# Patient Record
Sex: FEMALE | Race: WHITE | Employment: UNEMPLOYED | URBAN - METROPOLITAN AREA
[De-identification: names, ages, dates, MRNs, and addresses within clinical notes are randomized per-mention and may not be internally consistent; named-entity substitution may affect disease eponyms.]

---

## 2020-08-22 ENCOUNTER — OP HISTORICAL/CONVERTED ENCOUNTER (OUTPATIENT)
Dept: OTHER | Age: 60
End: 2020-08-22

## 2020-11-04 ENCOUNTER — APPOINTMENT (OUTPATIENT)
Dept: GENERAL RADIOLOGY | Age: 60
DRG: 683 | End: 2020-11-04
Attending: INTERNAL MEDICINE
Payer: COMMERCIAL

## 2020-11-04 ENCOUNTER — HOSPITAL ENCOUNTER (INPATIENT)
Age: 60
LOS: 4 days | Discharge: HOME | DRG: 683 | End: 2020-11-08
Attending: EMERGENCY MEDICINE | Admitting: INTERNAL MEDICINE
Payer: COMMERCIAL

## 2020-11-04 DIAGNOSIS — N30.00 ACUTE CYSTITIS WITHOUT HEMATURIA: ICD-10-CM

## 2020-11-04 DIAGNOSIS — E87.20 METABOLIC ACIDOSIS: Primary | ICD-10-CM

## 2020-11-04 DIAGNOSIS — G60.9 IDIOPATHIC PERIPHERAL NEUROPATHY: ICD-10-CM

## 2020-11-04 DIAGNOSIS — E87.6 HYPOKALEMIA: ICD-10-CM

## 2020-11-04 PROBLEM — N39.0 UTI (URINARY TRACT INFECTION): Status: ACTIVE | Noted: 2020-11-04

## 2020-11-04 LAB
ALBUMIN SERPL-MCNC: 3.1 G/DL (ref 3.5–5)
ALBUMIN/GLOB SERPL: 1 {RATIO} (ref 1.1–2.2)
ALP SERPL-CCNC: 75 U/L (ref 45–117)
ALT SERPL-CCNC: 39 U/L (ref 12–78)
ANION GAP SERPL CALC-SCNC: 10 MMOL/L (ref 5–15)
APPEARANCE UR: ABNORMAL
AST SERPL W P-5'-P-CCNC: 65 U/L (ref 15–37)
BACTERIA URNS QL MICRO: ABNORMAL /HPF
BASE DEFICIT BLDV-SCNC: 15.6 MMOL/L (ref 0–2)
BDY SITE: ABNORMAL
BILIRUB DIRECT SERPL-MCNC: <0.1 MG/DL (ref 0–0.2)
BILIRUB SERPL-MCNC: 0.5 MG/DL (ref 0.2–1)
BILIRUB UR QL: 1
BUN SERPL-MCNC: 20 MG/DL (ref 6–20)
BUN/CREAT SERPL: 16 (ref 12–20)
CA-I BLD-MCNC: 10.7 MG/DL (ref 8.5–10.1)
CHLORIDE SERPL-SCNC: 120 MMOL/L (ref 97–108)
CHLORIDE UR-SCNC: 39 MMOL/L
CO2 SERPL-SCNC: 13 MMOL/L (ref 21–32)
COLOR UR: YELLOW
CREAT SERPL-MCNC: 1.27 MG/DL (ref 0.55–1.02)
CREAT UR-MCNC: 213 MG/DL
ERYTHROCYTE [DISTWIDTH] IN BLOOD BY AUTOMATED COUNT: 17.5 % (ref 11.5–14.5)
FIO2 ON VENT: 21 %
GLOBULIN SER CALC-MCNC: 3 G/DL (ref 2–4)
GLUCOSE SERPL-MCNC: 109 MG/DL (ref 65–100)
GLUCOSE UR STRIP.AUTO-MCNC: NEGATIVE MG/DL
HCO3 BLDV-SCNC: 13 MMOL/L (ref 22–26)
HCT VFR BLD AUTO: 44 % (ref 35–47)
HGB BLD-MCNC: 16 G/DL (ref 11.5–16)
HGB UR QL STRIP: 250
HYALINE CASTS URNS QL MICRO: >20 /LPF (ref 0–5)
KETONES UR QL STRIP.AUTO: 15 MG/DL
LACTATE SERPL-SCNC: 1.5 MMOL/L (ref 0.4–2)
LEUKOCYTE ESTERASE UR QL STRIP.AUTO: 500
MAGNESIUM SERPL-MCNC: 3 MG/DL (ref 1.6–2.4)
MCH RBC QN AUTO: 32.6 PG (ref 26–34)
MCHC RBC AUTO-ENTMCNC: 36.4 G/DL (ref 30–36.5)
MCV RBC AUTO: 89.6 FL (ref 80–99)
MUCOUS THREADS URNS QL MICRO: ABNORMAL /LPF
NITRITE UR QL STRIP.AUTO: POSITIVE
NRBC # BLD: 0 K/UL (ref 0–0.01)
NRBC BLD-RTO: 0 PER 100 WBC
PCO2 BLDV: 31.5 MMHG (ref 40–50)
PH BLDV: 7.18 [PH] (ref 7.31–7.41)
PH UR STRIP: 7 [PH] (ref 5–8)
PLATELET # BLD AUTO: 394 K/UL (ref 150–400)
PMV BLD AUTO: 12.6 FL (ref 8.9–12.9)
PO2 BLDV: 59 MMHG (ref 36–42)
POTASSIUM SERPL-SCNC: 1.9 MMOL/L (ref 3.5–5.1)
PROT SERPL-MCNC: 6.1 G/DL (ref 6.4–8.2)
PROT UR STRIP-MCNC: >300 MG/DL
RBC # BLD AUTO: 4.91 M/UL (ref 3.8–5.2)
RBC #/AREA URNS HPF: ABNORMAL /HPF (ref 0–5)
SAO2 % BLDV: 90 % (ref 60–80)
SAO2% DEVICE SAO2% SENSOR NAME: ABNORMAL
SERVICE CMNT-IMP: ABNORMAL
SODIUM SERPL-SCNC: 143 MMOL/L (ref 136–145)
SODIUM UR-SCNC: 18 MMOL/L
SP GR UR REFRACTOMETRY: 1.01 (ref 1–1.03)
TROPONIN I SERPL-MCNC: 0.07 NG/ML
TROPONIN I SERPL-MCNC: 0.07 NG/ML
UA: UC IF INDICATED,UAUC: ABNORMAL
UROBILINOGEN UR QL STRIP.AUTO: 1 EU/DL (ref 0.1–1)
WBC # BLD AUTO: 13.7 K/UL (ref 3.6–11)
WBC URNS QL MICRO: >100 /HPF (ref 0–4)

## 2020-11-04 PROCEDURE — 74011000258 HC RX REV CODE- 258: Performed by: EMERGENCY MEDICINE

## 2020-11-04 PROCEDURE — 96365 THER/PROPH/DIAG IV INF INIT: CPT

## 2020-11-04 PROCEDURE — 81001 URINALYSIS AUTO W/SCOPE: CPT

## 2020-11-04 PROCEDURE — 82803 BLOOD GASES ANY COMBINATION: CPT

## 2020-11-04 PROCEDURE — 82436 ASSAY OF URINE CHLORIDE: CPT

## 2020-11-04 PROCEDURE — 82570 ASSAY OF URINE CREATININE: CPT

## 2020-11-04 PROCEDURE — 83605 ASSAY OF LACTIC ACID: CPT

## 2020-11-04 PROCEDURE — 80048 BASIC METABOLIC PNL TOTAL CA: CPT

## 2020-11-04 PROCEDURE — 87040 BLOOD CULTURE FOR BACTERIA: CPT

## 2020-11-04 PROCEDURE — 36415 COLL VENOUS BLD VENIPUNCTURE: CPT

## 2020-11-04 PROCEDURE — 80076 HEPATIC FUNCTION PANEL: CPT

## 2020-11-04 PROCEDURE — 96375 TX/PRO/DX INJ NEW DRUG ADDON: CPT

## 2020-11-04 PROCEDURE — 71045 X-RAY EXAM CHEST 1 VIEW: CPT

## 2020-11-04 PROCEDURE — 96376 TX/PRO/DX INJ SAME DRUG ADON: CPT

## 2020-11-04 PROCEDURE — 74011250637 HC RX REV CODE- 250/637: Performed by: EMERGENCY MEDICINE

## 2020-11-04 PROCEDURE — 74011250637 HC RX REV CODE- 250/637: Performed by: INTERNAL MEDICINE

## 2020-11-04 PROCEDURE — 84300 ASSAY OF URINE SODIUM: CPT

## 2020-11-04 PROCEDURE — 85027 COMPLETE CBC AUTOMATED: CPT

## 2020-11-04 PROCEDURE — 83735 ASSAY OF MAGNESIUM: CPT

## 2020-11-04 PROCEDURE — 84484 ASSAY OF TROPONIN QUANT: CPT

## 2020-11-04 PROCEDURE — 99285 EMERGENCY DEPT VISIT HI MDM: CPT

## 2020-11-04 PROCEDURE — 74011250636 HC RX REV CODE- 250/636: Performed by: INTERNAL MEDICINE

## 2020-11-04 PROCEDURE — 74011250636 HC RX REV CODE- 250/636: Performed by: EMERGENCY MEDICINE

## 2020-11-04 PROCEDURE — 65270000029 HC RM PRIVATE

## 2020-11-04 PROCEDURE — 96366 THER/PROPH/DIAG IV INF ADDON: CPT

## 2020-11-04 PROCEDURE — 93005 ELECTROCARDIOGRAM TRACING: CPT

## 2020-11-04 PROCEDURE — 74011000250 HC RX REV CODE- 250: Performed by: INTERNAL MEDICINE

## 2020-11-04 RX ORDER — MELOXICAM 15 MG/1
15 TABLET ORAL DAILY
Status: DISCONTINUED | OUTPATIENT
Start: 2020-11-05 | End: 2020-11-08 | Stop reason: HOSPADM

## 2020-11-04 RX ORDER — LANOLIN ALCOHOL/MO/W.PET/CERES
325 CREAM (GRAM) TOPICAL
Status: DISCONTINUED | OUTPATIENT
Start: 2020-11-05 | End: 2020-11-08 | Stop reason: HOSPADM

## 2020-11-04 RX ORDER — POTASSIUM CHLORIDE 7.45 MG/ML
10 INJECTION INTRAVENOUS
Status: DISCONTINUED | OUTPATIENT
Start: 2020-11-04 | End: 2020-11-04

## 2020-11-04 RX ORDER — PAROXETINE HYDROCHLORIDE 20 MG/1
40 TABLET, FILM COATED ORAL DAILY
Status: DISCONTINUED | OUTPATIENT
Start: 2020-11-05 | End: 2020-11-08 | Stop reason: HOSPADM

## 2020-11-04 RX ORDER — POTASSIUM CHLORIDE 20 MEQ/1
40 TABLET, EXTENDED RELEASE ORAL EVERY 4 HOURS
Status: DISPENSED | OUTPATIENT
Start: 2020-11-04 | End: 2020-11-04

## 2020-11-04 RX ORDER — PROMETHAZINE HYDROCHLORIDE 25 MG/1
12.5 TABLET ORAL
Status: DISCONTINUED | OUTPATIENT
Start: 2020-11-04 | End: 2020-11-08 | Stop reason: HOSPADM

## 2020-11-04 RX ORDER — FERROUS SULFATE 324(65)MG
TABLET, DELAYED RELEASE (ENTERIC COATED) ORAL
COMMUNITY
End: 2020-11-08

## 2020-11-04 RX ORDER — PANTOPRAZOLE SODIUM 20 MG/1
20 TABLET, DELAYED RELEASE ORAL DAILY
Status: DISCONTINUED | OUTPATIENT
Start: 2020-11-05 | End: 2020-11-08 | Stop reason: HOSPADM

## 2020-11-04 RX ORDER — ROPINIROLE 0.25 MG/1
TABLET, FILM COATED ORAL 3 TIMES DAILY
COMMUNITY
End: 2020-11-08

## 2020-11-04 RX ORDER — PREGABALIN 150 MG/1
150 CAPSULE ORAL 2 TIMES DAILY
Status: DISCONTINUED | OUTPATIENT
Start: 2020-11-04 | End: 2020-11-08 | Stop reason: HOSPADM

## 2020-11-04 RX ORDER — PAROXETINE HYDROCHLORIDE 40 MG/1
40 TABLET, FILM COATED ORAL DAILY
Status: ON HOLD | COMMUNITY
End: 2020-11-08 | Stop reason: SDUPTHER

## 2020-11-04 RX ORDER — ENOXAPARIN SODIUM 100 MG/ML
40 INJECTION SUBCUTANEOUS DAILY
Status: DISCONTINUED | OUTPATIENT
Start: 2020-11-05 | End: 2020-11-08 | Stop reason: HOSPADM

## 2020-11-04 RX ORDER — POTASSIUM CHLORIDE 1.5 G/1.77G
60 POWDER, FOR SOLUTION ORAL ONCE
Status: COMPLETED | OUTPATIENT
Start: 2020-11-04 | End: 2020-11-04

## 2020-11-04 RX ORDER — SODIUM CHLORIDE 9 MG/ML
100 INJECTION, SOLUTION INTRAVENOUS CONTINUOUS
Status: DISCONTINUED | OUTPATIENT
Start: 2020-11-04 | End: 2020-11-05

## 2020-11-04 RX ORDER — ONDANSETRON 2 MG/ML
4 INJECTION INTRAMUSCULAR; INTRAVENOUS
Status: DISCONTINUED | OUTPATIENT
Start: 2020-11-04 | End: 2020-11-08 | Stop reason: HOSPADM

## 2020-11-04 RX ORDER — SODIUM CHLORIDE 0.9 % (FLUSH) 0.9 %
5-40 SYRINGE (ML) INJECTION AS NEEDED
Status: DISCONTINUED | OUTPATIENT
Start: 2020-11-04 | End: 2020-11-08 | Stop reason: HOSPADM

## 2020-11-04 RX ORDER — SODIUM BICARBONATE 650 MG/1
650 TABLET ORAL
Status: DISCONTINUED | OUTPATIENT
Start: 2020-11-04 | End: 2020-11-04

## 2020-11-04 RX ORDER — PREGABALIN 150 MG/1
CAPSULE ORAL
Status: ON HOLD | COMMUNITY
End: 2020-11-08 | Stop reason: SDUPTHER

## 2020-11-04 RX ORDER — POLYETHYLENE GLYCOL 3350 17 G/17G
17 POWDER, FOR SOLUTION ORAL DAILY PRN
Status: DISCONTINUED | OUTPATIENT
Start: 2020-11-04 | End: 2020-11-08 | Stop reason: HOSPADM

## 2020-11-04 RX ORDER — ROPINIROLE 0.25 MG/1
0.25 TABLET, FILM COATED ORAL 3 TIMES DAILY
Status: DISCONTINUED | OUTPATIENT
Start: 2020-11-04 | End: 2020-11-08 | Stop reason: HOSPADM

## 2020-11-04 RX ORDER — POTASSIUM CHLORIDE 7.45 MG/ML
10 INJECTION INTRAVENOUS
Status: COMPLETED | OUTPATIENT
Start: 2020-11-04 | End: 2020-11-05

## 2020-11-04 RX ORDER — ACETAMINOPHEN 650 MG/1
650 SUPPOSITORY RECTAL
Status: DISCONTINUED | OUTPATIENT
Start: 2020-11-04 | End: 2020-11-08 | Stop reason: HOSPADM

## 2020-11-04 RX ORDER — OMEPRAZOLE 20 MG/1
20 TABLET, DELAYED RELEASE ORAL DAILY
Status: ON HOLD | COMMUNITY
End: 2020-11-08 | Stop reason: SDUPTHER

## 2020-11-04 RX ORDER — SODIUM CHLORIDE 0.9 % (FLUSH) 0.9 %
5-40 SYRINGE (ML) INJECTION EVERY 8 HOURS
Status: DISCONTINUED | OUTPATIENT
Start: 2020-11-04 | End: 2020-11-08 | Stop reason: HOSPADM

## 2020-11-04 RX ORDER — ONDANSETRON 2 MG/ML
4 INJECTION INTRAMUSCULAR; INTRAVENOUS
Status: COMPLETED | OUTPATIENT
Start: 2020-11-04 | End: 2020-11-04

## 2020-11-04 RX ORDER — ACETAMINOPHEN 325 MG/1
650 TABLET ORAL
Status: DISCONTINUED | OUTPATIENT
Start: 2020-11-04 | End: 2020-11-08 | Stop reason: HOSPADM

## 2020-11-04 RX ORDER — SODIUM BICARBONATE 1 MEQ/ML
150 SYRINGE (ML) INTRAVENOUS ONCE
Status: COMPLETED | OUTPATIENT
Start: 2020-11-04 | End: 2020-11-04

## 2020-11-04 RX ORDER — MELOXICAM 15 MG/1
15 TABLET ORAL DAILY
Status: ON HOLD | COMMUNITY
End: 2020-11-08 | Stop reason: SDUPTHER

## 2020-11-04 RX ADMIN — POTASSIUM CHLORIDE 10 MEQ: 7.46 INJECTION, SOLUTION INTRAVENOUS at 18:00

## 2020-11-04 RX ADMIN — Medication 10 ML: at 21:59

## 2020-11-04 RX ADMIN — POTASSIUM CHLORIDE 60 MEQ: 1.5 FOR SOLUTION ORAL at 12:56

## 2020-11-04 RX ADMIN — POTASSIUM CHLORIDE 10 MEQ: 7.46 INJECTION, SOLUTION INTRAVENOUS at 15:49

## 2020-11-04 RX ADMIN — POTASSIUM CHLORIDE 10 MEQ: 7.46 INJECTION, SOLUTION INTRAVENOUS at 23:50

## 2020-11-04 RX ADMIN — SODIUM CHLORIDE 1000 ML: 9 INJECTION, SOLUTION INTRAVENOUS at 15:51

## 2020-11-04 RX ADMIN — POTASSIUM CHLORIDE 10 MEQ: 7.46 INJECTION, SOLUTION INTRAVENOUS at 14:03

## 2020-11-04 RX ADMIN — Medication 10 ML: at 20:18

## 2020-11-04 RX ADMIN — PREGABALIN 150 MG: 150 CAPSULE ORAL at 20:10

## 2020-11-04 RX ADMIN — POTASSIUM CHLORIDE 10 MEQ: 7.46 INJECTION, SOLUTION INTRAVENOUS at 20:09

## 2020-11-04 RX ADMIN — POTASSIUM CHLORIDE 10 MEQ: 7.46 INJECTION, SOLUTION INTRAVENOUS at 21:58

## 2020-11-04 RX ADMIN — POTASSIUM CHLORIDE 10 MEQ: 7.46 INJECTION, SOLUTION INTRAVENOUS at 12:57

## 2020-11-04 RX ADMIN — ONDANSETRON 4 MG: 2 INJECTION INTRAMUSCULAR; INTRAVENOUS at 13:44

## 2020-11-04 RX ADMIN — ACETAMINOPHEN 650 MG: 325 TABLET, FILM COATED ORAL at 18:39

## 2020-11-04 RX ADMIN — SODIUM BICARBONATE 150 MEQ: 84 INJECTION, SOLUTION INTRAVENOUS at 13:44

## 2020-11-04 RX ADMIN — SODIUM CHLORIDE 1000 ML: 9 INJECTION, SOLUTION INTRAVENOUS at 13:03

## 2020-11-04 RX ADMIN — SODIUM CHLORIDE 100 ML/HR: 9 INJECTION, SOLUTION INTRAVENOUS at 17:59

## 2020-11-04 RX ADMIN — POTASSIUM CHLORIDE 40 MEQ: 1500 TABLET, EXTENDED RELEASE ORAL at 20:09

## 2020-11-04 RX ADMIN — CEFTRIAXONE 1 G: 1 INJECTION, POWDER, FOR SOLUTION INTRAMUSCULAR; INTRAVENOUS at 09:55

## 2020-11-04 NOTE — H&P
GENERAL GENERIC H&P/CONSULT  Presenting complaint: Generalized weakness    Subjective: 81-year-old female with past medical history of peripheral neuropathy, gastroesophageal reflux disease, iron deficiency anemia, hypokalemia, chronic pain presents to [facility] with complaints of generalized weakness. Patient states she was in her usual state of health until few days back when she started experiencing gradual onset persistent progressive generalized weakness following which patient presented to the ED. Patient denies any fevers chills vomiting lightheadedness dizziness dyspnea orthopnea paroxysmal nocturnal dyspnea chest pain palpitations headache focal weakness loss of sensation auditory or visual symptoms abdominal stool or urinary complaints or any other associated symptoms. Patient endorses no recent sick contacts or travel activity, of note patient recently moved to the area from [place]. Past Medical History:   Diagnosis Date    Chronic pain     Ectopic pregnancy     Heart murmur     Hypokalemia       Past Surgical History:   Procedure Laterality Date    HX APPENDECTOMY        Prior to Admission medications    Medication Sig Start Date End Date Taking? Authorizing Provider   ferrous sulfate 324 mg (65 mg iron) tablet Take  by mouth Daily (before breakfast). Yes Other, MD Pooja   omeprazole (PriLOSEC OTC) 20 mg tablet Take 20 mg by mouth daily. Yes Bonny, MD Pooja   meloxicam (MOBIC) 15 mg tablet Take 15 mg by mouth daily. Yes Bonny, MD Pooja   PARoxetine (PAXIL) 40 mg tablet Take 40 mg by mouth daily. Yes Bonny, MD Pooja   pregabalin (LYRICA) 150 mg capsule Take  by mouth. Yes Other, MD Pooja   rOPINIRole (REQUIP) 0.25 mg tablet Take  by mouth three (3) times daily.    Yes Bonny, MD Pooja     Allergies no known allergies   Social History     Tobacco Use    Smoking status: Current Every Day Smoker     Packs/day: 0.25    Smokeless tobacco: Never Used   Substance Use Topics    Alcohol use: Never     Frequency: Never      History reviewed. No pertinent family history. Review of Systems   Constitutional: Positive for activity change, appetite change and fatigue. Negative for chills, diaphoresis, fever and unexpected weight change. HENT: Negative for congestion, dental problem, drooling, ear discharge, ear pain, facial swelling, hearing loss, mouth sores, nosebleeds, postnasal drip, rhinorrhea, sinus pressure, sinus pain, sneezing, sore throat, tinnitus, trouble swallowing and voice change. Eyes: Negative for photophobia, pain, discharge, redness, itching and visual disturbance. Respiratory: Negative for apnea, cough, choking, chest tightness, shortness of breath, wheezing and stridor. Cardiovascular: Negative for chest pain, palpitations and leg swelling. Gastrointestinal: Positive for nausea. Negative for abdominal distention, abdominal pain, anal bleeding, blood in stool, constipation, diarrhea, rectal pain and vomiting. Endocrine: Negative for cold intolerance, heat intolerance, polydipsia, polyphagia and polyuria. Genitourinary: Negative for decreased urine volume, difficulty urinating, dyspareunia, dysuria, enuresis, flank pain, frequency, genital sores, hematuria, menstrual problem, pelvic pain, urgency, vaginal bleeding, vaginal discharge and vaginal pain. Musculoskeletal: Negative for arthralgias, back pain, gait problem, joint swelling, myalgias, neck pain and neck stiffness. Skin: Negative for color change, pallor, rash and wound. Allergic/Immunologic: Negative for environmental allergies, food allergies and immunocompromised state. Neurological: Negative for dizziness, tremors, seizures, syncope, facial asymmetry, speech difficulty, weakness, light-headedness, numbness and headaches. Hematological: Negative for adenopathy. Does not bruise/bleed easily.    Psychiatric/Behavioral: Negative for agitation, behavioral problems, confusion, decreased concentration, dysphoric mood, hallucinations, self-injury, sleep disturbance and suicidal ideas. The patient is not nervous/anxious and is not hyperactive. Objective:    No intake/output data recorded. No intake/output data recorded. Patient Vitals for the past 8 hrs:   BP Temp Pulse Resp SpO2 Height Weight   11/04/20 1115 (!) 111/92  77 17 99 %     11/04/20 1007 115/86  83 14 99 %     11/04/20 0839 95/82 97.8 °F (36.6 °C) 90 19 100 % 5' (1.524 m) 72.6 kg (160 lb)     Physical Exam  Constitutional:       General: She is not in acute distress. Appearance: Normal appearance. She is normal weight. She is ill-appearing. She is not toxic-appearing or diaphoretic. HENT:      Head: Normocephalic and atraumatic. Nose: Nose normal. No congestion or rhinorrhea. Mouth/Throat:      Mouth: Mucous membranes are moist.      Pharynx: Oropharynx is clear. Eyes:      General: No scleral icterus. Right eye: No discharge. Left eye: No discharge. Extraocular Movements: Extraocular movements intact. Conjunctiva/sclera: Conjunctivae normal.      Pupils: Pupils are equal, round, and reactive to light. Neck:      Musculoskeletal: Normal range of motion and neck supple. No neck rigidity or muscular tenderness. Vascular: No carotid bruit. Cardiovascular:      Rate and Rhythm: Normal rate and regular rhythm. Pulses: Normal pulses. Heart sounds: Normal heart sounds. No murmur. No friction rub. No gallop. Pulmonary:      Effort: Pulmonary effort is normal. No respiratory distress. Breath sounds: Normal breath sounds. No stridor. No wheezing, rhonchi or rales. Chest:      Chest wall: No tenderness. Abdominal:      General: Abdomen is flat. Bowel sounds are normal. There is no distension. Palpations: Abdomen is soft. There is no mass. Tenderness: There is no abdominal tenderness.  There is no right CVA tenderness, left CVA tenderness, guarding or rebound. Hernia: No hernia is present. Musculoskeletal: Normal range of motion. General: No swelling, tenderness, deformity or signs of injury. Right lower leg: No edema. Left lower leg: No edema. Lymphadenopathy:      Cervical: No cervical adenopathy. Skin:     General: Skin is warm. Capillary Refill: Capillary refill takes less than 2 seconds. Coloration: Skin is not jaundiced or pale. Findings: No bruising, erythema, lesion or rash. Neurological:      General: No focal deficit present. Mental Status: She is alert and oriented to person, place, and time. Mental status is at baseline. Cranial Nerves: No cranial nerve deficit. Sensory: No sensory deficit. Motor: No weakness. Coordination: Coordination normal.      Gait: Gait normal.      Deep Tendon Reflexes: Reflexes normal.   Psychiatric:         Mood and Affect: Mood normal.         Behavior: Behavior normal.         Thought Content:  Thought content normal.         Judgment: Judgment normal.          Labs:    Recent Results (from the past 24 hour(s))   CBC W/O DIFF    Collection Time: 11/04/20  8:54 AM   Result Value Ref Range    WBC 13.7 (H) 3.6 - 11.0 K/uL    RBC 4.91 3.80 - 5.20 M/uL    HGB 16.0 11.5 - 16.0 g/dL    HCT 44.0 35.0 - 47.0 %    MCV 89.6 80.0 - 99.0 FL    MCH 32.6 26.0 - 34.0 PG    MCHC 36.4 30.0 - 36.5 g/dL    RDW 17.5 (H) 11.5 - 14.5 %    PLATELET 188 004 - 040 K/uL    MPV 12.6 8.9 - 12.9 FL    NRBC 0.0 0  WBC    ABSOLUTE NRBC 0.00 0.00 - 3.94 K/uL   METABOLIC PANEL, BASIC    Collection Time: 11/04/20  8:54 AM   Result Value Ref Range    Sodium 143 136 - 145 mmol/L    Potassium 1.9 (LL) 3.5 - 5.1 mmol/L    Chloride 120 (H) 97 - 108 mmol/L    CO2 13 (LL) 21 - 32 mmol/L    Anion gap 10 5 - 15 mmol/L    Glucose 109 (H) 65 - 100 mg/dL    BUN 20 6 - 20 mg/dL    Creatinine 1.27 (H) 0.55 - 1.02 mg/dL    BUN/Creatinine ratio 16 12 - 20      GFR est AA 52 (L) >60 ml/min/1.73m2    GFR est non-AA 43 (L) >60 ml/min/1.73m2    Calcium 10.7 (H) 8.5 - 10.1 mg/dL   MAGNESIUM    Collection Time: 11/04/20  8:54 AM   Result Value Ref Range    Magnesium 3.0 (H) 1.6 - 2.4 mg/dL   TROPONIN I    Collection Time: 11/04/20  8:54 AM   Result Value Ref Range    Troponin-I, Qt. 0.07 (H) <0.05 ng/mL   URINALYSIS W/ REFLEX CULTURE    Collection Time: 11/04/20  9:00 AM    Specimen: Urine   Result Value Ref Range    Color Yellow      Appearance Turbid (A) Clear      Specific gravity 1.015 1.003 - 1.030      pH (UA) 7.0 5.0 - 8.0      Protein >300 (A) Negative mg/dL    Glucose Negative Negative mg/dL    Ketone 15 (A) Negative mg/dL    Bilirubin 1 (A) Negative      Blood 250 (A) Negative      Urobilinogen 1.0 0.1 - 1.0 EU/dL    Nitrites Positive (A) Negative      Leukocyte Esterase 500 (A) Negative      UA:UC IF INDICATED Urine Culture Ordered      WBC >100 (H) 0 - 4 /hpf    RBC 5-10 0 - 5 /hpf    Bacteria 4+ (A) Negative /hpf    Hyaline cast >20 (H) 0 - 5 /lpf    Mucus Trace /lpf   TROPONIN I    Collection Time: 11/04/20 11:15 AM   Result Value Ref Range    Troponin-I, Qt. 0.07 (H) <0.05 ng/mL       ECG: normal EKG, normal sinus rhythm   Chest X-Ray: none    Assessment:  Active Problems:    Hypokalemia (11/4/2020)      UTI (urinary tract infection) (11/4/2020)        Plan:    Urinary tract infectionpatient presents with above-mentioned symptomatology and based on patient's clinical presentation as well as physical examination in conjunction with metabolic work-up patient symptomatology is consistent with urinary tract infection, patient does not meet sepsis criteria at this time  Follow-up blood cultures  Follow-up urine cultures  Maintenance IV fluids  Ceftriaxone 1 g IV every 24  Continue to monitor    Hypokalemiaaggressively replete potassium and recheck potassium    Acute kidney injurypatient was found to have an elevated serum creatinine with unknown prior history of kidney dysfunction  IV fluids as above  Urinary electrolytes to calculate fractional excretion of sodium  Continue to trend serum creatinine    Peripheral neuropathycontinue home medications    Gastroesophageal reflux diseasecontinue home medications    ProphylaxisLovenox  FENcardiac diet, maintenance IV fluids, replete potassium and magnesium  Full code, patient surrogate decision-maker is her , admitted to telemetry for further management      Signed:   Nancy Allen MD 11/4/2020

## 2020-11-04 NOTE — ED TRIAGE NOTES
Pt stated that she just moved here and she ran out of her medications. She has a hx of low potassium. She has been feeling weak for the last 4 days.

## 2020-11-04 NOTE — ED PROVIDER NOTES
HPI   Chief Complaint   Patient presents with    Fatigue   60yoF presents with a week of fatigue. She describes it as \"lying around, not wanting to do anything, don't want to eat. \" pt denies fevers, cough, abdo pain, diarrhea, vomiting, GI bleeding, sick contacts. She is wondering if her potassium is low. Pt says she moved here from [place] recently, ran out of [employer], has no PCP. Per outside pharmacy records, pt is on iron, mobic, prilosec, paroxetine, pregabalin, ropinirole    Past Medical History:   Diagnosis Date    Chronic pain     Ectopic pregnancy     Heart murmur     Hypokalemia        Past Surgical History:   Procedure Laterality Date    HX APPENDECTOMY           History reviewed. No pertinent family history.     Social History     Socioeconomic History    Marital status: Not on file     Spouse name: Not on file    Number of children: Not on file    Years of education: Not on file    Highest education level: Not on file   Occupational History    Not on file   Social Needs    Financial resource strain: Not on file    Food insecurity     Worry: Not on file     Inability: Not on file    Transportation needs     Medical: Not on file     Non-medical: Not on file   Tobacco Use    Smoking status: Current Every Day Smoker     Packs/day: 0.25    Smokeless tobacco: Never Used   Substance and Sexual Activity    Alcohol use: Never     Frequency: Never    Drug use: Never    Sexual activity: Not on file   Lifestyle    Physical activity     Days per week: Not on file     Minutes per session: Not on file    Stress: Not on file   Relationships    Social connections     Talks on phone: Not on file     Gets together: Not on file     Attends [employer] service: Not on file     Active member of club or organization: Not on file     Attends meetings of clubs or organizations: Not on file     Relationship status: Not on file    Intimate partner violence     Fear of current or ex partner: Not on file Emotionally abused: Not on file     Physically abused: Not on file     Forced sexual activity: Not on file   Other Topics Concern    Not on file   Social History Narrative    Not on file         ALLERGIES: Patient has no known allergies. Review of Systems   Constitutional: Positive for appetite change and fatigue. All other systems reviewed and are negative. Vitals:    11/04/20 0839   BP: 95/82   Pulse: 90   Resp: 19   Temp: 97.8 °F (36.6 °C)   SpO2: 100%   Weight: 72.6 kg (160 lb)   Height: 5' (1.524 m)            Physical Exam   Patient Vitals for the past 8 hrs:   Temp Pulse Resp BP SpO2   11/04/20 0839 97.8 °F (36.6 °C) 90 19 95/82 100 %        Nursing note and vitals reviewed. Constitutional: NAD. Head: Normocephalic and atraumatic. Mouth/Throat: Airway patent. Moist mucous membranes. Eyes: EOMI. No scleral icterus. Neck: Neck supple. Cardiovascular: Normal rate, regular rhythm. 2/6 systolic murmur. Good pulses throughout. Pulmonary/Chest: No respiratory distress. Clear to auscultation bilaterally. No wheezes, rales, or rhonchi. Abdominal/GI: BS normal, Soft, non-tender, non-distended. No rebound or guarding. Musculoskeletal: No gross injuries or deformities. Neurological: Alert and oriented to person, place, and time. Cranial Nerves 2-12 intact. Moving all extremities. No gross deficits. Psych: Flat affect. No SI or HI. Skin: Skin is warm and dry. No rash noted. '    MDM   Ddx = anemia, electrolyte derangements, UTI. Lower suspicion ACS. EKG read by me at 8:48 showing NSR rate 91, no STEMI, normal axis and normal intervals. There is ST depression in inferolateral leads. UA suggests urinary tract infection. Given ceftriaxone. The lab had to run her chemistry multiple times due to concerning abnormal findings, the results were very delayed. Work-up revealed severe hypokalemia and severe metabolic acidosis.   This could be from reduced p.o. intake, severe sepsis from urinary tract infection but considering she has no fevers and is appearing no acute distress another consideration is chronic renal tubular acidosis. Giving IV potassium and bicarb repletion's. As well as IV fluid bolus. I consulted the hospitalist Dr. Yancy Kim. Admitting to him.    Labs Reviewed   CBC W/O DIFF - Abnormal; Notable for the following components:       Result Value    WBC 13.7 (*)     RDW 17.5 (*)     All other components within normal limits   METABOLIC PANEL, BASIC - Abnormal; Notable for the following components:    Potassium 1.9 (*)     Chloride 120 (*)     CO2 13 (*)     Glucose 109 (*)     Creatinine 1.27 (*)     GFR est AA 52 (*)     GFR est non-AA 43 (*)     Calcium 10.7 (*)     All other components within normal limits   URINALYSIS W/ REFLEX CULTURE - Abnormal; Notable for the following components:    Appearance Turbid (*)     Protein >300 (*)     Ketone 15 (*)     Bilirubin 1 (*)     Blood 250 (*)     Nitrites Positive (*)     Leukocyte Esterase 500 (*)     WBC >100 (*)     Bacteria 4+ (*)     Hyaline cast >20 (*)     All other components within normal limits   MAGNESIUM - Abnormal; Notable for the following components:    Magnesium 3.0 (*)     All other components within normal limits   TROPONIN I - Abnormal; Notable for the following components:    Troponin-I, Qt. 0.07 (*)     All other components within normal limits   TROPONIN I - Abnormal; Notable for the following components:    Troponin-I, Qt. 0.07 (*)     All other components within normal limits   VENOUS BLOOD GAS - Abnormal; Notable for the following components:    VENOUS PH 7.176 (*)     VENOUS PCO2 31.5 (*)     VENOUS PO2 59 (*)     VENOUS O2 SATURATION 90 (*)     VENOUS BICARBONATE 13 (*)     VENOUS BASE DEFICIT 15.6 (*)     All other components within normal limits   CULTURE, BLOOD, PAIRED   HEPATIC FUNCTION PANEL   CHLORIDE, URINE RANDOM   SODIUM, UR, RANDOM   CREATININE, UR, RANDOM   LACTIC ACID     XR CHEST PORT   Final Result   The lungs are clear. No interstitial or alveolar pulmonary edema. Cardiac  silhouette within normal limits. Small rounded retrocardiac density is a  nonspecific finding, probable small hiatal hernia. No pneumothorax or pleural  effusion. Medications   cefTRIAXone (ROCEPHIN) 1 g in 0.9% sodium chloride (MBP/ADV) 50 mL MBP (0 g IntraVENous Held 11/4/20 1316)   potassium chloride 10 mEq in 100 ml IVPB (10 mEq IntraVENous New Bag 11/4/20 1403)   potassium chloride (K-DUR, KLOR-CON) SR tablet 40 mEq (has no administration in time range)   sodium chloride 0.9 % bolus infusion 1,000 mL (has no administration in time range)   cefTRIAXone (ROCEPHIN) 1 g in 0.9% sodium chloride (MBP/ADV) 50 mL MBP (1 g IntraVENous New Bag 11/4/20 0955)   potassium chloride (KLOR-CON) packet for solution 60 mEq (60 mEq Oral Given 11/4/20 1256)   sodium chloride 0.9 % bolus infusion 1,000 mL (1,000 mL IntraVENous New Bag 11/4/20 1303)   ondansetron (ZOFRAN) injection 4 mg (4 mg IntraVENous Given 11/4/20 1344)   sodium bicarbonate 8.4 % (1 mEq/mL) injection 150 mEq (150 mEq IntraVENous Given 11/4/20 1344)     Deep DELGADO MD, am  the first and primary ED provider for this patient. Critical Care  Performed by: Josee Frias MD  Authorized by:  Josee Frias MD     Critical care provider statement:     Critical care time (minutes):  30    Critical care time was exclusive of:  Separately billable procedures and treating other patients and teaching time    Critical care was necessary to treat or prevent imminent or life-threatening deterioration of the following conditions:  Metabolic crisis (Severe hypokalemia and severe metabolic acidosis)    Critical care was time spent personally by me on the following activities:  Ordering and performing treatments and interventions, ordering and review of laboratory studies, ordering and review of radiographic studies, pulse oximetry, re-evaluation of patient's condition, obtaining history from patient or surrogate, examination of patient, evaluation of patient's response to treatment, discussions with consultants and development of treatment plan with patient or surrogate (Interpreting blood gas)

## 2020-11-04 NOTE — ROUTINE PROCESS
TRANSFER - OUT REPORT: 
 
Verbal report given to Torri Serrano on [address]  being transferred to [facility](unit) for routine progression of care Report consisted of patients Situation, Background, Assessment and  
Recommendations(SBAR). Information from the following report(s) SBAR, ED Summary, [facility] and Recent Results was reviewed with the receiving nurse. Lines:  
Peripheral IV 11/04/20 Left Forearm (Active) Site Assessment Clean, dry, & intact 11/04/20 2155 Phlebitis Assessment 0 11/04/20 0855 Infiltration Assessment 0 11/04/20 0855 Dressing Status Clean, dry, & intact 11/04/20 0855 Dressing Type Tape;Transparent 11/04/20 0855 Hub Color/Line Status Pink;Flushed;Patent 11/04/20 6189 Action Taken Blood drawn 11/04/20 0855 Peripheral IV 11/04/20 Distal;Left;Posterior Forearm (Active) Site Assessment Clean, dry, & intact 11/04/20 1446 Phlebitis Assessment 0 11/04/20 1446 Infiltration Assessment 0 11/04/20 1446 Dressing Status Clean, dry, & intact 11/04/20 1446 Opportunity for questions and clarification was provided. Patient transported with: 
 Quarterly

## 2020-11-05 LAB
ALBUMIN SERPL-MCNC: 2.6 G/DL (ref 3.5–5)
ALBUMIN/GLOB SERPL: 1 {RATIO} (ref 1.1–2.2)
ALP SERPL-CCNC: 64 U/L (ref 45–117)
ALT SERPL-CCNC: 34 U/L (ref 12–78)
ANION GAP SERPL CALC-SCNC: 8 MMOL/L (ref 5–15)
ANION GAP SERPL CALC-SCNC: 9 MMOL/L (ref 5–15)
AST SERPL W P-5'-P-CCNC: 54 U/L (ref 15–37)
ATRIAL RATE: 91 BPM
BASOPHILS # BLD: 0.1 K/UL (ref 0–0.1)
BASOPHILS NFR BLD: 0 % (ref 0–1)
BILIRUB SERPL-MCNC: 0.5 MG/DL (ref 0.2–1)
BUN SERPL-MCNC: 17 MG/DL (ref 6–20)
BUN SERPL-MCNC: 17 MG/DL (ref 6–20)
BUN/CREAT SERPL: 13 (ref 12–20)
BUN/CREAT SERPL: 17 (ref 12–20)
CA-I BLD-MCNC: 7.9 MG/DL (ref 8.5–10.1)
CA-I BLD-MCNC: 8.7 MG/DL (ref 8.5–10.1)
CALCULATED P AXIS, ECG09: 85 DEGREES
CALCULATED R AXIS, ECG10: -4 DEGREES
CALCULATED T AXIS, ECG11: 88 DEGREES
CHLORIDE SERPL-SCNC: 120 MMOL/L (ref 97–108)
CHLORIDE SERPL-SCNC: 125 MMOL/L (ref 97–108)
CO2 SERPL-SCNC: 14 MMOL/L (ref 21–32)
CO2 SERPL-SCNC: 20 MMOL/L (ref 21–32)
CREAT SERPL-MCNC: 1 MG/DL (ref 0.55–1.02)
CREAT SERPL-MCNC: 1.36 MG/DL (ref 0.55–1.02)
DIAGNOSIS, 93000: NORMAL
DIFFERENTIAL METHOD BLD: ABNORMAL
EOSINOPHIL # BLD: 0.2 K/UL (ref 0–0.4)
EOSINOPHIL NFR BLD: 2 % (ref 0–7)
ERYTHROCYTE [DISTWIDTH] IN BLOOD BY AUTOMATED COUNT: 17.6 % (ref 11.5–14.5)
GLOBULIN SER CALC-MCNC: 2.6 G/DL (ref 2–4)
GLUCOSE SERPL-MCNC: 126 MG/DL (ref 65–100)
GLUCOSE SERPL-MCNC: 87 MG/DL (ref 65–100)
HCT VFR BLD AUTO: 30.1 % (ref 35–47)
HGB BLD-MCNC: 10.4 G/DL (ref 11.5–16)
IMM GRANULOCYTES # BLD AUTO: 0 K/UL (ref 0–0.04)
IMM GRANULOCYTES NFR BLD AUTO: 0 % (ref 0–0.5)
LYMPHOCYTES # BLD: 2.5 K/UL (ref 0.8–3.5)
LYMPHOCYTES NFR BLD: 22 % (ref 12–49)
MAGNESIUM SERPL-MCNC: 2.1 MG/DL (ref 1.6–2.4)
MCH RBC QN AUTO: 30.9 PG (ref 26–34)
MCHC RBC AUTO-ENTMCNC: 34.6 G/DL (ref 30–36.5)
MCV RBC AUTO: 89.3 FL (ref 80–99)
MONOCYTES # BLD: 0.8 K/UL (ref 0–1)
MONOCYTES NFR BLD: 7 % (ref 5–13)
NEUTS SEG # BLD: 7.8 K/UL (ref 1.8–8)
NEUTS SEG NFR BLD: 69 % (ref 32–75)
P-R INTERVAL, ECG05: 154 MS
PLATELET # BLD AUTO: 290 K/UL (ref 150–400)
PMV BLD AUTO: 12.3 FL (ref 8.9–12.9)
POTASSIUM SERPL-SCNC: 1.9 MMOL/L (ref 3.5–5.1)
POTASSIUM SERPL-SCNC: 2.4 MMOL/L (ref 3.5–5.1)
PROT SERPL-MCNC: 5.2 G/DL (ref 6.4–8.2)
Q-T INTERVAL, ECG07: 376 MS
QRS DURATION, ECG06: 78 MS
QTC CALCULATION (BEZET), ECG08: 462 MS
RBC # BLD AUTO: 3.37 M/UL (ref 3.8–5.2)
SODIUM SERPL-SCNC: 148 MMOL/L (ref 136–145)
SODIUM SERPL-SCNC: 148 MMOL/L (ref 136–145)
VENTRICULAR RATE, ECG03: 91 BPM
WBC # BLD AUTO: 11.3 K/UL (ref 3.6–11)

## 2020-11-05 PROCEDURE — 74011000258 HC RX REV CODE- 258: Performed by: INTERNAL MEDICINE

## 2020-11-05 PROCEDURE — 74011250637 HC RX REV CODE- 250/637: Performed by: INTERNAL MEDICINE

## 2020-11-05 PROCEDURE — 83735 ASSAY OF MAGNESIUM: CPT

## 2020-11-05 PROCEDURE — 74011250636 HC RX REV CODE- 250/636: Performed by: INTERNAL MEDICINE

## 2020-11-05 PROCEDURE — 74011250636 HC RX REV CODE- 250/636: Performed by: NURSE PRACTITIONER

## 2020-11-05 PROCEDURE — 65270000029 HC RM PRIVATE

## 2020-11-05 PROCEDURE — 85025 COMPLETE CBC W/AUTO DIFF WBC: CPT

## 2020-11-05 PROCEDURE — 80053 COMPREHEN METABOLIC PANEL: CPT

## 2020-11-05 PROCEDURE — 74011250636 HC RX REV CODE- 250/636

## 2020-11-05 PROCEDURE — 36415 COLL VENOUS BLD VENIPUNCTURE: CPT

## 2020-11-05 PROCEDURE — 80048 BASIC METABOLIC PNL TOTAL CA: CPT

## 2020-11-05 PROCEDURE — 74011000250 HC RX REV CODE- 250: Performed by: INTERNAL MEDICINE

## 2020-11-05 RX ORDER — SODIUM BICARBONATE 1 MEQ/ML
150 SYRINGE (ML) INTRAVENOUS ONCE
Status: COMPLETED | OUTPATIENT
Start: 2020-11-05 | End: 2020-11-05

## 2020-11-05 RX ORDER — POTASSIUM CHLORIDE 20 MEQ/1
20 TABLET, EXTENDED RELEASE ORAL EVERY 6 HOURS
Status: DISCONTINUED | OUTPATIENT
Start: 2020-11-05 | End: 2020-11-08 | Stop reason: HOSPADM

## 2020-11-05 RX ORDER — POTASSIUM CHLORIDE 7.45 MG/ML
10 INJECTION INTRAVENOUS
Status: DISCONTINUED | OUTPATIENT
Start: 2020-11-05 | End: 2020-11-05

## 2020-11-05 RX ORDER — POTASSIUM CHLORIDE 7.45 MG/ML
10 INJECTION INTRAVENOUS
Status: COMPLETED | OUTPATIENT
Start: 2020-11-05 | End: 2020-11-05

## 2020-11-05 RX ORDER — POTASSIUM CHLORIDE AND SODIUM CHLORIDE 450; 150 MG/100ML; MG/100ML
INJECTION, SOLUTION INTRAVENOUS CONTINUOUS
Status: DISCONTINUED | OUTPATIENT
Start: 2020-11-06 | End: 2020-11-08 | Stop reason: HOSPADM

## 2020-11-05 RX ORDER — POTASSIUM CHLORIDE 7.45 MG/ML
INJECTION INTRAVENOUS
Status: COMPLETED
Start: 2020-11-05 | End: 2020-11-05

## 2020-11-05 RX ORDER — TRAMADOL HYDROCHLORIDE 50 MG/1
25 TABLET ORAL
Status: DISCONTINUED | OUTPATIENT
Start: 2020-11-05 | End: 2020-11-08 | Stop reason: HOSPADM

## 2020-11-05 RX ORDER — POTASSIUM CHLORIDE 20 MEQ/1
40 TABLET, EXTENDED RELEASE ORAL EVERY 4 HOURS
Status: DISCONTINUED | OUTPATIENT
Start: 2020-11-05 | End: 2020-11-05

## 2020-11-05 RX ADMIN — ROPINIROLE HYDROCHLORIDE 0.25 MG: 0.25 TABLET, FILM COATED ORAL at 21:04

## 2020-11-05 RX ADMIN — SODIUM CHLORIDE 100 ML/HR: 9 INJECTION, SOLUTION INTRAVENOUS at 09:44

## 2020-11-05 RX ADMIN — PANTOPRAZOLE SODIUM 20 MG: 20 TABLET, DELAYED RELEASE ORAL at 09:45

## 2020-11-05 RX ADMIN — Medication 10 ML: at 18:05

## 2020-11-05 RX ADMIN — ENOXAPARIN SODIUM 40 MG: 40 INJECTION SUBCUTANEOUS at 09:45

## 2020-11-05 RX ADMIN — POTASSIUM CHLORIDE 10 MEQ: 7.46 INJECTION, SOLUTION INTRAVENOUS at 09:44

## 2020-11-05 RX ADMIN — POTASSIUM CHLORIDE 10 MEQ: 7.46 INJECTION, SOLUTION INTRAVENOUS at 03:01

## 2020-11-05 RX ADMIN — Medication 10 ML: at 21:05

## 2020-11-05 RX ADMIN — PREGABALIN 150 MG: 150 CAPSULE ORAL at 21:04

## 2020-11-05 RX ADMIN — PREGABALIN 150 MG: 150 CAPSULE ORAL at 09:45

## 2020-11-05 RX ADMIN — SODIUM CHLORIDE 100 ML/HR: 9 INJECTION, SOLUTION INTRAVENOUS at 05:49

## 2020-11-05 RX ADMIN — CEFTRIAXONE 1 G: 1 INJECTION, POWDER, FOR SOLUTION INTRAMUSCULAR; INTRAVENOUS at 18:00

## 2020-11-05 RX ADMIN — POTASSIUM CHLORIDE 40 MEQ: 1500 TABLET, EXTENDED RELEASE ORAL at 09:45

## 2020-11-05 RX ADMIN — SODIUM BICARBONATE 150 MEQ: 84 INJECTION, SOLUTION INTRAVENOUS at 09:43

## 2020-11-05 RX ADMIN — SODIUM CHLORIDE: 9 INJECTION, SOLUTION INTRAVENOUS at 15:21

## 2020-11-05 RX ADMIN — TRAMADOL HYDROCHLORIDE 25 MG: 50 TABLET, FILM COATED ORAL at 15:28

## 2020-11-05 RX ADMIN — PAROXETINE HYDROCHLORIDE 40 MG: 20 TABLET, FILM COATED ORAL at 09:45

## 2020-11-05 RX ADMIN — SODIUM CHLORIDE 100 ML/HR: 9 INJECTION, SOLUTION INTRAVENOUS at 05:50

## 2020-11-05 RX ADMIN — POTASSIUM CHLORIDE 20 MEQ: 1500 TABLET, EXTENDED RELEASE ORAL at 18:00

## 2020-11-05 RX ADMIN — POTASSIUM CHLORIDE 10 MEQ: 7.46 INJECTION, SOLUTION INTRAVENOUS at 05:46

## 2020-11-05 RX ADMIN — POTASSIUM CHLORIDE 10 MEQ: 7.46 INJECTION, SOLUTION INTRAVENOUS at 01:18

## 2020-11-05 RX ADMIN — Medication 10 ML: at 05:48

## 2020-11-05 RX ADMIN — FERROUS SULFATE TAB 325 MG (65 MG ELEMENTAL FE) 325 MG: 325 (65 FE) TAB at 06:30

## 2020-11-05 RX ADMIN — POTASSIUM CHLORIDE 20 MEQ: 1500 TABLET, EXTENDED RELEASE ORAL at 21:04

## 2020-11-05 NOTE — PROGRESS NOTES
Hospitalist Progress Note    NAME: Rosana Bay   :  1960   MRN:  [id number]           Subjective:     Chief Complaint / Reason for Physician Visit  Patient seen and evaluated at bedside, patient still complaining of generalized weakness, no acute events overnight, of note patient was significantly hypokalemic this morning. Discussed with RN events overnight. Review of Systems:  Symptom Y/N Comments  Symptom Y/N Comments   Fever/Chills N   Chest Pain N    Poor Appetite Y   Edema N    Cough N   Abdominal Pain N    Sputum N   Joint Pain N    SOB/MCCANN N   Pruritis/Rash N    Nausea/vomit N   Tolerating PT/OT NA    Diarrhea N   Tolerating Diet Y    Constipation N   Other       Could NOT obtain due to:    Patient denies any fevers chills vomiting lightheadedness dizziness dyspnea orthopnea paroxysmal nocturnal dyspnea chest pain palpitations headache focal weakness loss of sensation auditory or visual symptoms abdominal stool or urinary complaints or any other associated symptoms  Objective:     VITALS:   Last 24hrs VS reviewed since prior progress note. Most recent are:  Patient Vitals for the past 24 hrs:   Temp Pulse Resp BP SpO2   20 1122 98.4 °F (36.9 °C) 79 18 (!) 96/58 99 %   20 0730 98.6 °F (37 °C) 71 18 101/61 97 %   20 0455 98.6 °F (37 °C) 74 16 (!) 97/59 98 %   20 0400  74      20 0000  69      20 2346 98 °F (36.7 °C) 69 16 (!) 90/54 96 %   20 2110 98.8 °F (37.1 °C) 67 16 (!) 92/56 97 %   20 2000  67      20 1634  64 16 120/66    20 1600  67      20 1430  72 16 121/70 98 %   20 1330  77 17 115/81 100 %       Intake/Output Summary (Last 24 hours) at 2020 1140  Last data filed at 2020 1129  Gross per 24 hour   Intake 1140 ml   Output 475 ml   Net 665 ml        PHYSICAL EXAM:  Patient is alert and oriented, appears comfortable does not appear to be in any acute distress, appears weak   EENT:  EOMI. Anicteric sclerae. MMM  Resp:  CTA bilaterally, no wheezing or rales. No accessory muscle use  CV:  Regular  rhythm, S1 plus S2, no murmurs rubs or gallops  No edema  GI:  Soft, Non distended, Non tender. +Bowel sounds  Neurologic:  Alert and oriented X 3, normal speech,   Psych:   Good insight. Not anxious nor agitated  Skin:  No rashes. No jaundice    Reviewed most current lab test results and cultures  YES  Reviewed most current radiology test results   YES  Review and summation of old records today    NO  Reviewed patient's current orders and MAR    YES  PMH/SH reviewed - no change compared to H&P    Procedures: see electronic medical records for all procedures/Xrays and details which were not copied into this note but were reviewed prior to creation of Plan. LABS:  I reviewed today's most current labs and imaging studies. Pertinent labs include:  Recent Labs     11/05/20 0555 11/04/20  0854   WBC 11.3* 13.7*   HGB 10.4* 16.0   HCT 30.1* 44.0    394     Recent Labs     11/05/20  0555 11/04/20  1437 11/04/20  0854   *  --  143   K 1.9*  --  1.9*   *  --  120*   CO2 14*  --  13*   GLU 87  --  109*   BUN 17  --  20   CREA 1.00  --  1.27*   CA 8.7  --  10.7*   MG  --   --  3.0*   ALB 2.6* 3.1*  --    TBILI 0.5 0.5  --    ALT 34 39  --        Signed:  Axel Shipley MD      Assessment / Plan:  Urinary tract infectioncurrently patient does not meet sepsis criteria at this time  Follow-up blood cultures  Follow-up urine cultures  Maintenance IV fluids  Ceftriaxone 1 g IV every 24  Continue to monitor     Hypokalemiaaggressively replete potassium and recheck potassium  Obtain nephrology consult further evaluation    Acute kidney injuryserum creatinine is currently downtrending  Continue to trend serum creatinine  Follow-up nephrology recommendations     Peripheral neuropathycontinue home medications     Gastroesophageal reflux diseasecontinue home medications     ProphylaxisLovenox  FENcardiac diet, maintenance IV fluids, replete potassium and magnesium  Full code, patient surrogate decision-maker is her ,   dispositionHome pending clinical improvement    25.0 - 29.9 Overweight / Body mass index is 31.25 kg/m². Code status: Full  Prophylaxis: Lovenox  Recommended Disposition: Home w/Family       ________________________________________________________________________  Care Plan discussed with:    Comments   Patient X    Family      RN X    Care Manager X    Consultant  X                     X Multidiciplinary team rounds were held today with , nursing, pharmacist and clinical coordinator. Patient's plan of care was discussed; medications were reviewed and discharge planning was addressed.      ________________________________________________________________________  Total NON critical care TIME: 35   Minutes        Comments   >50% of visit spent in counseling and coordination of care X    ________________________________________________________________________  Zoila Quesada MD

## 2020-11-05 NOTE — PROGRESS NOTES
Patient's current DCP is to return home self care, no current CM needs. CM continues to follow and monitor chart for DC needs.

## 2020-11-05 NOTE — CONSULTS
NAME:  Amy Quesada   :   1960   MRN:   [id number]     ATTENDING: Marshall Brooks MD  PCP:  Maddie Santoyo    Date/Time:  2020 4:06 PM      Subjective:   REQUESTING PHYSICIAN: Dr. Jessica Mata:   Hypokalemia. Met acidosis     Ms. Sy Aguilar is a 70-year-old old white female with past medical history of gastroesophageal reflux disease peripheral neuropathy chronic history of kalemia presented to the emergency room with complaints of generalized weakness. Patient has recently moved today from here and not following with any physician or nephrology this area. Initial labs showed a very low potassium 1.9 oncology consultation is requested for further evaluate management of hypokalemia. Patient seen at bedside, she is alert and awake, feeling a little better from yesterday. Repeat labs are not available for morning. Patient has history of hypokalemia she was after taking potassium supplements 3 times a day. She is not sure if she had any specific hypokalemic disorder. She denies any diuretics, no complaints of any nausea or vomiting no complaints of any chronic diarrhea. She was taking meloxicam for management of her chronic neck and joint pains. No history of chronic kidney disease. She also had metabolic acidosis with a CO2 level of 14. No complaints of any cramps. She was hypertensive with systolic blood pressures in 90s.   No complaints of any fever or chills, she remains afebrile    Past Medical History:   Diagnosis Date    Chronic obstructive pulmonary disease (HCC)     Chronic pain     neck, slipped disc    Ectopic pregnancy     GERD (gastroesophageal reflux disease)     Heart murmur     Hypokalemia     Ill-defined condition     murmur    Psychiatric disorder     depression      Past Surgical History:   Procedure Laterality Date    HX APPENDECTOMY      HX OTHER SURGICAL      needle removal left arm     Social History     Tobacco Use    Smoking status: Current Every Day Smoker     Packs/day: 0.25    Smokeless tobacco: Never Used   Substance Use Topics    Alcohol use: Never     Frequency: Never      History reviewed. No pertinent family history. No Known Allergies   Prior to Admission medications    Medication Sig Start Date End Date Taking? Authorizing Provider   ferrous sulfate 324 mg (65 mg iron) tablet Take  by mouth Daily (before breakfast). Yes Bonny, MD Pooja   omeprazole (PriLOSEC OTC) 20 mg tablet Take 20 mg by mouth daily. Yes Bonny, MD Pooja   meloxicam (MOBIC) 15 mg tablet Take 15 mg by mouth daily. Yes Bonny, MD Pooja   PARoxetine (PAXIL) 40 mg tablet Take 40 mg by mouth daily. Yes Bonny, MD Pooja   pregabalin (LYRICA) 150 mg capsule Take  by mouth. Yes Bonny, MD Pooja   rOPINIRole (REQUIP) 0.25 mg tablet Take  by mouth three (3) times daily.    Yes Bonny, MD Pooja     Current Facility-Administered Medications   Medication Dose Route Frequency    potassium chloride (K-DUR, KLOR-CON) SR tablet 20 mEq  20 mEq Oral Q6H    traMADoL (ULTRAM) tablet 25 mg  25 mg Oral Q6H PRN    ferrous sulfate tablet 325 mg  325 mg Oral ACB    meloxicam (MOBIC) tablet 15 mg  15 mg Oral DAILY    pantoprazole (PROTONIX) tablet 20 mg  20 mg Oral DAILY    PARoxetine (PAXIL) tablet 40 mg  40 mg Oral DAILY    pregabalin (LYRICA) capsule 150 mg  150 mg Oral BID    rOPINIRole (REQUIP) tablet 0.25 mg  0.25 mg Oral TID    cefTRIAXone (ROCEPHIN) 1 g in 0.9% sodium chloride (MBP/ADV) 50 mL MBP  1 g IntraVENous Q24H    sodium chloride (NS) flush 5-40 mL  5-40 mL IntraVENous Q8H    sodium chloride (NS) flush 5-40 mL  5-40 mL IntraVENous PRN    acetaminophen (TYLENOL) tablet 650 mg  650 mg Oral Q6H PRN    Or    acetaminophen (TYLENOL) suppository 650 mg  650 mg Rectal Q6H PRN    polyethylene glycol (MIRALAX) packet 17 g  17 g Oral DAILY PRN    promethazine (PHENERGAN) tablet 12.5 mg  12.5 mg Oral Q6H PRN    Or    ondansetron (ZOFRAN) injection 4 mg  4 mg IntraVENous Q6H PRN    enoxaparin (LOVENOX) injection 40 mg  40 mg SubCUTAneous DAILY       REVIEW OF SYSTEMS:     Patient has complaints of chronic joint pains and neck pains, occasional lightheadedness Present, she has complaints of peripheral neuropathy, taking Lyrica. She has intermittent  GI symptoms problems with pressure reflux disease, on omeprazole. No other significant complaints on complete review of systems  Objective:   VITALS:    Visit Vitals  BP (!) 93/50   Pulse 75   Temp 98.3 °F (36.8 °C)   Resp 18   Ht 5' (1.524 m)   Wt 72.6 kg (160 lb)   LMP  (LMP Unknown) Comment: menopause   SpO2 96%   Breastfeeding No   BMI 31.25 kg/m²     Temp (24hrs), Av.5 °F (36.9 °C), Min:98 °F (36.7 °C), Max:98.8 °F (37.1 °C)      PHYSICAL EXAM:   General: alert awake well-oriented, no acute distress. HEENT: EOMI, no Icterus, no Pallor, pupils reactive, mucosa moist, normal inspection of ears and nose, throat clear. Neck: Neck is supple, No JVD, no thyromegaly,   Lungs: breathsounds normal, no respiratory distress on inspection, no rhonchi, no rales,  CVS: heart sounds normal, regular rate and rhythm, no murmurs, no rubs. GI: soft, nontender, normal BS, no palpable organomegaly, no renal angle tenderness. Extremeties: no clubbing, no cyanosis, no edema,  Neuro: Alert, awake, oriented x3, No new focal deficits, moving all extremeties well.   Skin: normal skin turgor, no skin rashes   Musculoskeletal: no acute joint swellings    LAB DATA REVIEWED:    Recent Results (from the past 24 hour(s))   METABOLIC PANEL, COMPREHENSIVE    Collection Time: 20  5:55 AM   Result Value Ref Range    Sodium 148 (H) 136 - 145 mmol/L    Potassium 1.9 (LL) 3.5 - 5.1 mmol/L    Chloride 125 (H) 97 - 108 mmol/L    CO2 14 (LL) 21 - 32 mmol/L    Anion gap 9 5 - 15 mmol/L    Glucose 87 65 - 100 mg/dL    BUN 17 6 - 20 mg/dL    Creatinine 1.00 0.55 - 1.02 mg/dL    BUN/Creatinine ratio 17 12 - 20      GFR est AA >60 >60 ml/min/1.73m2    GFR est non-AA 57 (L) >60 ml/min/1.73m2    Calcium 8.7 8.5 - 10.1 mg/dL    Bilirubin, total 0.5 0.2 - 1.0 mg/dL    AST (SGOT) 54 (H) 15 - 37 U/L    ALT (SGPT) 34 12 - 78 U/L    Alk. phosphatase 64 45 - 117 U/L    Protein, total 5.2 (L) 6.4 - 8.2 g/dL    Albumin 2.6 (L) 3.5 - 5.0 g/dL    Globulin 2.6 2.0 - 4.0 g/dL    A-G Ratio 1.0 (L) 1.1 - 2.2     CBC WITH AUTOMATED DIFF    Collection Time: 11/05/20  5:55 AM   Result Value Ref Range    WBC 11.3 (H) 3.6 - 11.0 K/uL    RBC 3.37 (L) 3.80 - 5.20 M/uL    HGB 10.4 (L) 11.5 - 16.0 g/dL    HCT 30.1 (L) 35.0 - 47.0 %    MCV 89.3 80.0 - 99.0 FL    MCH 30.9 26.0 - 34.0 PG    MCHC 34.6 30.0 - 36.5 g/dL    RDW 17.6 (H) 11.5 - 14.5 %    PLATELET 751 099 - 745 K/uL    MPV 12.3 8.9 - 12.9 FL    NEUTROPHILS 69 32 - 75 %    LYMPHOCYTES 22 12 - 49 %    MONOCYTES 7 5 - 13 %    EOSINOPHILS 2 0 - 7 %    BASOPHILS 0 0 - 1 %    IMMATURE GRANULOCYTES 0 0.0 - 0.5 %    ABS. NEUTROPHILS 7.8 1.8 - 8.0 K/UL    ABS. LYMPHOCYTES 2.5 0.8 - 3.5 K/UL    ABS. MONOCYTES 0.8 0.0 - 1.0 K/UL    ABS. EOSINOPHILS 0.2 0.0 - 0.4 K/UL    ABS. BASOPHILS 0.1 0.0 - 0.1 K/UL    ABS. IMM. GRANS. 0.0 0.00 - 0.04 K/UL    DF AUTOMATED         Assessment plan   1 severe hypokalemia: Symptomatic with muscle weakness  Hypokalemia appears to be chronic in nature  Existence of hypokalemia along with unexplained metabolic acidosis suspicious for type I or type II renal tubular acidosis  Other possibilities include hyperaldosteronism/Bartter's/Gitelman but unlikely as they are commonly present with metabolic alkalosis    Recommend to check urine random electrolytes and urine potassium excretion, check plasma renin, aldosterone, cortisol levels. Recommend work-up to check suspected etiology of type I RTA like connective tissue  Disorders/renal calculi/calciuria/hyperparathyroidism. Type I RTA can also develop with use chronic use of NSAIDs, will advise patient to minimize the use of NSAIDs and try more of tramadol.     Recommend to give 50 mEq of IV potassium and 500 mL over 5 hours and repeat the potassium levels, will continue to repeat the doses as needed until potassium level is 3.2. Plan discussed with the nursing staff  Will also continue potassium chloride 20 mEq every 6 hours    2. Metabolic acidosis; nongap acidosis   no definite etiology of GI symptoms or chronic kidney disease, no history of any pulmonary disease. Suspect it is related to the renal tubular acidosis  Patient has been hypotensive, recommend to check a lactic acid level to rule out lactic acidosis  Patient received IV sodium bicarbonate today we will continue to monitor CO2 levels and make recommendations for long-term management    3. Hypotension: Patient is not on any antihypertensive medications. Continue IV fluids, check TSH and random cortisol levels to rule out any endocrine causes. 4.  Generalized weakness: Probably related to severe hypokalemia and metabolic acidosis. Will continue monitor symptoms with correction of potassium    4. Osteoarthritis/neck pains: Appears to be chronic  Continue home medications.     Thank you for the consultation    signed: Elda Garrido MD

## 2020-11-06 ENCOUNTER — APPOINTMENT (OUTPATIENT)
Dept: ULTRASOUND IMAGING | Age: 60
DRG: 683 | End: 2020-11-06
Attending: INTERNAL MEDICINE
Payer: COMMERCIAL

## 2020-11-06 LAB
ALBUMIN SERPL-MCNC: 2.5 G/DL (ref 3.5–5)
ANION GAP SERPL CALC-SCNC: 4 MMOL/L (ref 5–15)
BUN SERPL-MCNC: 14 MG/DL (ref 6–20)
BUN/CREAT SERPL: 14 (ref 12–20)
CA-I BLD-MCNC: 8.3 MG/DL (ref 8.5–10.1)
CHLORIDE SERPL-SCNC: 120 MMOL/L (ref 97–108)
CO2 SERPL-SCNC: 21 MMOL/L (ref 21–32)
CREAT SERPL-MCNC: 0.97 MG/DL (ref 0.55–1.02)
ERYTHROCYTE [DISTWIDTH] IN BLOOD BY AUTOMATED COUNT: 18 % (ref 11.5–14.5)
GLUCOSE SERPL-MCNC: 79 MG/DL (ref 65–100)
HCT VFR BLD AUTO: 29.6 % (ref 35–47)
HGB BLD-MCNC: 10.4 G/DL (ref 11.5–16)
MAGNESIUM SERPL-MCNC: 2.1 MG/DL (ref 1.6–2.4)
MCH RBC QN AUTO: 31.4 PG (ref 26–34)
MCHC RBC AUTO-ENTMCNC: 35.1 G/DL (ref 30–36.5)
MCV RBC AUTO: 89.4 FL (ref 80–99)
PHOSPHATE SERPL-MCNC: 0.9 MG/DL (ref 2.6–4.7)
PLATELET # BLD AUTO: 262 K/UL (ref 150–400)
PMV BLD AUTO: 13.3 FL (ref 8.9–12.9)
POTASSIUM SERPL-SCNC: 2.8 MMOL/L (ref 3.5–5.1)
RBC # BLD AUTO: 3.31 M/UL (ref 3.8–5.2)
SODIUM SERPL-SCNC: 145 MMOL/L (ref 136–145)
WBC # BLD AUTO: 13 K/UL (ref 3.6–11)

## 2020-11-06 PROCEDURE — 80069 RENAL FUNCTION PANEL: CPT

## 2020-11-06 PROCEDURE — 36415 COLL VENOUS BLD VENIPUNCTURE: CPT

## 2020-11-06 PROCEDURE — 82306 VITAMIN D 25 HYDROXY: CPT

## 2020-11-06 PROCEDURE — 74011250636 HC RX REV CODE- 250/636: Performed by: INTERNAL MEDICINE

## 2020-11-06 PROCEDURE — 85027 COMPLETE CBC AUTOMATED: CPT

## 2020-11-06 PROCEDURE — 83970 ASSAY OF PARATHORMONE: CPT

## 2020-11-06 PROCEDURE — 74011250637 HC RX REV CODE- 250/637: Performed by: INTERNAL MEDICINE

## 2020-11-06 PROCEDURE — 83735 ASSAY OF MAGNESIUM: CPT

## 2020-11-06 PROCEDURE — 65270000029 HC RM PRIVATE

## 2020-11-06 PROCEDURE — 86431 RHEUMATOID FACTOR QUANT: CPT

## 2020-11-06 PROCEDURE — 76770 US EXAM ABDO BACK WALL COMP: CPT

## 2020-11-06 PROCEDURE — 86160 COMPLEMENT ANTIGEN: CPT

## 2020-11-06 PROCEDURE — 74011000250 HC RX REV CODE- 250: Performed by: INTERNAL MEDICINE

## 2020-11-06 PROCEDURE — 74011000258 HC RX REV CODE- 258: Performed by: INTERNAL MEDICINE

## 2020-11-06 RX ADMIN — POTASSIUM CHLORIDE AND SODIUM CHLORIDE: 450; 150 INJECTION, SOLUTION INTRAVENOUS at 10:25

## 2020-11-06 RX ADMIN — PAROXETINE HYDROCHLORIDE 40 MG: 20 TABLET, FILM COATED ORAL at 08:25

## 2020-11-06 RX ADMIN — POTASSIUM CHLORIDE 20 MEQ: 1500 TABLET, EXTENDED RELEASE ORAL at 21:07

## 2020-11-06 RX ADMIN — PANTOPRAZOLE SODIUM 20 MG: 20 TABLET, DELAYED RELEASE ORAL at 08:25

## 2020-11-06 RX ADMIN — TRAMADOL HYDROCHLORIDE 25 MG: 50 TABLET, FILM COATED ORAL at 00:25

## 2020-11-06 RX ADMIN — Medication 10 ML: at 14:06

## 2020-11-06 RX ADMIN — POTASSIUM CHLORIDE 20 MEQ: 1500 TABLET, EXTENDED RELEASE ORAL at 10:25

## 2020-11-06 RX ADMIN — FERROUS SULFATE TAB 325 MG (65 MG ELEMENTAL FE) 325 MG: 325 (65 FE) TAB at 08:25

## 2020-11-06 RX ADMIN — ACETAMINOPHEN 650 MG: 325 TABLET, FILM COATED ORAL at 08:32

## 2020-11-06 RX ADMIN — ROPINIROLE HYDROCHLORIDE 0.25 MG: 0.25 TABLET, FILM COATED ORAL at 11:11

## 2020-11-06 RX ADMIN — ROPINIROLE HYDROCHLORIDE 0.25 MG: 0.25 TABLET, FILM COATED ORAL at 21:06

## 2020-11-06 RX ADMIN — MELOXICAM 15 MG: 15 TABLET ORAL at 10:25

## 2020-11-06 RX ADMIN — SODIUM CHLORIDE: 9 INJECTION, SOLUTION INTRAVENOUS at 00:19

## 2020-11-06 RX ADMIN — ENOXAPARIN SODIUM 40 MG: 40 INJECTION SUBCUTANEOUS at 08:25

## 2020-11-06 RX ADMIN — Medication 10 ML: at 22:00

## 2020-11-06 RX ADMIN — POTASSIUM CHLORIDE 20 MEQ: 1500 TABLET, EXTENDED RELEASE ORAL at 06:19

## 2020-11-06 RX ADMIN — POTASSIUM PHOSPHATE, MONOBASIC POTASSIUM PHOSPHATE, DIBASIC: 224; 236 INJECTION, SOLUTION, CONCENTRATE INTRAVENOUS at 12:23

## 2020-11-06 RX ADMIN — TRAMADOL HYDROCHLORIDE 25 MG: 50 TABLET, FILM COATED ORAL at 21:06

## 2020-11-06 RX ADMIN — CEFTRIAXONE 1 G: 1 INJECTION, POWDER, FOR SOLUTION INTRAMUSCULAR; INTRAVENOUS at 14:03

## 2020-11-06 RX ADMIN — ROPINIROLE HYDROCHLORIDE 0.25 MG: 0.25 TABLET, FILM COATED ORAL at 16:21

## 2020-11-06 RX ADMIN — PREGABALIN 150 MG: 150 CAPSULE ORAL at 21:07

## 2020-11-06 RX ADMIN — PREGABALIN 150 MG: 150 CAPSULE ORAL at 08:25

## 2020-11-06 RX ADMIN — POTASSIUM CHLORIDE 20 MEQ: 1500 TABLET, EXTENDED RELEASE ORAL at 16:21

## 2020-11-06 RX ADMIN — POTASSIUM CHLORIDE AND SODIUM CHLORIDE: 450; 150 INJECTION, SOLUTION INTRAVENOUS at 23:59

## 2020-11-06 RX ADMIN — TRAMADOL HYDROCHLORIDE 25 MG: 50 TABLET, FILM COATED ORAL at 06:19

## 2020-11-06 RX ADMIN — Medication 10 ML: at 12:27

## 2020-11-06 NOTE — PROGRESS NOTES
Renal Progress Note    Patient: Izzy Trevizo MRN: [id number]  SSN: [id number]    YOB: 1960  Age: 64 y.o. Sex: female      Admit Date: 11/4/2020    LOS: 2 days     Subjective:   Patient seen at bedside. Alert and awake, no acute distress. Patient is feeling better, not giving any new complaints today. No complaints of any swelling in lower extremities.    No complaints of cramps  Weakness has improved  K is 2.8 this morning after >150 meq IV and >100 meq po supplements      Current Facility-Administered Medications   Medication Dose Route Frequency    potassium phosphate 30 mmol in 0.9% sodium chloride 250 mL infusion   IntraVENous ONCE    potassium chloride (K-DUR, KLOR-CON) SR tablet 20 mEq  20 mEq Oral Q6H    traMADoL (ULTRAM) tablet 25 mg  25 mg Oral Q6H PRN    0.45% sodium chloride with KCl 20 mEq/L infusion   IntraVENous CONTINUOUS    ferrous sulfate tablet 325 mg  325 mg Oral ACB    meloxicam (MOBIC) tablet 15 mg  15 mg Oral DAILY    pantoprazole (PROTONIX) tablet 20 mg  20 mg Oral DAILY    PARoxetine (PAXIL) tablet 40 mg  40 mg Oral DAILY    pregabalin (LYRICA) capsule 150 mg  150 mg Oral BID    rOPINIRole (REQUIP) tablet 0.25 mg  0.25 mg Oral TID    cefTRIAXone (ROCEPHIN) 1 g in 0.9% sodium chloride (MBP/ADV) 50 mL MBP  1 g IntraVENous Q24H    sodium chloride (NS) flush 5-40 mL  5-40 mL IntraVENous Q8H    sodium chloride (NS) flush 5-40 mL  5-40 mL IntraVENous PRN    acetaminophen (TYLENOL) tablet 650 mg  650 mg Oral Q6H PRN    Or    acetaminophen (TYLENOL) suppository 650 mg  650 mg Rectal Q6H PRN    polyethylene glycol (MIRALAX) packet 17 g  17 g Oral DAILY PRN    promethazine (PHENERGAN) tablet 12.5 mg  12.5 mg Oral Q6H PRN    Or    ondansetron (ZOFRAN) injection 4 mg  4 mg IntraVENous Q6H PRN    enoxaparin (LOVENOX) injection 40 mg  40 mg SubCUTAneous DAILY        Vitals:    11/06/20 0800 11/06/20 1200 11/06/20 1206 11/06/20 1516   BP:   (!) 95/58 (!) 95/53 Pulse: 80 84 84 75   Resp:   20 20   Temp:   97.8 °F (36.6 °C) 97.9 °F (36.6 °C)   SpO2:   99% 99%   Weight:       Height:         Objective:   General: alert awake well-oriented, no acute distress. HEENT: EOMI, no Icterus, no Pallor,  mucosa moist.  Neck: Neck is supple, No JVD  Lungs: breathsounds normal, no respiratory distress on inspection, no rhonchi, no rales,   CVS: heart sounds normal,  no murmurs, no rubs. GI: soft, nontender, normal BS. Extremeties: no cyanosis, no edema,   Neuro: Alert, awake, oriented x3, No new focal deficits, moving all extremeties well. Skin: normal skin turgor, no skin rashes. Intake and Output:  Current Shift: No intake/output data recorded.   Last three shifts: 11/04 1901 - 11/06 0700  In: 1240 [P.O.:1240]  Out: 1210 [Urine:1725]      Lab/Data Review:  Recent Labs     11/06/20  0700 11/05/20  0555 11/04/20  0854   WBC 13.0* 11.3* 13.7*   HGB 10.4* 10.4* 16.0   HCT 29.6* 30.1* 44.0    290 394     Recent Labs     11/06/20  0700 11/05/20  2142 11/05/20  0555 11/04/20  1437 11/04/20  0854    148* 148*  --  143   K 2.8* 2.4* 1.9*  --  1.9*   * 120* 125*  --  120*   CO2 21 20* 14*  --  13*   GLU 79 126* 87  --  109*   BUN 14 17 17  --  20   CREA 0.97 1.36* 1.00  --  1.27*   CA 8.3* 7.9* 8.7  --  10.7*   MG 2.1 2.1  --   --  3.0*   PHOS 0.9*  --   --   --   --    ALB 2.5*  --  2.6* 3.1*  --    TBILI  --   --  0.5 0.5  --    ALT  --   --  34 39  --      Recent Labs     11/04/20  1300   FIO2 21.0     Recent Results (from the past 24 hour(s))   METABOLIC PANEL, BASIC    Collection Time: 11/05/20  9:42 PM   Result Value Ref Range    Sodium 148 (H) 136 - 145 mmol/L    Potassium 2.4 (LL) 3.5 - 5.1 mmol/L    Chloride 120 (H) 97 - 108 mmol/L    CO2 20 (L) 21 - 32 mmol/L    Anion gap 8 5 - 15 mmol/L    Glucose 126 (H) 65 - 100 mg/dL    BUN 17 6 - 20 mg/dL    Creatinine 1.36 (H) 0.55 - 1.02 mg/dL    BUN/Creatinine ratio 13 12 - 20      GFR est AA 48 (L) >60 ml/min/1.73m2    GFR est non-AA 40 (L) >60 ml/min/1.73m2    Calcium 7.9 (L) 8.5 - 10.1 mg/dL   MAGNESIUM    Collection Time: 11/05/20  9:42 PM   Result Value Ref Range    Magnesium 2.1 1.6 - 2.4 mg/dL   CBC W/O DIFF    Collection Time: 11/06/20  7:00 AM   Result Value Ref Range    WBC 13.0 (H) 3.6 - 11.0 K/uL    RBC 3.31 (L) 3.80 - 5.20 M/uL    HGB 10.4 (L) 11.5 - 16.0 g/dL    HCT 29.6 (L) 35.0 - 47.0 %    MCV 89.4 80.0 - 99.0 FL    MCH 31.4 26.0 - 34.0 PG    MCHC 35.1 30.0 - 36.5 g/dL    RDW 18.0 (H) 11.5 - 14.5 %    PLATELET 943 202 - 903 K/uL    MPV 13.3 (H) 8.9 - 12.9 FL   MAGNESIUM    Collection Time: 11/06/20  7:00 AM   Result Value Ref Range    Magnesium 2.1 1.6 - 2.4 mg/dL   RENAL FUNCTION PANEL    Collection Time: 11/06/20  7:00 AM   Result Value Ref Range    Sodium 145 136 - 145 mmol/L    Potassium 2.8 (L) 3.5 - 5.1 mmol/L    Chloride 120 (H) 97 - 108 mmol/L    CO2 21 21 - 32 mmol/L    Anion gap 4 (L) 5 - 15 mmol/L    Glucose 79 65 - 100 mg/dL    BUN 14 6 - 20 mg/dL    Creatinine 0.97 0.55 - 1.02 mg/dL    BUN/Creatinine ratio 14 12 - 20      GFR est AA >60 >60 ml/min/1.73m2    GFR est non-AA 59 (L) >60 ml/min/1.73m2    Calcium 8.3 (L) 8.5 - 10.1 mg/dL    Phosphorus 0.9 (LL) 2.6 - 4.7 mg/dL    Albumin 2.5 (L) 3.5 - 5.0 g/dL        Assessment and Plan:     1 severe hypokalemia: Symptomatic with muscle weakness  Hypokalemia appears to be chronic in nature  Existence of hypokalemia along with unexplained metabolic acidosis suspicious for type I  renal tubular acidosis  Other possibilities include hyperaldosteronism/Bartter's/Gitelman but unlikely as they are commonly present with metabolic alkalosis     Repeat urine random K and creatinine   Check plasma renin, aldosterone, cortisol levels   Recommend work-up to check suspected etiology of type I RTA like connective tissue  Disorders/renal calculi/calciuria/hyperparathyroidism.     Type I RTA can also develop with use chronic use of NSAIDs, will advise patient to minimize the use of NSAIDs and try more of tramadol.     Will continue to give 50 mEq of IV potassium and 500 mL over 5 hours and repeat the potassium levels, will continue to repeat the doses as needed until potassium level is 3.2. Will also continue potassium chloride 20 mEq every 6 hours     2. Metabolic acidosis; nongap acidosis   no definite etiology of GI symptoms or chronic kidney disease, no history of any pulmonary disease. Suspect it is related to the renal tubular acidosis  Improved with supplementation  Will continue to monitor     3. Hypotension: Patient is not on any antihypertensive medications. Continue IV fluids, check TSH and random cortisol levels to rule out any endocrine causes.     4.  Generalized weakness: Probably related to severe hypokalemia and metabolic acidosis. improved clinically   Will continue monitor symptoms with correction of potassium     4.  Osteoarthritis/neck pains: Appears to be chronic  Continue home medications.         Signed By: Cain Johnson MD     November 6, 2020

## 2020-11-06 NOTE — PROGRESS NOTES
Problem: Falls - Risk of  Goal: *Absence of Falls  Description: Document Wilfred Maura Fall Risk and appropriate interventions in the flowsheet. Outcome: Progressing Towards Goal  Note: Fall Risk Interventions:            Medication Interventions: Bed/chair exit alarm    Elimination Interventions: Call light in reach    Patient feeling stronger able to produce purposeful movement to right arm and leg. Indicated desire to try to get up to chair today .

## 2020-11-06 NOTE — PROGRESS NOTES
Hospitalist Progress Note    NAME: Simona Shi   :  1960   MRN:  [id number]           Subjective:     Chief Complaint / Reason for Physician Visit  Patient seen and evaluated at bedside, patient still complaining of generalized weakness, slight improvement from overnight, of note patient was significantly hypokalemic this morning. Discussed with RN events overnight. Review of Systems:  Symptom Y/N Comments  Symptom Y/N Comments   Fever/Chills N   Chest Pain N    Poor Appetite Y   Edema N    Cough N   Abdominal Pain N    Sputum N   Joint Pain N    SOB/MCCANN N   Pruritis/Rash N    Nausea/vomit N   Tolerating PT/OT NA    Diarrhea N   Tolerating Diet Y    Constipation N   Other       Could NOT obtain due to:    Patient denies any fevers chills vomiting lightheadedness dizziness dyspnea orthopnea paroxysmal nocturnal dyspnea chest pain palpitations headache focal weakness loss of sensation auditory or visual symptoms abdominal stool or urinary complaints or any other associated symptoms  Objective:     VITALS:   Last 24hrs VS reviewed since prior progress note. Most recent are:  Patient Vitals for the past 24 hrs:   Temp Pulse Resp BP SpO2   20 0800  80      20 0741 97.7 °F (36.5 °C) 84 20 (!) 97/58 99 %   20 0408 97.8 °F (36.6 °C) (!) 105 18 102/64 95 %   20 0005 97.7 °F (36.5 °C) 76 20 (!) 88/55 95 %   20 1907 98.2 °F (36.8 °C) 65 20 (!) 89/62 95 %   20 1545 98.3 °F (36.8 °C)       20 1539  75 18 (!) 93/50 96 %   20 1515 98.4 °F (36.9 °C) 77 18 (!) 93/54 97 %   20 1200  75          Intake/Output Summary (Last 24 hours) at 2020 1141  Last data filed at 2020 0412  Gross per 24 hour   Intake 100 ml   Output 1250 ml   Net -1150 ml        PHYSICAL EXAM:  Patient is alert and oriented, appears comfortable does not appear to be in any acute distress, appears weak   EENT:  EOMI. Anicteric sclerae.  MMM  Resp:  CTA bilaterally, no wheezing or rales. No accessory muscle use  CV:  Regular  rhythm, S1 plus S2, no murmurs rubs or gallops  No edema  GI:  Soft, Non distended, Non tender. +Bowel sounds  Neurologic:  Alert and oriented X 3, normal speech,   Psych:   Good insight. Not anxious nor agitated  Skin:  No rashes. No jaundice    Reviewed most current lab test results and cultures  YES  Reviewed most current radiology test results   YES  Review and summation of old records today    NO  Reviewed patient's current orders and MAR    YES  PMH/SH reviewed - no change compared to H&P    Procedures: see electronic medical records for all procedures/Xrays and details which were not copied into this note but were reviewed prior to creation of Plan. LABS:  I reviewed today's most current labs and imaging studies. Pertinent labs include:  Recent Labs     11/05/20 0555 11/04/20  0854   WBC 11.3* 13.7*   HGB 10.4* 16.0   HCT 30.1* 44.0    394     Recent Labs     11/05/20  0555 11/04/20  1437 11/04/20  0854   *  --  143   K 1.9*  --  1.9*   *  --  120*   CO2 14*  --  13*   GLU 87  --  109*   BUN 17  --  20   CREA 1.00  --  1.27*   CA 8.7  --  10.7*   MG  --   --  3.0*   ALB 2.6* 3.1*  --    TBILI 0.5 0.5  --    ALT 34 39  --        Signed:  Berta Leger MD      Assessment / Plan:  Urinary tract infectioncurrently patient does not meet sepsis criteria at this time  Follow-up blood cultures  Follow-up urine cultures  Maintenance IV fluids  Ceftriaxone 1 g IV every 24  Continue to monitor     Hypokalemiaaggressively replete potassium and recheck potassium  Nephrology consult appreciated will continue to follow    Hypophosphatemia - replete phosphorus and recheck phosphorus    Acute kidney injurycurrently resolved  Follow-up nephrology recommendations     Peripheral neuropathycontinue home medications     Gastroesophageal reflux diseasecontinue home medications     ProphylaxisLovenox  FENcardiac diet, maintenance IV fluids, replete potassium and magnesium  Full code, patient surrogate decision-maker is her ,   dispositionHome pending clinical improvement    25.0 - 29.9 Overweight / Body mass index is 31.25 kg/m². Code status: Full  Prophylaxis: Lovenox  Recommended Disposition: Home w/Family       ________________________________________________________________________  Care Plan discussed with:    Comments   Patient X    Family      RN X    Care Manager X    Consultant  X                     X Multidiciplinary team rounds were held today with , nursing, pharmacist and clinical coordinator. Patient's plan of care was discussed; medications were reviewed and discharge planning was addressed.      ________________________________________________________________________  Total NON critical care TIME: 35   Minutes        Comments   >50% of visit spent in counseling and coordination of care X    ________________________________________________________________________  Suhas Davis MD

## 2020-11-07 LAB
25(OH)D3 SERPL-MCNC: 37.5 NG/ML (ref 30–100)
ANION GAP SERPL CALC-SCNC: 6 MMOL/L (ref 5–15)
BUN SERPL-MCNC: 11 MG/DL (ref 6–20)
BUN/CREAT SERPL: 12 (ref 12–20)
CA-I BLD-MCNC: 8.3 MG/DL (ref 8.5–10.1)
CA-I BLD-MCNC: 8.5 MG/DL (ref 8.5–10.1)
CHLORIDE SERPL-SCNC: 121 MMOL/L (ref 97–108)
CO2 SERPL-SCNC: 18 MMOL/L (ref 21–32)
CORTIS AM PEAK SERPL-MCNC: 5.4 UG/DL (ref 4.3–22.45)
CREAT SERPL-MCNC: 0.9 MG/DL (ref 0.55–1.02)
ERYTHROCYTE [DISTWIDTH] IN BLOOD BY AUTOMATED COUNT: 18.9 % (ref 11.5–14.5)
GLUCOSE SERPL-MCNC: 91 MG/DL (ref 65–100)
HCT VFR BLD AUTO: 30.6 % (ref 35–47)
HGB BLD-MCNC: 10.4 G/DL (ref 11.5–16)
MCH RBC QN AUTO: 31.3 PG (ref 26–34)
MCHC RBC AUTO-ENTMCNC: 34 G/DL (ref 30–36.5)
MCV RBC AUTO: 92.2 FL (ref 80–99)
PHOSPHATE SERPL-MCNC: 2.2 MG/DL (ref 2.6–4.7)
PLATELET # BLD AUTO: 226 K/UL (ref 150–400)
PMV BLD AUTO: 12.4 FL (ref 8.9–12.9)
POTASSIUM SERPL-SCNC: 3.3 MMOL/L (ref 3.5–5.1)
PTH-INTACT SERPL-MCNC: 22.2 PG/ML (ref 18.4–88)
RBC # BLD AUTO: 3.32 M/UL (ref 3.8–5.2)
SODIUM SERPL-SCNC: 145 MMOL/L (ref 136–145)
TSH SERPL DL<=0.05 MIU/L-ACNC: 4.05 UIU/ML (ref 0.36–3.74)
WBC # BLD AUTO: 11.2 K/UL (ref 3.6–11)

## 2020-11-07 PROCEDURE — 82533 TOTAL CORTISOL: CPT

## 2020-11-07 PROCEDURE — 36415 COLL VENOUS BLD VENIPUNCTURE: CPT

## 2020-11-07 PROCEDURE — 74011250637 HC RX REV CODE- 250/637: Performed by: INTERNAL MEDICINE

## 2020-11-07 PROCEDURE — 74011000258 HC RX REV CODE- 258: Performed by: INTERNAL MEDICINE

## 2020-11-07 PROCEDURE — 74011250636 HC RX REV CODE- 250/636: Performed by: INTERNAL MEDICINE

## 2020-11-07 PROCEDURE — 84100 ASSAY OF PHOSPHORUS: CPT

## 2020-11-07 PROCEDURE — 74011000250 HC RX REV CODE- 250: Performed by: INTERNAL MEDICINE

## 2020-11-07 PROCEDURE — 84443 ASSAY THYROID STIM HORMONE: CPT

## 2020-11-07 PROCEDURE — 85027 COMPLETE CBC AUTOMATED: CPT

## 2020-11-07 PROCEDURE — 65270000029 HC RM PRIVATE

## 2020-11-07 PROCEDURE — 82088 ASSAY OF ALDOSTERONE: CPT

## 2020-11-07 PROCEDURE — 80048 BASIC METABOLIC PNL TOTAL CA: CPT

## 2020-11-07 RX ORDER — ALBUTEROL SULFATE 90 UG/1
2 AEROSOL, METERED RESPIRATORY (INHALATION)
Status: DISCONTINUED | OUTPATIENT
Start: 2020-11-07 | End: 2020-11-08 | Stop reason: HOSPADM

## 2020-11-07 RX ORDER — POTASSIUM CHLORIDE 20 MEQ/1
40 TABLET, EXTENDED RELEASE ORAL
Status: COMPLETED | OUTPATIENT
Start: 2020-11-07 | End: 2020-11-07

## 2020-11-07 RX ADMIN — POTASSIUM CHLORIDE 20 MEQ: 1500 TABLET, EXTENDED RELEASE ORAL at 17:35

## 2020-11-07 RX ADMIN — CEFTRIAXONE 1 G: 1 INJECTION, POWDER, FOR SOLUTION INTRAMUSCULAR; INTRAVENOUS at 12:58

## 2020-11-07 RX ADMIN — Medication 10 ML: at 08:33

## 2020-11-07 RX ADMIN — POTASSIUM CHLORIDE 40 MEQ: 1500 TABLET, EXTENDED RELEASE ORAL at 10:11

## 2020-11-07 RX ADMIN — PAROXETINE HYDROCHLORIDE 40 MG: 20 TABLET, FILM COATED ORAL at 08:29

## 2020-11-07 RX ADMIN — ROPINIROLE HYDROCHLORIDE 0.25 MG: 0.25 TABLET, FILM COATED ORAL at 17:34

## 2020-11-07 RX ADMIN — MELOXICAM 15 MG: 15 TABLET ORAL at 10:31

## 2020-11-07 RX ADMIN — ROPINIROLE HYDROCHLORIDE 0.25 MG: 0.25 TABLET, FILM COATED ORAL at 08:30

## 2020-11-07 RX ADMIN — PANTOPRAZOLE SODIUM 20 MG: 20 TABLET, DELAYED RELEASE ORAL at 08:30

## 2020-11-07 RX ADMIN — PREGABALIN 150 MG: 150 CAPSULE ORAL at 22:10

## 2020-11-07 RX ADMIN — ENOXAPARIN SODIUM 40 MG: 40 INJECTION SUBCUTANEOUS at 08:30

## 2020-11-07 RX ADMIN — Medication 10 ML: at 14:00

## 2020-11-07 RX ADMIN — POTASSIUM CHLORIDE 20 MEQ: 1500 TABLET, EXTENDED RELEASE ORAL at 22:09

## 2020-11-07 RX ADMIN — POTASSIUM CHLORIDE 20 MEQ: 1500 TABLET, EXTENDED RELEASE ORAL at 10:11

## 2020-11-07 RX ADMIN — POTASSIUM CHLORIDE 20 MEQ: 1500 TABLET, EXTENDED RELEASE ORAL at 08:30

## 2020-11-07 RX ADMIN — POTASSIUM PHOSPHATE, MONOBASIC POTASSIUM PHOSPHATE, DIBASIC: 224; 236 INJECTION, SOLUTION, CONCENTRATE INTRAVENOUS at 14:45

## 2020-11-07 RX ADMIN — TRAMADOL HYDROCHLORIDE 25 MG: 50 TABLET, FILM COATED ORAL at 17:35

## 2020-11-07 RX ADMIN — FERROUS SULFATE TAB 325 MG (65 MG ELEMENTAL FE) 325 MG: 325 (65 FE) TAB at 08:29

## 2020-11-07 RX ADMIN — TRAMADOL HYDROCHLORIDE 25 MG: 50 TABLET, FILM COATED ORAL at 08:30

## 2020-11-07 RX ADMIN — TRAMADOL HYDROCHLORIDE 25 MG: 50 TABLET, FILM COATED ORAL at 22:10

## 2020-11-07 RX ADMIN — ROPINIROLE HYDROCHLORIDE 0.25 MG: 0.25 TABLET, FILM COATED ORAL at 22:10

## 2020-11-07 RX ADMIN — TRAMADOL HYDROCHLORIDE 25 MG: 50 TABLET, FILM COATED ORAL at 04:15

## 2020-11-07 RX ADMIN — PREGABALIN 150 MG: 150 CAPSULE ORAL at 08:30

## 2020-11-07 NOTE — PROGRESS NOTES
Hospitalist Progress Note    NAME: Ventura Spencer   :  1960   MRN:  [id number]           Subjective:     Chief Complaint / Reason for Physician Visit  Patient seen and evaluated at bedside, no acute events overnight, patient symptoms have since resolved. Discussed with RN events overnight. Review of Systems:  Symptom Y/N Comments  Symptom Y/N Comments   Fever/Chills N   Chest Pain N    Poor Appetite Y   Edema N    Cough N   Abdominal Pain N    Sputum N   Joint Pain N    SOB/MCCANN N   Pruritis/Rash N    Nausea/vomit N   Tolerating PT/OT NA    Diarrhea N   Tolerating Diet Y    Constipation N   Other       Could NOT obtain due to:    Patient denies any fevers chills vomiting lightheadedness dizziness dyspnea orthopnea paroxysmal nocturnal dyspnea chest pain palpitations headache focal weakness loss of sensation auditory or visual symptoms abdominal stool or urinary complaints or any other associated symptoms  Objective:     VITALS:   Last 24hrs VS reviewed since prior progress note. Most recent are:  Patient Vitals for the past 24 hrs:   Temp Pulse Resp BP SpO2   20 0826 97.4 °F (36.3 °C) 69 18 100/68 99 %   20 0356 97.9 °F (36.6 °C) 74 18 105/64 99 %   20 2337 97.7 °F (36.5 °C) 73 18 105/61 96 %   20 2038 97.8 °F (36.6 °C) 77 18 94/61 95 %   20 1600  75      20 1516 97.9 °F (36.6 °C) 75 20 (!) 95/53 99 %   20 1206 97.8 °F (36.6 °C) 84 20 (!) 95/58 99 %   20 1200  84          Intake/Output Summary (Last 24 hours) at 2020 2455  Last data filed at 2020 0358  Gross per 24 hour   Intake    Output 700 ml   Net -700 ml        PHYSICAL EXAM:  Patient is alert and oriented, appears comfortable does not appear to be in any acute distress, appears weak   EENT:  EOMI. Anicteric sclerae. MMM  Resp:  CTA bilaterally, no wheezing or rales.   No accessory muscle use  CV:  Regular  rhythm, S1 plus S2, no murmurs rubs or gallops  No edema  GI:  Soft, Non distended, Non tender. +Bowel sounds  Neurologic:  Alert and oriented X 3, normal speech,   Psych:   Good insight. Not anxious nor agitated  Skin:  No rashes. No jaundice    Reviewed most current lab test results and cultures  YES  Reviewed most current radiology test results   YES  Review and summation of old records today    NO  Reviewed patient's current orders and MAR    YES  PMH/SH reviewed - no change compared to H&P    Procedures: see electronic medical records for all procedures/Xrays and details which were not copied into this note but were reviewed prior to creation of Plan. LABS:  I reviewed today's most current labs and imaging studies. Pertinent labs include:  Recent Labs     11/05/20 0555 11/04/20  0854   WBC 11.3* 13.7*   HGB 10.4* 16.0   HCT 30.1* 44.0    394     Recent Labs     11/05/20 0555 11/04/20  1437 11/04/20  0854   *  --  143   K 1.9*  --  1.9*   *  --  120*   CO2 14*  --  13*   GLU 87  --  109*   BUN 17  --  20   CREA 1.00  --  1.27*   CA 8.7  --  10.7*   MG  --   --  3.0*   ALB 2.6* 3.1*  --    TBILI 0.5 0.5  --    ALT 34 39  --        Signed:  Radha Campos MD      Assessment / Plan:  Urinary tract infectioncurrently patient does not meet sepsis criteria at this time  Follow-up blood cultures  Follow-up urine cultures  Maintenance IV fluids  Ceftriaxone 1 g IV every 24  Continue to monitor     Hypokalemiacurrently improved significantly, 3.3 this AM.  Aggressively replete potassium and recheck potassium  Nephrology consult appreciated will continue to follow    Hypophosphatemia - replete phosphorus and recheck phosphorus    Acute kidney injurycurrently resolved  Follow-up nephrology recommendations     Peripheral neuropathycontinue home medications     Gastroesophageal reflux diseasecontinue home medications     ProphylaxisLovenox  FENcardiac diet, maintenance IV fluids, replete potassium and magnesium  Full code, patient surrogate decision-maker is her ,   dispositionHome pending clinical improvement, possibly tomorrow    25.0 - 29.9 Overweight / Body mass index is 31.25 kg/m². Code status: Full  Prophylaxis: Lovenox  Recommended Disposition: Home w/Family       ________________________________________________________________________  Care Plan discussed with:    Comments   Patient X    Family      RN X    Care Manager X    Consultant  X                     X Multidiciplinary team rounds were held today with , nursing, pharmacist and clinical coordinator. Patient's plan of care was discussed; medications were reviewed and discharge planning was addressed.      ________________________________________________________________________  Total NON critical care TIME: 35   Minutes        Comments   >50% of visit spent in counseling and coordination of care X    ________________________________________________________________________  Coby Silvestre MD

## 2020-11-08 ENCOUNTER — APPOINTMENT (OUTPATIENT)
Dept: GENERAL RADIOLOGY | Age: 60
DRG: 683 | End: 2020-11-08
Attending: INTERNAL MEDICINE
Payer: COMMERCIAL

## 2020-11-08 VITALS
BODY MASS INDEX: 31.41 KG/M2 | HEART RATE: 83 BPM | DIASTOLIC BLOOD PRESSURE: 66 MMHG | TEMPERATURE: 97.7 F | SYSTOLIC BLOOD PRESSURE: 131 MMHG | WEIGHT: 160 LBS | HEIGHT: 60 IN | OXYGEN SATURATION: 98 % | RESPIRATION RATE: 20 BRPM

## 2020-11-08 LAB
ALBUMIN SERPL-MCNC: 2.8 G/DL (ref 3.5–5)
ANION GAP SERPL CALC-SCNC: 6 MMOL/L (ref 5–15)
BUN SERPL-MCNC: 8 MG/DL (ref 6–20)
BUN/CREAT SERPL: 9 (ref 12–20)
CA-I BLD-MCNC: 8.5 MG/DL (ref 8.5–10.1)
CHLORIDE SERPL-SCNC: 121 MMOL/L (ref 97–108)
CO2 SERPL-SCNC: 18 MMOL/L (ref 21–32)
CORTIS AM PEAK SERPL-MCNC: 3.9 UG/DL (ref 4.3–22.45)
CREAT SERPL-MCNC: 0.93 MG/DL (ref 0.55–1.02)
ERYTHROCYTE [DISTWIDTH] IN BLOOD BY AUTOMATED COUNT: 19.4 % (ref 11.5–14.5)
FSH SERPL-ACNC: 121.6 MIU/ML
GLUCOSE SERPL-MCNC: 74 MG/DL (ref 65–100)
HCT VFR BLD AUTO: 29.4 % (ref 35–47)
HGB BLD-MCNC: 9.9 G/DL (ref 11.5–16)
LH SERPL-ACNC: 60.6 MIU/ML
MCH RBC QN AUTO: 31.5 PG (ref 26–34)
MCHC RBC AUTO-ENTMCNC: 33.7 G/DL (ref 30–36.5)
MCV RBC AUTO: 93.6 FL (ref 80–99)
PHOSPHATE SERPL-MCNC: 3.2 MG/DL (ref 2.6–4.7)
PLATELET # BLD AUTO: 247 K/UL (ref 150–400)
PMV BLD AUTO: 12.6 FL (ref 8.9–12.9)
POTASSIUM SERPL-SCNC: 3.5 MMOL/L (ref 3.5–5.1)
RBC # BLD AUTO: 3.14 M/UL (ref 3.8–5.2)
RHEUMATOID FACT SERPL-ACNC: <10 IU/ML
SODIUM SERPL-SCNC: 145 MMOL/L (ref 136–145)
WBC # BLD AUTO: 9.9 K/UL (ref 3.6–11)

## 2020-11-08 PROCEDURE — 36415 COLL VENOUS BLD VENIPUNCTURE: CPT

## 2020-11-08 PROCEDURE — 85027 COMPLETE CBC AUTOMATED: CPT

## 2020-11-08 PROCEDURE — 71045 X-RAY EXAM CHEST 1 VIEW: CPT

## 2020-11-08 PROCEDURE — 83002 ASSAY OF GONADOTROPIN (LH): CPT

## 2020-11-08 PROCEDURE — 83001 ASSAY OF GONADOTROPIN (FSH): CPT

## 2020-11-08 PROCEDURE — 82024 ASSAY OF ACTH: CPT

## 2020-11-08 PROCEDURE — 74011250636 HC RX REV CODE- 250/636: Performed by: INTERNAL MEDICINE

## 2020-11-08 PROCEDURE — 82533 TOTAL CORTISOL: CPT

## 2020-11-08 PROCEDURE — 80069 RENAL FUNCTION PANEL: CPT

## 2020-11-08 PROCEDURE — 74011250637 HC RX REV CODE- 250/637: Performed by: INTERNAL MEDICINE

## 2020-11-08 RX ORDER — METHYLPREDNISOLONE 4 MG/1
TABLET ORAL
Qty: 1 DOSE PACK | Refills: 0 | Status: SHIPPED | OUTPATIENT
Start: 2020-11-08 | End: 2021-06-26

## 2020-11-08 RX ORDER — MELOXICAM 15 MG/1
15 TABLET ORAL DAILY
Qty: 30 TAB | Refills: 0 | Status: SHIPPED | OUTPATIENT
Start: 2020-11-08 | End: 2021-06-26

## 2020-11-08 RX ORDER — SODIUM BICARBONATE 650 MG/1
650 TABLET ORAL 3 TIMES DAILY
Qty: 90 TAB | Refills: 0 | Status: SHIPPED | OUTPATIENT
Start: 2020-11-08 | End: 2021-06-26

## 2020-11-08 RX ORDER — POTASSIUM CHLORIDE 20 MEQ/1
40 TABLET, EXTENDED RELEASE ORAL
Status: COMPLETED | OUTPATIENT
Start: 2020-11-08 | End: 2020-11-08

## 2020-11-08 RX ORDER — POTASSIUM CHLORIDE 20 MEQ/1
20 TABLET, EXTENDED RELEASE ORAL EVERY 6 HOURS
Qty: 60 TAB | Refills: 0 | Status: SHIPPED | OUTPATIENT
Start: 2020-11-08 | End: 2021-06-26

## 2020-11-08 RX ORDER — ROPINIROLE 0.25 MG/1
0.25 TABLET, FILM COATED ORAL 3 TIMES DAILY
Qty: 90 TAB | Refills: 0 | Status: SHIPPED | OUTPATIENT
Start: 2020-11-08

## 2020-11-08 RX ORDER — OMEPRAZOLE 20 MG/1
20 TABLET, DELAYED RELEASE ORAL DAILY
Qty: 30 TAB | Refills: 0 | Status: ON HOLD | OUTPATIENT
Start: 2020-11-08 | End: 2022-03-16 | Stop reason: SDUPTHER

## 2020-11-08 RX ORDER — LANOLIN ALCOHOL/MO/W.PET/CERES
325 CREAM (GRAM) TOPICAL
Qty: 30 TAB | Refills: 0 | Status: SHIPPED | OUTPATIENT
Start: 2020-11-09

## 2020-11-08 RX ORDER — PAROXETINE HYDROCHLORIDE 40 MG/1
40 TABLET, FILM COATED ORAL DAILY
Qty: 30 TAB | Refills: 0 | Status: SHIPPED | OUTPATIENT
Start: 2020-11-08

## 2020-11-08 RX ORDER — PREGABALIN 150 MG/1
150 CAPSULE ORAL 2 TIMES DAILY
Qty: 60 CAP | Refills: 0 | Status: ON HOLD | OUTPATIENT
Start: 2020-11-08 | End: 2022-03-16 | Stop reason: SDUPTHER

## 2020-11-08 RX ORDER — LEVOFLOXACIN 500 MG/1
500 TABLET, FILM COATED ORAL DAILY
Qty: 5 TAB | Refills: 0 | Status: SHIPPED | OUTPATIENT
Start: 2020-11-08 | End: 2021-06-26

## 2020-11-08 RX ADMIN — ENOXAPARIN SODIUM 40 MG: 40 INJECTION SUBCUTANEOUS at 08:42

## 2020-11-08 RX ADMIN — TRAMADOL HYDROCHLORIDE 25 MG: 50 TABLET, FILM COATED ORAL at 08:53

## 2020-11-08 RX ADMIN — POTASSIUM CHLORIDE 20 MEQ: 1500 TABLET, EXTENDED RELEASE ORAL at 08:43

## 2020-11-08 RX ADMIN — FERROUS SULFATE TAB 325 MG (65 MG ELEMENTAL FE) 325 MG: 325 (65 FE) TAB at 08:43

## 2020-11-08 RX ADMIN — Medication 10 ML: at 01:32

## 2020-11-08 RX ADMIN — PAROXETINE HYDROCHLORIDE 40 MG: 20 TABLET, FILM COATED ORAL at 08:43

## 2020-11-08 RX ADMIN — ROPINIROLE HYDROCHLORIDE 0.25 MG: 0.25 TABLET, FILM COATED ORAL at 08:43

## 2020-11-08 RX ADMIN — MELOXICAM 15 MG: 15 TABLET ORAL at 10:46

## 2020-11-08 RX ADMIN — POTASSIUM CHLORIDE 40 MEQ: 1500 TABLET, EXTENDED RELEASE ORAL at 09:11

## 2020-11-08 RX ADMIN — METHYLPREDNISOLONE SODIUM SUCCINATE 125 MG: 40 INJECTION, POWDER, FOR SOLUTION INTRAMUSCULAR; INTRAVENOUS at 10:46

## 2020-11-08 RX ADMIN — PANTOPRAZOLE SODIUM 20 MG: 20 TABLET, DELAYED RELEASE ORAL at 08:43

## 2020-11-08 RX ADMIN — PREGABALIN 150 MG: 150 CAPSULE ORAL at 08:43

## 2020-11-08 NOTE — PROGRESS NOTES
Renal Progress Note    Patient: Gaye Lawler MRN: [id number]  SSN: [id number]    YOB: 1960  Age: 64 y.o. Sex: female      Admit Date: 11/4/2020    LOS: 3 days     Subjective:   Patient seen at bedside. Alert and awake, no acute distress. Patient is feeling better, not giving any new complaints today. No complaints of any swelling in lower extremities.    No complaints of cramps  Weakness has improved  K is 3.3 today    Current Facility-Administered Medications   Medication Dose Route Frequency    albuterol (PROVENTIL HFA, VENTOLIN HFA, PROAIR HFA) inhaler 2 Puff  2 Puff Inhalation Q4H PRN    potassium chloride (K-DUR, KLOR-CON) SR tablet 20 mEq  20 mEq Oral Q6H    traMADoL (ULTRAM) tablet 25 mg  25 mg Oral Q6H PRN    0.45% sodium chloride with KCl 20 mEq/L infusion   IntraVENous CONTINUOUS    ferrous sulfate tablet 325 mg  325 mg Oral ACB    meloxicam (MOBIC) tablet 15 mg  15 mg Oral DAILY    pantoprazole (PROTONIX) tablet 20 mg  20 mg Oral DAILY    PARoxetine (PAXIL) tablet 40 mg  40 mg Oral DAILY    pregabalin (LYRICA) capsule 150 mg  150 mg Oral BID    rOPINIRole (REQUIP) tablet 0.25 mg  0.25 mg Oral TID    cefTRIAXone (ROCEPHIN) 1 g in 0.9% sodium chloride (MBP/ADV) 50 mL MBP  1 g IntraVENous Q24H    sodium chloride (NS) flush 5-40 mL  5-40 mL IntraVENous Q8H    sodium chloride (NS) flush 5-40 mL  5-40 mL IntraVENous PRN    acetaminophen (TYLENOL) tablet 650 mg  650 mg Oral Q6H PRN    Or    acetaminophen (TYLENOL) suppository 650 mg  650 mg Rectal Q6H PRN    polyethylene glycol (MIRALAX) packet 17 g  17 g Oral DAILY PRN    promethazine (PHENERGAN) tablet 12.5 mg  12.5 mg Oral Q6H PRN    Or    ondansetron (ZOFRAN) injection 4 mg  4 mg IntraVENous Q6H PRN    enoxaparin (LOVENOX) injection 40 mg  40 mg SubCUTAneous DAILY        Vitals:    11/07/20 0826 11/07/20 1223 11/07/20 1553 11/07/20 2027   BP: 100/68 98/66 105/67 (!) 94/59   Pulse: 69 (!) 112 84 77   Resp: 18 18 18 20   Temp: 97.4 °F (36.3 °C) 97.8 °F (36.6 °C) 98 °F (36.7 °C) 98.2 °F (36.8 °C)   SpO2: 99% 100% 99% 100%   Weight:       Height:         Objective:   General: alert awake well-oriented, no acute distress. HEENT: EOMI, no Icterus, no Pallor,  mucosa moist.  Neck: Neck is supple, No JVD  Lungs: breathsounds normal, no respiratory distress on inspection, no rhonchi, no rales,   CVS: heart sounds normal,  no murmurs, no rubs. GI: soft, nontender, normal BS. Extremeties: no cyanosis, no edema,   Neuro: Alert, awake, oriented x3, No new focal deficits, moving all extremeties well. Skin: normal skin turgor, no skin rashes. Intake and Output:  Current Shift: No intake/output data recorded. Last three shifts: 11/06 0701 - 11/07 1900  In: -   Out: 700 [Urine:700]      Lab/Data Review:  Recent Labs     11/07/20  0710 11/06/20  0700 11/05/20  0555   WBC 11.2* 13.0* 11.3*   HGB 10.4* 10.4* 10.4*   HCT 30.6* 29.6* 30.1*    262 290     Recent Labs     11/07/20  0710 11/06/20  0700 11/05/20  2142 11/05/20  0555    145 148* 148*   K 3.3* 2.8* 2.4* 1.9*   * 120* 120* 125*   CO2 18* 21 20* 14*   GLU 91 79 126* 87   BUN 11 14 17 17   CREA 0.90 0.97 1.36* 1.00   CA 8.5 8.3*  8.3* 7.9* 8.7   MG  --  2.1 2.1  --    PHOS 2.2* 0.9*  --   --    ALB  --  2.5*  --  2.6*   TBILI  --   --   --  0.5   ALT  --   --   --  34     No results for input(s): PH, PCO2, PO2, HCO3, FIO2 in the last 72 hours.   Recent Results (from the past 24 hour(s))   CBC W/O DIFF    Collection Time: 11/07/20  7:10 AM   Result Value Ref Range    WBC 11.2 (H) 3.6 - 11.0 K/uL    RBC 3.32 (L) 3.80 - 5.20 M/uL    HGB 10.4 (L) 11.5 - 16.0 g/dL    HCT 30.6 (L) 35.0 - 47.0 %    MCV 92.2 80.0 - 99.0 FL    MCH 31.3 26.0 - 34.0 PG    MCHC 34.0 30.0 - 36.5 g/dL    RDW 18.9 (H) 11.5 - 14.5 %    PLATELET 969 616 - 922 K/uL    MPV 12.4 8.9 - 10.0 FL   METABOLIC PANEL, BASIC    Collection Time: 11/07/20  7:10 AM   Result Value Ref Range    Sodium 145 136 - 145 mmol/L    Potassium 3.3 (L) 3.5 - 5.1 mmol/L    Chloride 121 (H) 97 - 108 mmol/L    CO2 18 (L) 21 - 32 mmol/L    Anion gap 6 5 - 15 mmol/L    Glucose 91 65 - 100 mg/dL    BUN 11 6 - 20 mg/dL    Creatinine 0.90 0.55 - 1.02 mg/dL    BUN/Creatinine ratio 12 12 - 20      GFR est AA >60 >60 ml/min/1.73m2    GFR est non-AA >60 >60 ml/min/1.73m2    Calcium 8.5 8.5 - 10.1 mg/dL   PHOSPHORUS    Collection Time: 11/07/20  7:10 AM   Result Value Ref Range    Phosphorus 2.2 (L) 2.6 - 4.7 mg/dL   CORTISOL, AM    Collection Time: 11/07/20  7:10 AM   Result Value Ref Range    Cortisol, a.m. 5.4 4.30 - 22.45 ug/dL   TSH 3RD GENERATION    Collection Time: 11/07/20  7:10 AM   Result Value Ref Range    TSH 4.05 (H) 0.36 - 3.74 uIU/mL        Assessment and Plan:     1 severe hypokalemia: Symptomatic with muscle weakness  Hypokalemia appears to be chronic in nature  Existence of hypokalemia along with unexplained metabolic acidosis is probably with distal type 1 renal tubular acidosis    Repeat urine random K and creatinine    plasma renin, aldosterone pending, random cortisol is borderline low    Recommend work-up to check suspected etiology of type I RTA like connective tissue  Disorders/renal calculi/calciuria/hyperparathyroidism.     Type I RTA can also develop with use chronic use of NSAIDs, will advise patient to minimize the use of NSAIDs and try more of tramadol. continue potassium chloride 20 mEq every 6 hours     2. Metabolic acidosis; nongap acidosis   related to the type 1 renal tubular acidosis  Will give sodium bicarb 650 tid as a maintenance dose  Will continue to monitor     3. Hypotension: Patient is not on any antihypertensive medications. Continue IV fluids for 1 more day  Borderline high TSH and borderline low cortisol  Check ACTH, FH. LSH, and repeat cortisol tomorrow to rule out pituitary def     4.  Generalized weakness: Probably related to severe hypokalemia and metabolic acidosis.  improved clinically   Will continue monitor symptoms with correction of potassium     4.  Osteoarthritis/neck pains: Appears to be chronic  Continue home medications.         Signed By: Gucci Garcia MD     November 7, 2020

## 2020-11-08 NOTE — DISCHARGE SUMMARY
Hospitalist Discharge Summary     Patient ID:  Justice Everett  [id number]  64 y.o.  1960  11/4/2020    PCP on record: UNKNOWN    Admit date: 11/4/2020  Discharge date and time: 11/8/2020    DISCHARGE DIAGNOSIS:    UTI/Hypokalemia    CONSULTATIONS:  IP CONSULT TO NEPHROLOGY    Excerpted HPI from H&P of Trevor Bernabe MD:  57-year-old female with past medical history of peripheral neuropathy, gastroesophageal reflux disease, iron deficiency anemia, hypokalemia, chronic pain presents to [facility] with complaints of generalized weakness. Patient states she was in her usual state of health until few days back when she started experiencing gradual onset persistent progressive generalized weakness following which patient presented to the ED. Patient denies any fevers chills vomiting lightheadedness dizziness dyspnea orthopnea paroxysmal nocturnal dyspnea chest pain palpitations headache focal weakness loss of sensation auditory or visual symptoms abdominal stool or urinary complaints or any other associated symptoms. Patient endorses no recent sick contacts or travel activity, of note patient recently moved to the area from [place].    ______________________________________________________________________  DISCHARGE SUMMARY/HOSPITAL COURSE:  for full details see H&P, daily progress notes, labs, consult notes.      Patient was subsequently admitted to [facility] with a diagnosis of urinary tract infection as well as severe hypokalemia, of note patient's potassium was aggressively repleted, patient was placed on IV antibiotics, patient was evaluated by nephrology, of note during the course of the admission patient's serum potassium had improved, patient was placed on oral potassium supplementation, patient remained afebrile, patient developed a COPD exacerbation during admission, patient received Solu-Medrol, patient was not hypoxic following which patient was deemed stable for discharge on oral steroids as well as oral antibiotics with outpatient follow-up with pulmonology as well as nephrology as well as establishment of care with a new primary care physician. The plan was explained to the patient who is in agreement with plan at this time. _______________________________________________________________________  Patient seen and examined by me on discharge day. Pertinent Findings:  Gen:    Not in distress  Chest: Clear lungs  CVS:   Regular rhythm. No edema  Abd:  Soft, not distended, not tender  Neuro:  Alert, Oriented x 4  _______________________________________________________________________  DISCHARGE MEDICATIONS:   Current Discharge Medication List      START taking these medications    Details   ferrous sulfate 325 mg (65 mg iron) tablet Take 1 Tab by mouth Daily (before breakfast). Qty: 30 Tab, Refills: 0      potassium chloride (K-DUR, KLOR-CON) 20 mEq tablet Take 1 Tab by mouth every six (6) hours. Qty: 60 Tab, Refills: 0      sodium bicarbonate 650 mg tablet Take 1 Tab by mouth three (3) times daily. Qty: 90 Tab, Refills: 0      levoFLOXacin (Levaquin) 500 mg tablet Take 1 Tab by mouth daily. Qty: 5 Tab, Refills: 0      methylPREDNISolone (MEDROL DOSEPACK) 4 mg tablet As Directed  Qty: 1 Dose Pack, Refills: 0         CONTINUE these medications which have CHANGED    Details   meloxicam (MOBIC) 15 mg tablet Take 1 Tab by mouth daily. Qty: 30 Tab, Refills: 0      omeprazole (PriLOSEC OTC) 20 mg tablet Take 1 Tab by mouth daily. Qty: 30 Tab, Refills: 0      PARoxetine (PAXIL) 40 mg tablet Take 1 Tab by mouth daily. Qty: 30 Tab, Refills: 0      pregabalin (LYRICA) 150 mg capsule Take 1 Cap by mouth two (2) times a day. Max Daily Amount: 300 mg. Qty: 60 Cap, Refills: 0    Associated Diagnoses: Idiopathic peripheral neuropathy      rOPINIRole (REQUIP) 0.25 mg tablet Take 1 Tab by mouth three (3) times daily.   Qty: 90 Tab, Refills: 0 STOP taking these medications       ferrous sulfate 324 mg (65 mg iron) tablet Comments:   Reason for Stopping:                 Patient Follow Up Instructions: Activity: Activity as tolerated  Diet: Cardiac Diet  Wound Care: None needed    Follow-up with Dr. Joo Emerson. Erik in 1 week. Follow-up tests/labs As per above physicians  Follow-up Information     Follow up With Specialties Details Why Contact Info    UNKNOWN        Moisés Tariq MD Family Medicine In 1 week  [place] [id number]  [phone]      Stefani Hull MD Nephrology In 1 week  [facility]  [phone]      Jordyn Parada MD Pulmonary Disease, Internal Medicine In 1 week  [address]  [phone]          ________________________________________________________________    Risk of deterioration: Low    Condition at Discharge:  Stable  __________________________________________________________________    Disposition  Home with family, no needs    ____________________________________________________________________    Code Status: Full Code  ___________________________________________________________________      Total time in minutes spent coordinating this discharge (includes going over instructions, follow-up, prescriptions, and preparing report for sign off to her PCP) :  40 minutes    Signed:   Giovanna Marin MD

## 2020-11-08 NOTE — PROGRESS NOTES
Discharge Note:  Pt. Discharged home to self care. Verbalized understanding of discharge instructions, medications, and when to follow up with MD. Vital signs stable, IV and telemetry D/C. Pt. Was wheeled down to her car, no complaints at time of discharge.

## 2020-11-10 LAB
ACTH PLAS-MCNC: 18.7 PG/ML
BACTERIA SPEC CULT: NORMAL
C3 SERPL-MCNC: 98 MG/DL
C4 SERPL-MCNC: 26 MG/DL
SPECIAL REQUESTS,SREQ: NORMAL

## 2021-03-22 LAB
ALDOST SERPL-MCNC: 2.2 NG/DL
RENIN PLAS-CCNC: 0.78 NG/ML/HR
SPECIMEN SOURCE: NORMAL

## 2021-06-24 ENCOUNTER — HOSPITAL ENCOUNTER (INPATIENT)
Age: 61
LOS: 2 days | Discharge: HOME | DRG: 683 | End: 2021-06-26
Attending: EMERGENCY MEDICINE | Admitting: FAMILY MEDICINE
Payer: COMMERCIAL

## 2021-06-24 DIAGNOSIS — E87.6 HYPOKALEMIA: ICD-10-CM

## 2021-06-24 DIAGNOSIS — E87.20 METABOLIC ACIDOSIS: ICD-10-CM

## 2021-06-24 DIAGNOSIS — R53.1 WEAKNESS: Primary | ICD-10-CM

## 2021-06-24 DIAGNOSIS — N17.9 ACUTE RENAL FAILURE, UNSPECIFIED ACUTE RENAL FAILURE TYPE (HCC): ICD-10-CM

## 2021-06-24 LAB
ALBUMIN SERPL-MCNC: 4 G/DL (ref 3.5–5)
ALBUMIN/GLOB SERPL: 0.9 {RATIO} (ref 1.1–2.2)
ALP SERPL-CCNC: 76 U/L (ref 45–117)
ALT SERPL-CCNC: 12 U/L (ref 12–78)
ANION GAP SERPL CALC-SCNC: 12 MMOL/L (ref 5–15)
AST SERPL W P-5'-P-CCNC: 7 U/L (ref 15–37)
BASOPHILS # BLD: 0.1 K/UL (ref 0–0.1)
BASOPHILS NFR BLD: 1 % (ref 0–1)
BILIRUB SERPL-MCNC: 0.4 MG/DL (ref 0.2–1)
BUN SERPL-MCNC: 25 MG/DL (ref 6–20)
BUN/CREAT SERPL: 14 (ref 12–20)
CA-I BLD-MCNC: 9.5 MG/DL (ref 8.5–10.1)
CHLORIDE SERPL-SCNC: 114 MMOL/L (ref 97–108)
CO2 SERPL-SCNC: 14 MMOL/L (ref 21–32)
CREAT SERPL-MCNC: 1.8 MG/DL (ref 0.55–1.02)
DIFFERENTIAL METHOD BLD: ABNORMAL
EOSINOPHIL # BLD: 0.1 K/UL (ref 0–0.4)
EOSINOPHIL NFR BLD: 2 % (ref 0–7)
ERYTHROCYTE [DISTWIDTH] IN BLOOD BY AUTOMATED COUNT: 14.6 % (ref 11.5–14.5)
GLOBULIN SER CALC-MCNC: 4.4 G/DL (ref 2–4)
GLUCOSE SERPL-MCNC: 134 MG/DL (ref 65–100)
HCT VFR BLD AUTO: 45.1 % (ref 35–47)
HGB BLD-MCNC: 15.2 G/DL (ref 11.5–16)
IMM GRANULOCYTES # BLD AUTO: 0 K/UL (ref 0–0.04)
IMM GRANULOCYTES NFR BLD AUTO: 0 % (ref 0–0.5)
LYMPHOCYTES # BLD: 2.5 K/UL (ref 0.8–3.5)
LYMPHOCYTES NFR BLD: 29 % (ref 12–49)
MAGNESIUM SERPL-MCNC: 2.2 MG/DL (ref 1.6–2.4)
MCH RBC QN AUTO: 29.9 PG (ref 26–34)
MCHC RBC AUTO-ENTMCNC: 33.7 G/DL (ref 30–36.5)
MCV RBC AUTO: 88.8 FL (ref 80–99)
MONOCYTES # BLD: 0.6 K/UL (ref 0–1)
MONOCYTES NFR BLD: 8 % (ref 5–13)
NEUTS SEG # BLD: 5.2 K/UL (ref 1.8–8)
NEUTS SEG NFR BLD: 60 % (ref 32–75)
NRBC # BLD: 0 K/UL (ref 0–0.01)
NRBC BLD-RTO: 0 PER 100 WBC
PLATELET # BLD AUTO: 427 K/UL (ref 150–400)
PMV BLD AUTO: 10.9 FL (ref 8.9–12.9)
POTASSIUM SERPL-SCNC: 2.5 MMOL/L (ref 3.5–5.1)
PROT SERPL-MCNC: 8.4 G/DL (ref 6.4–8.2)
RBC # BLD AUTO: 5.08 M/UL (ref 3.8–5.2)
SODIUM SERPL-SCNC: 140 MMOL/L (ref 136–145)
WBC # BLD AUTO: 8.6 K/UL (ref 3.6–11)

## 2021-06-24 PROCEDURE — 87186 SC STD MICRODIL/AGAR DIL: CPT

## 2021-06-24 PROCEDURE — 74011250637 HC RX REV CODE- 250/637: Performed by: FAMILY MEDICINE

## 2021-06-24 PROCEDURE — 93005 ELECTROCARDIOGRAM TRACING: CPT

## 2021-06-24 PROCEDURE — 74011250636 HC RX REV CODE- 250/636: Performed by: INTERNAL MEDICINE

## 2021-06-24 PROCEDURE — 74011250637 HC RX REV CODE- 250/637: Performed by: EMERGENCY MEDICINE

## 2021-06-24 PROCEDURE — 81001 URINALYSIS AUTO W/SCOPE: CPT

## 2021-06-24 PROCEDURE — 74011000250 HC RX REV CODE- 250: Performed by: EMERGENCY MEDICINE

## 2021-06-24 PROCEDURE — 96375 TX/PRO/DX INJ NEW DRUG ADDON: CPT

## 2021-06-24 PROCEDURE — 74011000250 HC RX REV CODE- 250: Performed by: INTERNAL MEDICINE

## 2021-06-24 PROCEDURE — 96365 THER/PROPH/DIAG IV INF INIT: CPT

## 2021-06-24 PROCEDURE — 74011250636 HC RX REV CODE- 250/636: Performed by: FAMILY MEDICINE

## 2021-06-24 PROCEDURE — 65270000029 HC RM PRIVATE

## 2021-06-24 PROCEDURE — 74011250636 HC RX REV CODE- 250/636: Performed by: EMERGENCY MEDICINE

## 2021-06-24 PROCEDURE — 87086 URINE CULTURE/COLONY COUNT: CPT

## 2021-06-24 PROCEDURE — 36415 COLL VENOUS BLD VENIPUNCTURE: CPT

## 2021-06-24 PROCEDURE — 87077 CULTURE AEROBIC IDENTIFY: CPT

## 2021-06-24 PROCEDURE — 99285 EMERGENCY DEPT VISIT HI MDM: CPT

## 2021-06-24 PROCEDURE — 80053 COMPREHEN METABOLIC PANEL: CPT

## 2021-06-24 PROCEDURE — 83735 ASSAY OF MAGNESIUM: CPT

## 2021-06-24 PROCEDURE — 74011250637 HC RX REV CODE- 250/637: Performed by: INTERNAL MEDICINE

## 2021-06-24 PROCEDURE — 85025 COMPLETE CBC W/AUTO DIFF WBC: CPT

## 2021-06-24 RX ORDER — ROPINIROLE 0.25 MG/1
0.25 TABLET, FILM COATED ORAL 3 TIMES DAILY
Status: DISCONTINUED | OUTPATIENT
Start: 2021-06-24 | End: 2021-06-26 | Stop reason: HOSPADM

## 2021-06-24 RX ORDER — HEPARIN SODIUM 5000 [USP'U]/ML
5000 INJECTION, SOLUTION INTRAVENOUS; SUBCUTANEOUS EVERY 8 HOURS
Status: DISCONTINUED | OUTPATIENT
Start: 2021-06-24 | End: 2021-06-26 | Stop reason: HOSPADM

## 2021-06-24 RX ORDER — ACETAMINOPHEN 650 MG/1
650 SUPPOSITORY RECTAL
Status: DISCONTINUED | OUTPATIENT
Start: 2021-06-24 | End: 2021-06-26 | Stop reason: HOSPADM

## 2021-06-24 RX ORDER — POTASSIUM CHLORIDE 20 MEQ/1
40 TABLET, EXTENDED RELEASE ORAL 3 TIMES DAILY
Status: DISCONTINUED | OUTPATIENT
Start: 2021-06-24 | End: 2021-06-25

## 2021-06-24 RX ORDER — POTASSIUM CHLORIDE 7.45 MG/ML
10 INJECTION INTRAVENOUS ONCE
Status: COMPLETED | OUTPATIENT
Start: 2021-06-24 | End: 2021-06-24

## 2021-06-24 RX ORDER — SODIUM BICARBONATE 1 MEQ/ML
50 SYRINGE (ML) INTRAVENOUS
Status: COMPLETED | OUTPATIENT
Start: 2021-06-24 | End: 2021-06-24

## 2021-06-24 RX ORDER — POLYETHYLENE GLYCOL 3350 17 G/17G
17 POWDER, FOR SOLUTION ORAL DAILY PRN
Status: DISCONTINUED | OUTPATIENT
Start: 2021-06-24 | End: 2021-06-26 | Stop reason: HOSPADM

## 2021-06-24 RX ORDER — ONDANSETRON 2 MG/ML
4 INJECTION INTRAMUSCULAR; INTRAVENOUS
Status: DISCONTINUED | OUTPATIENT
Start: 2021-06-24 | End: 2021-06-26 | Stop reason: HOSPADM

## 2021-06-24 RX ORDER — POTASSIUM CHLORIDE 20 MEQ/1
40 TABLET, EXTENDED RELEASE ORAL
Status: COMPLETED | OUTPATIENT
Start: 2021-06-24 | End: 2021-06-24

## 2021-06-24 RX ORDER — ACETAMINOPHEN 325 MG/1
650 TABLET ORAL
Status: DISCONTINUED | OUTPATIENT
Start: 2021-06-24 | End: 2021-06-26 | Stop reason: HOSPADM

## 2021-06-24 RX ORDER — CALCIUM CARBONATE 200(500)MG
400 TABLET,CHEWABLE ORAL
Status: DISCONTINUED | OUTPATIENT
Start: 2021-06-24 | End: 2021-06-25

## 2021-06-24 RX ORDER — LANOLIN ALCOHOL/MO/W.PET/CERES
325 CREAM (GRAM) TOPICAL
Status: DISCONTINUED | OUTPATIENT
Start: 2021-06-25 | End: 2021-06-26 | Stop reason: HOSPADM

## 2021-06-24 RX ORDER — PAROXETINE 10 MG/1
40 TABLET, FILM COATED ORAL DAILY
Status: DISCONTINUED | OUTPATIENT
Start: 2021-06-25 | End: 2021-06-26 | Stop reason: HOSPADM

## 2021-06-24 RX ORDER — PANTOPRAZOLE SODIUM 40 MG/1
40 TABLET, DELAYED RELEASE ORAL DAILY
Status: DISCONTINUED | OUTPATIENT
Start: 2021-06-24 | End: 2021-06-26 | Stop reason: HOSPADM

## 2021-06-24 RX ORDER — SODIUM BICARBONATE 650 MG/1
650 TABLET ORAL 3 TIMES DAILY
Status: DISCONTINUED | OUTPATIENT
Start: 2021-06-24 | End: 2021-06-25

## 2021-06-24 RX ORDER — ONDANSETRON 4 MG/1
4 TABLET, ORALLY DISINTEGRATING ORAL
Status: DISCONTINUED | OUTPATIENT
Start: 2021-06-24 | End: 2021-06-26 | Stop reason: HOSPADM

## 2021-06-24 RX ORDER — SODIUM CHLORIDE 9 MG/ML
75 INJECTION, SOLUTION INTRAVENOUS CONTINUOUS
Status: DISCONTINUED | OUTPATIENT
Start: 2021-06-24 | End: 2021-06-24

## 2021-06-24 RX ORDER — PANTOPRAZOLE SODIUM 40 MG/1
40 TABLET, DELAYED RELEASE ORAL DAILY
Status: DISCONTINUED | OUTPATIENT
Start: 2021-06-25 | End: 2021-06-24

## 2021-06-24 RX ORDER — POTASSIUM CHLORIDE 20 MEQ/1
40 TABLET, EXTENDED RELEASE ORAL
Status: DISCONTINUED | OUTPATIENT
Start: 2021-06-24 | End: 2021-06-24

## 2021-06-24 RX ADMIN — CALCIUM CARBONATE 400 MG: 500 TABLET, CHEWABLE ORAL at 20:26

## 2021-06-24 RX ADMIN — POTASSIUM CHLORIDE: 2 INJECTION, SOLUTION, CONCENTRATE INTRAVENOUS at 21:58

## 2021-06-24 RX ADMIN — POTASSIUM CHLORIDE 10 MEQ: 7.46 INJECTION, SOLUTION INTRAVENOUS at 18:03

## 2021-06-24 RX ADMIN — SODIUM CHLORIDE 1000 ML: 9 INJECTION, SOLUTION INTRAVENOUS at 15:48

## 2021-06-24 RX ADMIN — POTASSIUM CHLORIDE 40 MEQ: 1500 TABLET, EXTENDED RELEASE ORAL at 15:44

## 2021-06-24 RX ADMIN — SODIUM BICARBONATE 650 MG TABLET 650 MG: at 20:26

## 2021-06-24 RX ADMIN — PREGABALIN 150 MG: 100 CAPSULE ORAL at 20:26

## 2021-06-24 RX ADMIN — POTASSIUM CHLORIDE 10 MEQ: 7.46 INJECTION, SOLUTION INTRAVENOUS at 15:49

## 2021-06-24 RX ADMIN — SODIUM CHLORIDE 75 ML/HR: 9 INJECTION, SOLUTION INTRAVENOUS at 18:03

## 2021-06-24 RX ADMIN — ROPINIROLE HYDROCHLORIDE 0.25 MG: 0.25 TABLET, FILM COATED ORAL at 17:16

## 2021-06-24 RX ADMIN — POTASSIUM CHLORIDE 40 MEQ: 1500 TABLET, EXTENDED RELEASE ORAL at 20:26

## 2021-06-24 RX ADMIN — SODIUM BICARBONATE 50 MEQ: 84 INJECTION, SOLUTION INTRAVENOUS at 15:46

## 2021-06-24 RX ADMIN — ROPINIROLE HYDROCHLORIDE 0.25 MG: 0.25 TABLET, FILM COATED ORAL at 20:26

## 2021-06-24 RX ADMIN — HEPARIN SODIUM 5000 UNITS: 5000 INJECTION INTRAVENOUS; SUBCUTANEOUS at 18:03

## 2021-06-24 RX ADMIN — PANTOPRAZOLE SODIUM 40 MG: 40 TABLET, DELAYED RELEASE ORAL at 20:26

## 2021-06-24 NOTE — ACP (ADVANCE CARE PLANNING)
Advance Care Planning   Healthcare Decision Maker:       Primary Decision Maker: Christina Krishna - [phone]

## 2021-06-24 NOTE — CONSULTS
NAME:  Jeannette Negron   :   1960   MRN:   [id number]     ATTENDING: Savanah Hays MD  PCP:  Noel Aaron MD    Date/Time:  2021 4:06 PM      Subjective:   REQUESTING PHYSICIAN: Dr. Barton Amend:   Hypokalemia. Met acidosis     Ms. Marisa Mccallum is a 46-year-old old white female with past medical history of gastroesophageal reflux disease peripheral neuropathy chronic history of hypokalemia presented to the emergency room with complaints of nausea and generalized weakness. Initial labs showed a very low potassium 2.5, low co2 of 14, a nephrology consultation is requested for further evaluate management of hypokalemia, LOLITA. Patient seen in ER, she is alert and awake, feeling weak and tires, having nausea and had intermittent vomitings, no diarrhea. Patient has history of hypokalemia she was after taking potassium supplements, she is known to me from recent hospitalization and her chronic hypokalemia is suspected to be related to a type I RTA. She denies taking any diuretics. She was taking meloxicam for management of her chronic neck and joint pains. No history of chronic kidney disease. No complaints of any cramps. She borderline hypotension with blood pressure at 101/71. no complaints of any fever or chills.   Patient has complaints of generalized muscle weakness      Past Medical History:   Diagnosis Date    Chronic obstructive pulmonary disease (HCC)     Chronic pain     neck, slipped disc    Ectopic pregnancy     GERD (gastroesophageal reflux disease)     Heart murmur     History of low potassium     Hypokalemia     Ill-defined condition     murmur    Psychiatric disorder     depression      Past Surgical History:   Procedure Laterality Date    HX APPENDECTOMY      HX OTHER SURGICAL      needle removal left arm     Social History     Tobacco Use    Smoking status: Current Every Day Smoker     Packs/day: 0.25    Smokeless tobacco: Never Used   Substance Use Topics    Alcohol use: Never      History reviewed. No pertinent family history. No Known Allergies   Prior to Admission medications    Medication Sig Start Date End Date Taking? Authorizing Provider   ferrous sulfate 325 mg (65 mg iron) tablet Take 1 Tab by mouth Daily (before breakfast). 11/9/20   Yvette Hatfield MD   meloxicam (MOBIC) 15 mg tablet Take 1 Tab by mouth daily. 11/8/20   Yvette Hatfield MD   omeprazole (PriLOSEC OTC) 20 mg tablet Take 1 Tab by mouth daily. 11/8/20   Yvette Hatfield MD   PARoxetine (PAXIL) 40 mg tablet Take 1 Tab by mouth daily. 11/8/20   Yvette Hatfield MD   pregabalin (LYRICA) 150 mg capsule Take 1 Cap by mouth two (2) times a day. Max Daily Amount: 300 mg. 11/8/20   Yvette Hatfield MD   rOPINIRole (REQUIP) 0.25 mg tablet Take 1 Tab by mouth three (3) times daily. 11/8/20   Yvette Hatfield MD   potassium chloride (K-DUR, KLOR-CON) 20 mEq tablet Take 1 Tab by mouth every six (6) hours. 11/8/20   Yvette Hatfield MD   sodium bicarbonate 650 mg tablet Take 1 Tab by mouth three (3) times daily. 11/8/20   Yvette Hatfield MD   levoFLOXacin (Levaquin) 500 mg tablet Take 1 Tab by mouth daily.  11/8/20   Yvette Hatfield MD   methylPREDNISolone ([facility]) 4 mg tablet As Directed 11/8/20   Yvette Hatfield MD     Current Facility-Administered Medications   Medication Dose Route Frequency    [START ON 6/25/2021] ferrous sulfate tablet 325 mg  325 mg Oral ACB    [START ON 6/25/2021] pantoprazole (PROTONIX) tablet 40 mg  40 mg Oral DAILY    [START ON 6/25/2021] PARoxetine (PAXIL) tablet 40 mg  40 mg Oral DAILY    pregabalin (LYRICA) capsule 150 mg  150 mg Oral BID    rOPINIRole (REQUIP) tablet 0.25 mg  0.25 mg Oral TID    sodium bicarbonate tablet 650 mg  650 mg Oral TID    0.9% sodium chloride infusion  75 mL/hr IntraVENous CONTINUOUS    acetaminophen (TYLENOL) tablet 650 mg  650 mg Oral Q6H PRN    Or    acetaminophen (TYLENOL) suppository 650 mg  650 mg Rectal Q6H PRN    polyethylene glycol (MIRALAX) packet 17 g  17 g Oral DAILY PRN    ondansetron (ZOFRAN ODT) tablet 4 mg  4 mg Oral Q8H PRN    Or    ondansetron (ZOFRAN) injection 4 mg  4 mg IntraVENous Q6H PRN    heparin (porcine) injection 5,000 Units  5,000 Units SubCUTAneous Q8H    potassium chloride 10 mEq in 100 ml IVPB  10 mEq IntraVENous ONCE     Current Outpatient Medications   Medication Sig    ferrous sulfate 325 mg (65 mg iron) tablet Take 1 Tab by mouth Daily (before breakfast).  meloxicam (MOBIC) 15 mg tablet Take 1 Tab by mouth daily.  omeprazole (PriLOSEC OTC) 20 mg tablet Take 1 Tab by mouth daily.  PARoxetine (PAXIL) 40 mg tablet Take 1 Tab by mouth daily.  pregabalin (LYRICA) 150 mg capsule Take 1 Cap by mouth two (2) times a day. Max Daily Amount: 300 mg.    rOPINIRole (REQUIP) 0.25 mg tablet Take 1 Tab by mouth three (3) times daily.  potassium chloride (K-DUR, KLOR-CON) 20 mEq tablet Take 1 Tab by mouth every six (6) hours.  sodium bicarbonate 650 mg tablet Take 1 Tab by mouth three (3) times daily.  levoFLOXacin (Levaquin) 500 mg tablet Take 1 Tab by mouth daily.  methylPREDNISolone (MEDROL DOSEPACK) 4 mg tablet As Directed       REVIEW OF SYSTEMS:     Patient has complaints of chronic joint pains and neck pains, occasional lightheadedness,  she has complaints of peripheral neuropathy, taking Lyrica. She has intermittent  GI symptoms problems with pressure reflux disease, on omeprazole.   No other significant complaints on complete review of systems      Objective:   VITALS:    Visit Vitals  /71   Pulse 77   Temp 98.3 °F (36.8 °C)   Resp 18   Ht 5' (1.524 m)   Wt 83.9 kg (185 lb)   LMP  (LMP Unknown) Comment: menopause   SpO2 97%   BMI 36.13 kg/m²     Temp (24hrs), Av.3 °F (36.8 °C), Min:98.3 °F (36.8 °C), Max:98.3 °F (36.8 °C)      PHYSICAL EXAM:   General: alert awake well-oriented, no acute distress. HEENT: EOMI, no Icterus, no Pallor, pupils reactive, mucosa moist, normal inspection of ears and nose, throat clear. Neck: Neck is supple, No JVD, no thyromegaly,   Lungs: breathsounds normal, no respiratory distress on inspection, no rhonchi, no rales,  CVS: heart sounds normal, regular rate and rhythm, no murmurs, no rubs. GI: soft, nontender, normal BS, no palpable organomegaly, no renal angle tenderness. Extremeties: no clubbing, no cyanosis, no edema,  Neuro: Alert, awake, oriented x3, No new focal deficits, moving all extremeties well. Skin: normal skin turgor, no skin rashes   Musculoskeletal: no acute joint swellings    LAB DATA REVIEWED:    Recent Results (from the past 24 hour(s))   CBC WITH AUTOMATED DIFF    Collection Time: 06/24/21  2:12 PM   Result Value Ref Range    WBC 8.6 3.6 - 11.0 K/uL    RBC 5.08 3.80 - 5.20 M/uL    HGB 15.2 11.5 - 16.0 g/dL    HCT 45.1 35.0 - 47.0 %    MCV 88.8 80.0 - 99.0 FL    MCH 29.9 26.0 - 34.0 PG    MCHC 33.7 30.0 - 36.5 g/dL    RDW 14.6 (H) 11.5 - 14.5 %    PLATELET 933 (H) 406 - 400 K/uL    MPV 10.9 8.9 - 12.9 FL    NRBC 0.0 0.0  WBC    ABSOLUTE NRBC 0.00 0.00 - 0.01 K/uL    NEUTROPHILS 60 32 - 75 %    LYMPHOCYTES 29 12 - 49 %    MONOCYTES 8 5 - 13 %    EOSINOPHILS 2 0 - 7 %    BASOPHILS 1 0 - 1 %    IMMATURE GRANULOCYTES 0 0 - 0.5 %    ABS. NEUTROPHILS 5.2 1.8 - 8.0 K/UL    ABS. LYMPHOCYTES 2.5 0.8 - 3.5 K/UL    ABS. MONOCYTES 0.6 0.0 - 1.0 K/UL    ABS. EOSINOPHILS 0.1 0.0 - 0.4 K/UL    ABS. BASOPHILS 0.1 0.0 - 0.1 K/UL    ABS. IMM.  GRANS. 0.0 0.00 - 0.04 K/UL    DF AUTOMATED     METABOLIC PANEL, COMPREHENSIVE    Collection Time: 06/24/21  2:12 PM   Result Value Ref Range    Sodium 140 136 - 145 mmol/L    Potassium 2.5 (LL) 3.5 - 5.1 mmol/L    Chloride 114 (H) 97 - 108 mmol/L    CO2 14 (LL) 21 - 32 mmol/L    Anion gap 12 5 - 15 mmol/L    Glucose 134 (H) 65 - 100 mg/dL    BUN 25 (H) 6 - 20 mg/dL    Creatinine 1.80 (H) 0.55 - 1.02 mg/dL    BUN/Creatinine ratio 14 12 - 20      GFR est AA 35 (L) >60 ml/min/1.73m2    GFR est non-AA 29 (L) >60 ml/min/1.73m2    Calcium 9.5 8.5 - 10.1 mg/dL    Bilirubin, total 0.4 0.2 - 1.0 mg/dL    AST (SGOT) 7 (L) 15 - 37 U/L    ALT (SGPT) 12 12 - 78 U/L    Alk. phosphatase 76 45 - 117 U/L    Protein, total 8.4 (H) 6.4 - 8.2 g/dL    Albumin 4.0 3.5 - 5.0 g/dL    Globulin 4.4 (H) 2.0 - 4.0 g/dL    A-G Ratio 0.9 (L) 1.1 - 2.2     MAGNESIUM    Collection Time: 06/24/21  2:12 PM   Result Value Ref Range    Magnesium 2.2 1.6 - 2.4 mg/dL       Assessment plan   1 severe hypokalemia: Symptomatic with muscle weakness  Hypokalemia appears to be chronic in nature  Existence of hypokalemia along with unexplained metabolic acidosis is probably related to type I renal tubular acidosis    Recommend to check urine random electrolytes and urine potassium excretion, check plasma renin, aldosterone, cortisol levels. Type I RTA can also develop with use chronic use of NSAIDs, which were discontinued during her last visit    Recommend to give 50 mEq of IV potassium and 500 mL over 5 hours and repeat the potassium levels, will continue to repeat the doses as needed until potassium level is 3.2. Will also continue potassium chloride 20 mEq every 6 hours    2. Acute kidney injury: Probably prerenal azotemia secondary to dehydration/hypotension  Continue IV fluids and continue to monitor renal functions    2. Metabolic acidosis; nongap acidosis   no definite etiology of GI symptoms or chronic kidney disease, no history of any pulmonary disease. Suspect it is related to the renal tubular acidosis  Patient received IV sodium bicarbonate today we will continue to monitor CO2 levels, commend to avoid further sodium bicarbonate IV as it can make potassium to go lower. Will Add sodium bicarbonate p.o. for now     3. Hypotension: Patient is not on any antihypertensive medications.   Appears to be dehydrated  Continue IV fluids, and monitor blood pressures    4. Generalized weakness: Probably related to severe hypokalemia and metabolic acidosis. Will continue monitor symptoms with correction of potassium    4. Osteoarthritis/neck pains: Appears to be chronic  Continue home medications.     Thank you for the consultation    signed: Bell Arredondo MD

## 2021-06-24 NOTE — PROGRESS NOTES
6/24/21. PCP is Dr. Jung Valentin, S. D/C Plan is home with (SP) Morales Blanchard @ [phone]/[id number] & (SP) will transport home upon discharge. Declined home health/no needs.

## 2021-06-24 NOTE — PROGRESS NOTES
Reason for Admission:Weakness                       RUR Score:    N/A                 Plan for utilizing home health:   Declined/no needs. PCP: First and Last name:  Amanda Ernst.     Name of Practice:    Are you a current patient: Yes/No: Yes    Approximate date of last visit: 6/24/21   Can you participate in a virtual visit with your PCP: Yes/Call                    Current Advanced Directive/Advance Care Plan: Full Code      Healthcare Decision Maker:   Primary HCDM is (SP) Amie Storm @ [phone]. Transition of Care Plan:  D/C Plan is home with (SP) Gaetano Reji/family & he will transport home upon discharge.

## 2021-06-24 NOTE — H&P
History and Physical    NAME: Severo Hamburg   :  1960   MRN:  [id number]     Date/Time:  2021 3:45 PM    Patient PCP: Pooja Draper MD  ______________________________________________________________________             Subjective:     CHIEF COMPLAINT:   Nausea and weakness       HISTORY OF PRESENT ILLNESS:       Patient is a 64y.o. year old female with a past medical history of COPD, history of hypokalemia, GERD, RLS, hyperlipidemia, and chronic pain presents to the ED due to nausea, decreased appetite, and weakness for 1 week. Denies vomiting, chest pain, and SOB. She has had prior episodes like this when her potassium was low. She was last in the hospital in November for the same problem and was diagnosed with hypokalemia. In ER, potassium was 2.5, bicarb is 14, creatinine is 1.8. Patient admitted for further evaluation and treatment. Past Medical History:   Diagnosis Date    Chronic obstructive pulmonary disease (HCC)     Chronic pain     neck, slipped disc    Ectopic pregnancy     GERD (gastroesophageal reflux disease)     Heart murmur     History of low potassium     Hypokalemia     Ill-defined condition     murmur    Psychiatric disorder     depression        Past Surgical History:   Procedure Laterality Date    HX APPENDECTOMY      HX OTHER SURGICAL      needle removal left arm       Social History     Tobacco Use    Smoking status: Current Every Day Smoker     Packs/day: 0.25    Smokeless tobacco: Never Used   Substance Use Topics    Alcohol use: Never        History reviewed. No pertinent family history. No Known Allergies     Prior to Admission medications    Medication Sig Start Date End Date Taking? Authorizing Provider   ferrous sulfate 325 mg (65 mg iron) tablet Take 1 Tab by mouth Daily (before breakfast). 20   Venkat Jiménez MD   meloxicam (MOBIC) 15 mg tablet Take 1 Tab by mouth daily.  20   Venkat Jiménez MD   omeprazole (PriLOSEC OTC) 20 mg tablet Take 1 Tab by mouth daily. 11/8/20   Natasha Scott MD   PARoxetine (PAXIL) 40 mg tablet Take 1 Tab by mouth daily. 11/8/20   Natasha Scott MD   pregabalin (LYRICA) 150 mg capsule Take 1 Cap by mouth two (2) times a day. Max Daily Amount: 300 mg. 11/8/20   Natasha Scott MD   rOPINIRole (REQUIP) 0.25 mg tablet Take 1 Tab by mouth three (3) times daily. 11/8/20   Natasha Scott MD   potassium chloride (K-DUR, KLOR-CON) 20 mEq tablet Take 1 Tab by mouth every six (6) hours. 11/8/20   Natasha Scott MD   sodium bicarbonate 650 mg tablet Take 1 Tab by mouth three (3) times daily. 11/8/20   Natasha Scott MD   levoFLOXacin (Levaquin) 500 mg tablet Take 1 Tab by mouth daily. 11/8/20   Natasha Scott MD   methylPREDNISolone ([facility]) 4 mg tablet As Directed 11/8/20   Natasha Scott MD         Current Facility-Administered Medications:     potassium chloride (K-DUR, KLOR-CON) SR tablet 40 mEq, 40 mEq, Oral, NOW, Silvia Oswald MD    sodium chloride 0.9 % bolus infusion 1,000 mL, 1,000 mL, IntraVENous, ONCE, Silvia Oswald MD    sodium bicarbonate 8.4 % (1 mEq/mL) injection 50 mEq, 50 mEq, IntraVENous, NOW, Silvia Oswald MD    potassium chloride 10 mEq in 100 ml IVPB, 10 mEq, IntraVENous, ONCE, Silvia Oswald MD    Current Outpatient Medications:     ferrous sulfate 325 mg (65 mg iron) tablet, Take 1 Tab by mouth Daily (before breakfast). , Disp: 30 Tab, Rfl: 0    meloxicam (MOBIC) 15 mg tablet, Take 1 Tab by mouth daily. , Disp: 30 Tab, Rfl: 0    omeprazole (PriLOSEC OTC) 20 mg tablet, Take 1 Tab by mouth daily. , Disp: 30 Tab, Rfl: 0    PARoxetine (PAXIL) 40 mg tablet, Take 1 Tab by mouth daily. , Disp: 30 Tab, Rfl: 0    pregabalin (LYRICA) 150 mg capsule, Take 1 Cap by mouth two (2) times a day.  Max Daily Amount: 300 mg., Disp: 60 Cap, Rfl: 0    rOPINIRole (REQUIP) 0.25 mg tablet, Take 1 Tab by mouth three (3) times daily. , Disp: 90 Tab, Rfl: 0    potassium chloride (K-DUR, KLOR-CON) 20 mEq tablet, Take 1 Tab by mouth every six (6) hours. , Disp: 60 Tab, Rfl: 0    sodium bicarbonate 650 mg tablet, Take 1 Tab by mouth three (3) times daily. , Disp: 90 Tab, Rfl: 0    levoFLOXacin (Levaquin) 500 mg tablet, Take 1 Tab by mouth daily. , Disp: 5 Tab, Rfl: 0    methylPREDNISolone (MEDROL DOSEPACK) 4 mg tablet, As Directed, Disp: 1 Dose Pack, Rfl: 0    LAB DATA REVIEWED:    Recent Results (from the past 24 hour(s))   CBC WITH AUTOMATED DIFF    Collection Time: 06/24/21  2:12 PM   Result Value Ref Range    WBC 8.6 3.6 - 11.0 K/uL    RBC 5.08 3.80 - 5.20 M/uL    HGB 15.2 11.5 - 16.0 g/dL    HCT 45.1 35.0 - 47.0 %    MCV 88.8 80.0 - 99.0 FL    MCH 29.9 26.0 - 34.0 PG    MCHC 33.7 30.0 - 36.5 g/dL    RDW 14.6 (H) 11.5 - 14.5 %    PLATELET 135 (H) 974 - 400 K/uL    MPV 10.9 8.9 - 12.9 FL    NRBC 0.0 0.0  WBC    ABSOLUTE NRBC 0.00 0.00 - 0.01 K/uL    NEUTROPHILS 60 32 - 75 %    LYMPHOCYTES 29 12 - 49 %    MONOCYTES 8 5 - 13 %    EOSINOPHILS 2 0 - 7 %    BASOPHILS 1 0 - 1 %    IMMATURE GRANULOCYTES 0 0 - 0.5 %    ABS. NEUTROPHILS 5.2 1.8 - 8.0 K/UL    ABS. LYMPHOCYTES 2.5 0.8 - 3.5 K/UL    ABS. MONOCYTES 0.6 0.0 - 1.0 K/UL    ABS. EOSINOPHILS 0.1 0.0 - 0.4 K/UL    ABS. BASOPHILS 0.1 0.0 - 0.1 K/UL    ABS. IMM.  GRANS. 0.0 0.00 - 0.04 K/UL    DF AUTOMATED     METABOLIC PANEL, COMPREHENSIVE    Collection Time: 06/24/21  2:12 PM   Result Value Ref Range    Sodium 140 136 - 145 mmol/L    Potassium 2.5 (LL) 3.5 - 5.1 mmol/L    Chloride 114 (H) 97 - 108 mmol/L    CO2 14 (LL) 21 - 32 mmol/L    Anion gap 12 5 - 15 mmol/L    Glucose 134 (H) 65 - 100 mg/dL    BUN 25 (H) 6 - 20 mg/dL    Creatinine 1.80 (H) 0.55 - 1.02 mg/dL    BUN/Creatinine ratio 14 12 - 20      GFR est AA 35 (L) >60 ml/min/1.73m2    GFR est non-AA 29 (L) >60 ml/min/1.73m2    Calcium 9.5 8.5 - 10.1 mg/dL    Bilirubin, total 0.4 0.2 - 1.0 mg/dL AST (SGOT) 7 (L) 15 - 37 U/L    ALT (SGPT) 12 12 - 78 U/L    Alk. phosphatase 76 45 - 117 U/L    Protein, total 8.4 (H) 6.4 - 8.2 g/dL    Albumin 4.0 3.5 - 5.0 g/dL    Globulin 4.4 (H) 2.0 - 4.0 g/dL    A-G Ratio 0.9 (L) 1.1 - 2.2         XR Results (most recent):  Results from Hospital Encounter encounter on 11/04/20    XR CHEST PORT    Narrative  Portable chest, 1012 hours. Comparison with 11/4/2020. Cardiopericardial  silhouette appears within normal limits. Retrocardiac opacity may containing  gas, favoring hiatus hernia with partially intrathoracic stomach. No evidence of  pulmonary edema, air space pneumonia, or pleural effusion. No evidence of  pneumothorax. Some structures are partially obscured by overlying monitoring  electrodes. Impression  IMPRESSION: Retrocardiac mass, likely hiatus hernia with partially intrathoracic  stomach. No acute pulmonary or pleural disease. No orders to display        Review of Systems:  Constitutional: generalized weakness   HENT: Negative. Eyes: Negative. Respiratory: Negative. Cardiovascular: Negative. Gastrointestinal: nausea    Skin: Negative. Neurological: Negative. Objective:   VITALS:    Visit Vitals  /71   Pulse 77   Temp 98.3 °F (36.8 °C)   Resp 18   Ht 5' (1.524 m)   Wt 83.9 kg (185 lb)   SpO2 97%   BMI 36.13 kg/m²       Physical Exam:   Constitutional: pt is oriented to person, place, and time. HENT:   Head: Normocephalic and atraumatic. Eyes: Pupils are equal, round, and reactive to light. EOM are normal.   Cardiovascular: Normal rate, regular rhythm and normal heart sounds. Pulmonary/Chest: Breath sounds normal. No wheezes. No rales. Exhibits no tenderness. Abdominal: Soft. Bowel sounds are normal. There is no abdominal tenderness. There is no rebound and no guarding. Musculoskeletal: Normal range of motion. Neurological: pt is alert and oriented to person, place, and time. Alert. Normal strength.  No cranial nerve deficit or sensory deficit. Displays a negative Romberg sign. ASSESSMENT & PLAN:  Hypokalemia-2.5  Intractable nausea   Metabolic acidosis   Generalized weakness   COPD   History of hypokalemia   RLS  Hyperlipidemia  GERD  LOLITA       Admit to telemetry floor for cardiac monitoring  Replace potassium and recheck  Consult nephrology   Check magnesium level   2 G bicarb   Zofran for nausea   IV fluids   EKG   ABG       Current Facility-Administered Medications:     sodium chloride 0.9 % bolus infusion 1,000 mL, 1,000 mL, IntraVENous, ONCE, Geovanna Lockhart MD, Last Rate: 1,000 mL/hr at 06/24/21 1548, 1,000 mL at 06/24/21 1548    potassium chloride 10 mEq in 100 ml IVPB, 10 mEq, IntraVENous, ONCE, Geovanna Lockhart MD, Last Rate: 100 mL/hr at 06/24/21 1549, 10 mEq at 06/24/21 1549    Current Outpatient Medications:     ferrous sulfate 325 mg (65 mg iron) tablet, Take 1 Tab by mouth Daily (before breakfast). , Disp: 30 Tab, Rfl: 0    meloxicam (MOBIC) 15 mg tablet, Take 1 Tab by mouth daily. , Disp: 30 Tab, Rfl: 0    omeprazole (PriLOSEC OTC) 20 mg tablet, Take 1 Tab by mouth daily. , Disp: 30 Tab, Rfl: 0    PARoxetine (PAXIL) 40 mg tablet, Take 1 Tab by mouth daily. , Disp: 30 Tab, Rfl: 0    pregabalin (LYRICA) 150 mg capsule, Take 1 Cap by mouth two (2) times a day. Max Daily Amount: 300 mg., Disp: 60 Cap, Rfl: 0    rOPINIRole (REQUIP) 0.25 mg tablet, Take 1 Tab by mouth three (3) times daily. , Disp: 90 Tab, Rfl: 0    potassium chloride (K-DUR, KLOR-CON) 20 mEq tablet, Take 1 Tab by mouth every six (6) hours. , Disp: 60 Tab, Rfl: 0    sodium bicarbonate 650 mg tablet, Take 1 Tab by mouth three (3) times daily. , Disp: 90 Tab, Rfl: 0    levoFLOXacin (Levaquin) 500 mg tablet, Take 1 Tab by mouth daily. , Disp: 5 Tab, Rfl: 0    methylPREDNISolone (MEDROL DOSEPACK) 4 mg tablet, As Directed, Disp: 1 Dose Pack, Rfl: 0        ________________________________________________________________________    Signed: Vini Canard Juwan Leonard

## 2021-06-24 NOTE — H&P
History and Physical    NAME: Ras Alexander   :  1960   MRN:  [id number]     Date/Time:  2021 3:45 PM    Patient PCP: Pooja Draper MD  ______________________________________________________________________             Subjective:     CHIEF COMPLAINT:   Nausea and weakness       HISTORY OF PRESENT ILLNESS:       Patient is a 64y.o. year old female with a past medical history of COPD, history of hypokalemia, GERD, RLS, hyperlipidemia, and chronic pain presents to the ED due to nausea, decreased appetite, and weakness for 1 week. Denies vomiting, chest pain, and SOB. She has had prior episodes like this when her potassium was low. She was last in the hospital in November for the same problem and was diagnosed with hypokalemia. In ER, potassium was 2.5, bicarb is 14, creatinine is 1.8. Patient admitted for further evaluation and treatment. Past Medical History:   Diagnosis Date    Chronic obstructive pulmonary disease (HCC)     Chronic pain     neck, slipped disc    Ectopic pregnancy     GERD (gastroesophageal reflux disease)     Heart murmur     History of low potassium     Hypokalemia     Ill-defined condition     murmur    Psychiatric disorder     depression        Past Surgical History:   Procedure Laterality Date    HX APPENDECTOMY      HX OTHER SURGICAL      needle removal left arm       Social History     Tobacco Use    Smoking status: Current Every Day Smoker     Packs/day: 0.25    Smokeless tobacco: Never Used   Substance Use Topics    Alcohol use: Never        History reviewed. No pertinent family history. No Known Allergies     Prior to Admission medications    Medication Sig Start Date End Date Taking? Authorizing Provider   ferrous sulfate 325 mg (65 mg iron) tablet Take 1 Tab by mouth Daily (before breakfast). 20   Blanca Villagomez MD   meloxicam (MOBIC) 15 mg tablet Take 1 Tab by mouth daily.  20   Blanca Villagomez MD   omeprazole (PriLOSEC OTC) 20 mg tablet Take 1 Tab by mouth daily. 11/8/20   Herberth Prieto MD   PARoxetine (PAXIL) 40 mg tablet Take 1 Tab by mouth daily. 11/8/20   Herberth Prieto MD   pregabalin (LYRICA) 150 mg capsule Take 1 Cap by mouth two (2) times a day. Max Daily Amount: 300 mg. 11/8/20   Herberth Prieto MD   rOPINIRole (REQUIP) 0.25 mg tablet Take 1 Tab by mouth three (3) times daily. 11/8/20   Herberth Prieto MD   potassium chloride (K-DUR, KLOR-CON) 20 mEq tablet Take 1 Tab by mouth every six (6) hours. 11/8/20   Herberth Prieto MD   sodium bicarbonate 650 mg tablet Take 1 Tab by mouth three (3) times daily. 11/8/20   Herberth Prieto MD   levoFLOXacin (Levaquin) 500 mg tablet Take 1 Tab by mouth daily. 11/8/20   Herberth Prieto MD   methylPREDNISolone (MEDROL DOSEPACK) 4 mg tablet As Directed 11/8/20   Herberth Prieto MD         Current Facility-Administered Medications:     sodium chloride 0.9 % bolus infusion 1,000 mL, 1,000 mL, IntraVENous, ONCE, Lupe Castillo MD, Last Rate: 1,000 mL/hr at 06/24/21 1548, 1,000 mL at 06/24/21 1548    potassium chloride 10 mEq in 100 ml IVPB, 10 mEq, IntraVENous, ONCE, Lupe Castillo MD, Last Rate: 100 mL/hr at 06/24/21 1549, 10 mEq at 06/24/21 1549    potassium chloride 10 mEq in 50 ml IVPB, 10 mEq, IntraVENous, ONCE, Denisse Rosenbaum MD    Current Outpatient Medications:     ferrous sulfate 325 mg (65 mg iron) tablet, Take 1 Tab by mouth Daily (before breakfast). , Disp: 30 Tab, Rfl: 0    meloxicam (MOBIC) 15 mg tablet, Take 1 Tab by mouth daily. , Disp: 30 Tab, Rfl: 0    omeprazole (PriLOSEC OTC) 20 mg tablet, Take 1 Tab by mouth daily. , Disp: 30 Tab, Rfl: 0    PARoxetine (PAXIL) 40 mg tablet, Take 1 Tab by mouth daily. , Disp: 30 Tab, Rfl: 0    pregabalin (LYRICA) 150 mg capsule, Take 1 Cap by mouth two (2) times a day.  Max Daily Amount: 300 mg., Disp: 60 Cap, Rfl: 0   rOPINIRole (REQUIP) 0.25 mg tablet, Take 1 Tab by mouth three (3) times daily. , Disp: 90 Tab, Rfl: 0    potassium chloride (K-DUR, KLOR-CON) 20 mEq tablet, Take 1 Tab by mouth every six (6) hours. , Disp: 60 Tab, Rfl: 0    sodium bicarbonate 650 mg tablet, Take 1 Tab by mouth three (3) times daily. , Disp: 90 Tab, Rfl: 0    levoFLOXacin (Levaquin) 500 mg tablet, Take 1 Tab by mouth daily. , Disp: 5 Tab, Rfl: 0    methylPREDNISolone (MEDROL DOSEPACK) 4 mg tablet, As Directed, Disp: 1 Dose Pack, Rfl: 0    LAB DATA REVIEWED:    Recent Results (from the past 24 hour(s))   CBC WITH AUTOMATED DIFF    Collection Time: 06/24/21  2:12 PM   Result Value Ref Range    WBC 8.6 3.6 - 11.0 K/uL    RBC 5.08 3.80 - 5.20 M/uL    HGB 15.2 11.5 - 16.0 g/dL    HCT 45.1 35.0 - 47.0 %    MCV 88.8 80.0 - 99.0 FL    MCH 29.9 26.0 - 34.0 PG    MCHC 33.7 30.0 - 36.5 g/dL    RDW 14.6 (H) 11.5 - 14.5 %    PLATELET 579 (H) 962 - 400 K/uL    MPV 10.9 8.9 - 12.9 FL    NRBC 0.0 0.0  WBC    ABSOLUTE NRBC 0.00 0.00 - 0.01 K/uL    NEUTROPHILS 60 32 - 75 %    LYMPHOCYTES 29 12 - 49 %    MONOCYTES 8 5 - 13 %    EOSINOPHILS 2 0 - 7 %    BASOPHILS 1 0 - 1 %    IMMATURE GRANULOCYTES 0 0 - 0.5 %    ABS. NEUTROPHILS 5.2 1.8 - 8.0 K/UL    ABS. LYMPHOCYTES 2.5 0.8 - 3.5 K/UL    ABS. MONOCYTES 0.6 0.0 - 1.0 K/UL    ABS. EOSINOPHILS 0.1 0.0 - 0.4 K/UL    ABS. BASOPHILS 0.1 0.0 - 0.1 K/UL    ABS. IMM.  GRANS. 0.0 0.00 - 0.04 K/UL    DF AUTOMATED     METABOLIC PANEL, COMPREHENSIVE    Collection Time: 06/24/21  2:12 PM   Result Value Ref Range    Sodium 140 136 - 145 mmol/L    Potassium 2.5 (LL) 3.5 - 5.1 mmol/L    Chloride 114 (H) 97 - 108 mmol/L    CO2 14 (LL) 21 - 32 mmol/L    Anion gap 12 5 - 15 mmol/L    Glucose 134 (H) 65 - 100 mg/dL    BUN 25 (H) 6 - 20 mg/dL    Creatinine 1.80 (H) 0.55 - 1.02 mg/dL    BUN/Creatinine ratio 14 12 - 20      GFR est AA 35 (L) >60 ml/min/1.73m2    GFR est non-AA 29 (L) >60 ml/min/1.73m2    Calcium 9.5 8.5 - 10.1 mg/dL Bilirubin, total 0.4 0.2 - 1.0 mg/dL    AST (SGOT) 7 (L) 15 - 37 U/L    ALT (SGPT) 12 12 - 78 U/L    Alk. phosphatase 76 45 - 117 U/L    Protein, total 8.4 (H) 6.4 - 8.2 g/dL    Albumin 4.0 3.5 - 5.0 g/dL    Globulin 4.4 (H) 2.0 - 4.0 g/dL    A-G Ratio 0.9 (L) 1.1 - 2.2         XR Results (most recent):  Results from Hospital Encounter encounter on 11/04/20    XR CHEST PORT    Narrative  Portable chest, 1012 hours. Comparison with 11/4/2020. Cardiopericardial  silhouette appears within normal limits. Retrocardiac opacity may containing  gas, favoring hiatus hernia with partially intrathoracic stomach. No evidence of  pulmonary edema, air space pneumonia, or pleural effusion. No evidence of  pneumothorax. Some structures are partially obscured by overlying monitoring  electrodes. Impression  IMPRESSION: Retrocardiac mass, likely hiatus hernia with partially intrathoracic  stomach. No acute pulmonary or pleural disease. No orders to display        Review of Systems:  Constitutional: generalized weakness   HENT: Negative. Eyes: Negative. Respiratory: Negative. Cardiovascular: Negative. Gastrointestinal: nausea    Skin: Negative. Neurological: Negative. Objective:   VITALS:    Visit Vitals  /71   Pulse 77   Temp 98.3 °F (36.8 °C)   Resp 18   Ht 5' (1.524 m)   Wt 83.9 kg (185 lb)   SpO2 97%   BMI 36.13 kg/m²       Physical Exam:   Constitutional: pt is oriented to person, place, and time. HENT:   Head: Normocephalic and atraumatic. Eyes: Pupils are equal, round, and reactive to light. EOM are normal.   Cardiovascular: Normal rate, regular rhythm and normal heart sounds. Pulmonary/Chest: Breath sounds normal. No wheezes. No rales. Exhibits no tenderness. Abdominal: Soft. Bowel sounds are normal. There is no abdominal tenderness. There is no rebound and no guarding. Musculoskeletal: Normal range of motion. Neurological: pt is alert and oriented to person, place, and time. Alert. Normal strength. No cranial nerve deficit or sensory deficit. Displays a negative Romberg sign. ASSESSMENT & PLAN:  Hypokalemia-2.5  Intractable nausea   Metabolic acidosis   Acute kidney injury  Generalized weakness   COPD   History of hypokalemia   RLS  Hyperlipidemia  GERD        Admit to telemetry floor for cardiac monitoring  Replace potassium and recheck  Consult nephrology   Check magnesium level   2 G bicarb   Zofran for nausea   IV fluids   EKG   ABG       Current Facility-Administered Medications:     [START ON 6/25/2021] ferrous sulfate tablet 325 mg, 325 mg, Oral, ACB, Amada Rosenbaum MD  [facility]  [START ON 6/25/2021] omeprazole (PRILOSEC) capsule 40 mg, 40 mg, Oral, DAILY, Amada Rosenbaum MD  [facility]  [START ON 6/25/2021] PARoxetine (PAXIL) tablet 40 mg, 40 mg, Oral, DAILY, Amada Rosenbaum MD    pregabalin (LYRICA) capsule 150 mg, 150 mg, Oral, BID, Amada Rosenbaum MD    rOPINIRole (REQUIP) tablet 0.25 mg, 0.25 mg, Oral, TID, Amada Rosenbaum MD    sodium bicarbonate tablet 650 mg, 650 mg, Oral, TID, Amada Rosenbaum MD    0.9% sodium chloride infusion, 75 mL/hr, IntraVENous, CONTINUOUS, Amada Rosenbaum MD    acetaminophen (TYLENOL) tablet 650 mg, 650 mg, Oral, Q6H PRN **OR** acetaminophen (TYLENOL) suppository 650 mg, 650 mg, Rectal, Q6H PRN, Amada Rosenbaum MD    polyethylene glycol (MIRALAX) packet 17 g, 17 g, Oral, DAILY PRN, Amada Rosenbaum MD    ondansetron (ZOFRAN ODT) tablet 4 mg, 4 mg, Oral, Q8H PRN **OR** ondansetron (ZOFRAN) injection 4 mg, 4 mg, IntraVENous, Q6H PRN, Denisse Rosenbaum MD    heparin (porcine) injection 5,000 Units, 5,000 Units, SubCUTAneous, Q8H, Denisse Rosenbaum MD    potassium chloride 10 mEq in 100 ml IVPB, 10 mEq, IntraVENous, ONCE, Denisse Rosenbaum MD    Current Outpatient Medications:     ferrous sulfate 325 mg (65 mg iron) tablet, Take 1 Tab by mouth Daily (before breakfast). , Disp: 30 Tab, Rfl: 0    meloxicam (MOBIC) 15 mg tablet, Take 1 Tab by mouth daily. , Disp: 30 Tab, Rfl: 0    omeprazole (PriLOSEC OTC) 20 mg tablet, Take 1 Tab by mouth daily. , Disp: 30 Tab, Rfl: 0    PARoxetine (PAXIL) 40 mg tablet, Take 1 Tab by mouth daily. , Disp: 30 Tab, Rfl: 0    pregabalin (LYRICA) 150 mg capsule, Take 1 Cap by mouth two (2) times a day. Max Daily Amount: 300 mg., Disp: 60 Cap, Rfl: 0    rOPINIRole (REQUIP) 0.25 mg tablet, Take 1 Tab by mouth three (3) times daily. , Disp: 90 Tab, Rfl: 0    potassium chloride (K-DUR, KLOR-CON) 20 mEq tablet, Take 1 Tab by mouth every six (6) hours. , Disp: 60 Tab, Rfl: 0    sodium bicarbonate 650 mg tablet, Take 1 Tab by mouth three (3) times daily. , Disp: 90 Tab, Rfl: 0    levoFLOXacin (Levaquin) 500 mg tablet, Take 1 Tab by mouth daily. , Disp: 5 Tab, Rfl: 0    methylPREDNISolone (MEDROL DOSEPACK) 4 mg tablet, As Directed, Disp: 1 Dose Pack, Rfl: 0          ________________________________________________________________________    Signed: Karri Elizondo MD

## 2021-06-24 NOTE — ED PROVIDER NOTES
EMERGENCY DEPARTMENT HISTORY AND PHYSICAL EXAM      Date: 6/24/2021  Patient Name: Betty Lopez    History of Presenting Illness     Chief Complaint   Patient presents with    Nausea       History Provided By: Patient    HPI: Betty Lopez, 64 y.o. female presents to the ED with cc of   Chief Complaint   Patient presents with    Nausea          Nausea for over a week and poor appetite. Pt stated this usually happens when her potassium drops. Patient feels weak with poor appetite positive nausea no vomiting no abdominal pain denies any chest pain shortness of breath fever or headache   Moderate severity, no known exacerbating or relieving factors, no other associated signs and symptoms. There are no other complaints, changes, or physical findings at this time. PCP: Other, MD Pooja    No current facility-administered medications on file prior to encounter. Current Outpatient Medications on File Prior to Encounter   Medication Sig Dispense Refill    ferrous sulfate 325 mg (65 mg iron) tablet Take 1 Tab by mouth Daily (before breakfast). 30 Tab 0    meloxicam (MOBIC) 15 mg tablet Take 1 Tab by mouth daily. 30 Tab 0    omeprazole (PriLOSEC OTC) 20 mg tablet Take 1 Tab by mouth daily. 30 Tab 0    PARoxetine (PAXIL) 40 mg tablet Take 1 Tab by mouth daily. 30 Tab 0    pregabalin (LYRICA) 150 mg capsule Take 1 Cap by mouth two (2) times a day. Max Daily Amount: 300 mg. 60 Cap 0    rOPINIRole (REQUIP) 0.25 mg tablet Take 1 Tab by mouth three (3) times daily. 90 Tab 0    potassium chloride (K-DUR, KLOR-CON) 20 mEq tablet Take 1 Tab by mouth every six (6) hours. 60 Tab 0    sodium bicarbonate 650 mg tablet Take 1 Tab by mouth three (3) times daily. 90 Tab 0    levoFLOXacin (Levaquin) 500 mg tablet Take 1 Tab by mouth daily.  5 Tab 0    methylPREDNISolone (MEDROL DOSEPACK) 4 mg tablet As Directed 1 Dose Pack 0       Past History     Past Medical History:  Past Medical History:   Diagnosis Date    Chronic obstructive pulmonary disease (HCC)     Chronic pain     neck, slipped disc    Ectopic pregnancy     GERD (gastroesophageal reflux disease)     Heart murmur     History of low potassium     Hypokalemia     Ill-defined condition     murmur    Psychiatric disorder     depression       Past Surgical History:  Past Surgical History:   Procedure Laterality Date    HX APPENDECTOMY      HX OTHER SURGICAL      needle removal left arm       Family History:  History reviewed. No pertinent family history. Social History:  Social History     Tobacco Use    Smoking status: Current Every Day Smoker     Packs/day: 0.25    Smokeless tobacco: Never Used   Substance Use Topics    Alcohol use: Never    Drug use: Never       Allergies:  No Known Allergies      Review of Systems   Review of Systems   Constitutional: Positive for fatigue. HENT: Negative for congestion, facial swelling, rhinorrhea and sore throat. Eyes: Negative. Negative for photophobia and pain. Respiratory: Negative for cough, shortness of breath and wheezing. Cardiovascular: Negative. Negative for chest pain. Gastrointestinal: Positive for nausea. Negative for abdominal distention and abdominal pain. Genitourinary: Negative. Musculoskeletal: Negative. Allergic/Immunologic: Negative for immunocompromised state. Neurological: Negative. Negative for syncope and weakness. Hematological: Negative. Psychiatric/Behavioral: Negative. Physical Exam   Physical Exam  Vitals and nursing note reviewed. Constitutional:       Appearance: Normal appearance. She is normal weight. HENT:      Head: Normocephalic and atraumatic. Nose: No congestion or rhinorrhea. Eyes:      Extraocular Movements: Extraocular movements intact. Pupils: Pupils are equal, round, and reactive to light. Cardiovascular:      Rate and Rhythm: Normal rate and regular rhythm.    Pulmonary:      Effort: Pulmonary effort is normal.      Breath sounds: Normal breath sounds. Abdominal:      General: Abdomen is flat. Bowel sounds are normal. There is no distension. Tenderness: There is no abdominal tenderness. There is no guarding. Musculoskeletal:         General: Normal range of motion. Cervical back: Normal range of motion and neck supple. Skin:     General: Skin is warm and dry. Neurological:      General: No focal deficit present. Mental Status: She is alert and oriented to person, place, and time. Psychiatric:         Mood and Affect: Mood normal.         Diagnostic Study Results     Labs -     Recent Results (from the past 12 hour(s))   CBC WITH AUTOMATED DIFF    Collection Time: 06/24/21  2:12 PM   Result Value Ref Range    WBC 8.6 3.6 - 11.0 K/uL    RBC 5.08 3.80 - 5.20 M/uL    HGB 15.2 11.5 - 16.0 g/dL    HCT 45.1 35.0 - 47.0 %    MCV 88.8 80.0 - 99.0 FL    MCH 29.9 26.0 - 34.0 PG    MCHC 33.7 30.0 - 36.5 g/dL    RDW 14.6 (H) 11.5 - 14.5 %    PLATELET 927 (H) 584 - 400 K/uL    MPV 10.9 8.9 - 12.9 FL    NRBC 0.0 0.0  WBC    ABSOLUTE NRBC 0.00 0.00 - 0.01 K/uL    NEUTROPHILS 60 32 - 75 %    LYMPHOCYTES 29 12 - 49 %    MONOCYTES 8 5 - 13 %    EOSINOPHILS 2 0 - 7 %    BASOPHILS 1 0 - 1 %    IMMATURE GRANULOCYTES 0 0 - 0.5 %    ABS. NEUTROPHILS 5.2 1.8 - 8.0 K/UL    ABS. LYMPHOCYTES 2.5 0.8 - 3.5 K/UL    ABS. MONOCYTES 0.6 0.0 - 1.0 K/UL    ABS. EOSINOPHILS 0.1 0.0 - 0.4 K/UL    ABS. BASOPHILS 0.1 0.0 - 0.1 K/UL    ABS. IMM.  GRANS. 0.0 0.00 - 0.04 K/UL    DF AUTOMATED     METABOLIC PANEL, COMPREHENSIVE    Collection Time: 06/24/21  2:12 PM   Result Value Ref Range    Sodium 140 136 - 145 mmol/L    Potassium 2.5 (LL) 3.5 - 5.1 mmol/L    Chloride 114 (H) 97 - 108 mmol/L    CO2 14 (LL) 21 - 32 mmol/L    Anion gap 12 5 - 15 mmol/L    Glucose 134 (H) 65 - 100 mg/dL    BUN 25 (H) 6 - 20 mg/dL    Creatinine 1.80 (H) 0.55 - 1.02 mg/dL    BUN/Creatinine ratio 14 12 - 20      GFR est AA 35 (L) >60 ml/min/1.73m2 GFR est non-AA 29 (L) >60 ml/min/1.73m2    Calcium 9.5 8.5 - 10.1 mg/dL    Bilirubin, total 0.4 0.2 - 1.0 mg/dL    AST (SGOT) 7 (L) 15 - 37 U/L    ALT (SGPT) 12 12 - 78 U/L    Alk. phosphatase 76 45 - 117 U/L    Protein, total 8.4 (H) 6.4 - 8.2 g/dL    Albumin 4.0 3.5 - 5.0 g/dL    Globulin 4.4 (H) 2.0 - 4.0 g/dL    A-G Ratio 0.9 (L) 1.1 - 2.2         Labs reviewed by me    Radiologic Studies -   No orders to display     CT Results  (Last 48 hours)    None        CXR Results  (Last 48 hours)    None            Medical Decision Making     I am the first provider for this patient. I reviewed the vital signs, available nursing notes, past medical history, past surgical history, family history and social history. RADIOLOGY report and LABS reviewed by me    Vital Signs-Reviewed the patient's vital signs. Patient Vitals for the past 12 hrs:   Temp Pulse Resp BP SpO2   06/24/21 1359 98.3 °F (36.8 °C) 77 18 101/71 97 %       EKG interpretation: (Preliminary)      Records Reviewed: Nurse's note. Provider Notes (Medical Decision Making):    Patient presents with DIFF DX : Renal failure, hypokalemia        ED Course:   Initial assessment performed. The patients presenting problems have been discussed, and they are in agreement with the care plan formulated and outlined with them. I have encouraged them to ask questions as they arise throughout their visit. CRITICAL CARE NOTE :  3:20 PM  Amount of Critical Care Time: _45___(minutes)__    IMPENDING DETERIORATION -Metabolic  ASSOCIATED RISK FACTORS - Metabolic changes  MANAGEMENT- Bedside Assessment  INTERPRETATION -  Xrays and Blood Pressure  INTERVENTIONS - Metobolic interventions  CASE REVIEW - Hospitalist/Intensivist  TREATMENT RESPONSE -Improved  PERFORMED BY - Self    NOTES   :  I have spent critical care time involved in lab review, consultations with specialist, family decision- making, bedside attention and documentation.  This time excludes time spent in any separate billed procedures. During this entire length of time I was immediately available to the patient . Jayro Adams MD                  [address] [place] [address], MD      Disposition:  Admitted   Diagnostic tests were reviewed and questions answered. Diagnosis, care plan and treatment options were discussed. The patient understand instructions and will follow up as directed. Condition stable    Admitting Provider:  No admitting provider for patient encounter. Consulting Provider:  No ref. provider found       DISCHARGE PLAN:  1. Current Discharge Medication List        2. Follow-up Information    None       3. Return to ED if worse     Diagnosis     Clinical Impression:     ICD-10-CM ICD-9-CM    1. Weakness  R53.1 780.79    2. Hypokalemia  E87.6 276.8    3. Metabolic acidosis  R53.5 305.8    4. Acute renal failure, unspecified acute renal failure type [facility])  N17.9 584.9         Attestations:    Jayro Adams MD    Please note that this dictation was completed with [employer], the computer voice recognition software. Quite often unanticipated grammatical, syntax, homophones, and other interpretive errors are inadvertently transcribed by the computer software. Please disregard these errors. Please excuse any errors that have escaped final proofreading. Thank you.

## 2021-06-24 NOTE — ROUTINE PROCESS
TRANSFER - OUT REPORT:    Verbal report given to Ryan Paris RN (name) on [facility]  being transferred to [facility] (unit) for routine progression of care       Report consisted of patients Situation, Background, Assessment and   Recommendations(SBAR). Information from the following report(s) SBAR, Kardex, ED Summary, [facility] and Recent Results was reviewed with the receiving nurse. Lines:   Peripheral IV 06/24/21 Anterior;Right Forearm (Active)        Opportunity for questions and clarification was provided.       Patient transported with:   Monitor  Tech, personal belongings, pt chart

## 2021-06-25 LAB
ALBUMIN SERPL-MCNC: 3.2 G/DL (ref 3.5–5)
ALBUMIN SERPL-MCNC: 3.3 G/DL (ref 3.5–5)
ALBUMIN/GLOB SERPL: 1 {RATIO} (ref 1.1–2.2)
ALP SERPL-CCNC: 57 U/L (ref 45–117)
ALT SERPL-CCNC: 12 U/L (ref 12–78)
AMPHET UR QL SCN: NEGATIVE
ANION GAP SERPL CALC-SCNC: 8 MMOL/L (ref 5–15)
ANION GAP SERPL CALC-SCNC: 9 MMOL/L (ref 5–15)
APPEARANCE UR: CLEAR
AST SERPL W P-5'-P-CCNC: 9 U/L (ref 15–37)
ATRIAL RATE: 57 BPM
BACTERIA URNS QL MICRO: NEGATIVE /HPF
BARBITURATES UR QL SCN: NEGATIVE
BASOPHILS # BLD: 0.1 K/UL (ref 0–0.1)
BASOPHILS NFR BLD: 1 % (ref 0–1)
BENZODIAZ UR QL: NEGATIVE
BILIRUB SERPL-MCNC: 0.4 MG/DL (ref 0.2–1)
BILIRUB UR QL: NEGATIVE
BUN SERPL-MCNC: 23 MG/DL (ref 6–20)
BUN SERPL-MCNC: 23 MG/DL (ref 6–20)
BUN/CREAT SERPL: 18 (ref 12–20)
BUN/CREAT SERPL: 18 (ref 12–20)
CA-I BLD-MCNC: 8.7 MG/DL (ref 8.5–10.1)
CA-I BLD-MCNC: 8.8 MG/DL (ref 8.5–10.1)
CALCULATED P AXIS, ECG09: 46 DEGREES
CALCULATED R AXIS, ECG10: 26 DEGREES
CALCULATED T AXIS, ECG11: 74 DEGREES
CANNABINOIDS UR QL SCN: NEGATIVE
CHLORIDE SERPL-SCNC: 118 MMOL/L (ref 97–108)
CHLORIDE SERPL-SCNC: 119 MMOL/L (ref 97–108)
CHLORIDE UR-SCNC: 43 MMOL/L
CK SERPL-CCNC: 80 U/L (ref 26–192)
CO2 SERPL-SCNC: 15 MMOL/L (ref 21–32)
CO2 SERPL-SCNC: 15 MMOL/L (ref 21–32)
COCAINE UR QL SCN: NEGATIVE
COLOR UR: ABNORMAL
CORTIS AM PEAK SERPL-MCNC: 5.3 UG/DL (ref 4.3–22.45)
CREAT SERPL-MCNC: 1.29 MG/DL (ref 0.55–1.02)
CREAT SERPL-MCNC: 1.3 MG/DL (ref 0.55–1.02)
CREAT UR-MCNC: 202 MG/DL
DIAGNOSIS, 93000: NORMAL
DIFFERENTIAL METHOD BLD: ABNORMAL
DRUG SCRN COMMENT,DRGCM: NORMAL
EOSINOPHIL # BLD: 0.2 K/UL (ref 0–0.4)
EOSINOPHIL NFR BLD: 3 % (ref 0–7)
ERYTHROCYTE [DISTWIDTH] IN BLOOD BY AUTOMATED COUNT: 14.7 % (ref 11.5–14.5)
GLOBULIN SER CALC-MCNC: 3.3 G/DL (ref 2–4)
GLUCOSE SERPL-MCNC: 93 MG/DL (ref 65–100)
GLUCOSE SERPL-MCNC: 94 MG/DL (ref 65–100)
GLUCOSE UR STRIP.AUTO-MCNC: NEGATIVE MG/DL
HCT VFR BLD AUTO: 34.9 % (ref 35–47)
HGB BLD-MCNC: 11.6 G/DL (ref 11.5–16)
HGB UR QL STRIP: NEGATIVE
IMM GRANULOCYTES # BLD AUTO: 0 K/UL (ref 0–0.04)
IMM GRANULOCYTES NFR BLD AUTO: 0 % (ref 0–0.5)
KETONES UR QL STRIP.AUTO: NEGATIVE MG/DL
LEUKOCYTE ESTERASE UR QL STRIP.AUTO: ABNORMAL
LYMPHOCYTES # BLD: 3.2 K/UL (ref 0.8–3.5)
LYMPHOCYTES NFR BLD: 37 % (ref 12–49)
MCH RBC QN AUTO: 29.8 PG (ref 26–34)
MCHC RBC AUTO-ENTMCNC: 33.2 G/DL (ref 30–36.5)
MCV RBC AUTO: 89.7 FL (ref 80–99)
METHADONE UR QL: NEGATIVE
MONOCYTES # BLD: 0.9 K/UL (ref 0–1)
MONOCYTES NFR BLD: 11 % (ref 5–13)
NEUTS SEG # BLD: 4.1 K/UL (ref 1.8–8)
NEUTS SEG NFR BLD: 48 % (ref 32–75)
NITRITE UR QL STRIP.AUTO: NEGATIVE
NRBC # BLD: 0 K/UL (ref 0–0.01)
NRBC BLD-RTO: 0 PER 100 WBC
OPIATES UR QL: NEGATIVE
P-R INTERVAL, ECG05: 164 MS
PCP UR QL: NEGATIVE
PH UR STRIP: 6 [PH] (ref 5–8)
PHOSPHATE SERPL-MCNC: 2.3 MG/DL (ref 2.6–4.7)
PLATELET # BLD AUTO: 318 K/UL (ref 150–400)
PMV BLD AUTO: 11.7 FL (ref 8.9–12.9)
POTASSIUM SERPL-SCNC: 3.7 MMOL/L (ref 3.5–5.1)
POTASSIUM SERPL-SCNC: 3.7 MMOL/L (ref 3.5–5.1)
POTASSIUM UR-SCNC: 82 MMOL/L
PROT SERPL-MCNC: 6.6 G/DL (ref 6.4–8.2)
PROT UR STRIP-MCNC: 30 MG/DL
PROT UR-MCNC: 58 MG/DL (ref 0–11.9)
PROT/CREAT UR-RTO: 0.3
Q-T INTERVAL, ECG07: 384 MS
QRS DURATION, ECG06: 74 MS
QTC CALCULATION (BEZET), ECG08: 373 MS
RBC # BLD AUTO: 3.89 M/UL (ref 3.8–5.2)
RBC #/AREA URNS HPF: ABNORMAL /HPF (ref 0–5)
SODIUM SERPL-SCNC: 142 MMOL/L (ref 136–145)
SODIUM SERPL-SCNC: 142 MMOL/L (ref 136–145)
SODIUM UR-SCNC: 6 MMOL/L
SP GR UR REFRACTOMETRY: 1.02 (ref 1–1.03)
UROBILINOGEN UR QL STRIP.AUTO: 0.1 EU/DL (ref 0.1–1)
VENTRICULAR RATE, ECG03: 57 BPM
WBC # BLD AUTO: 8.5 K/UL (ref 3.6–11)
WBC URNS QL MICRO: ABNORMAL /HPF (ref 0–4)

## 2021-06-25 PROCEDURE — 82570 ASSAY OF URINE CREATININE: CPT

## 2021-06-25 PROCEDURE — 82533 TOTAL CORTISOL: CPT

## 2021-06-25 PROCEDURE — 74011000250 HC RX REV CODE- 250: Performed by: INTERNAL MEDICINE

## 2021-06-25 PROCEDURE — 74011250637 HC RX REV CODE- 250/637: Performed by: INTERNAL MEDICINE

## 2021-06-25 PROCEDURE — 84300 ASSAY OF URINE SODIUM: CPT

## 2021-06-25 PROCEDURE — 80307 DRUG TEST PRSMV CHEM ANLYZR: CPT

## 2021-06-25 PROCEDURE — 80069 RENAL FUNCTION PANEL: CPT

## 2021-06-25 PROCEDURE — 74011250636 HC RX REV CODE- 250/636: Performed by: INTERNAL MEDICINE

## 2021-06-25 PROCEDURE — 84133 ASSAY OF URINE POTASSIUM: CPT

## 2021-06-25 PROCEDURE — 82088 ASSAY OF ALDOSTERONE: CPT

## 2021-06-25 PROCEDURE — 82436 ASSAY OF URINE CHLORIDE: CPT

## 2021-06-25 PROCEDURE — 80053 COMPREHEN METABOLIC PANEL: CPT

## 2021-06-25 PROCEDURE — 36415 COLL VENOUS BLD VENIPUNCTURE: CPT

## 2021-06-25 PROCEDURE — 85025 COMPLETE CBC W/AUTO DIFF WBC: CPT

## 2021-06-25 PROCEDURE — 82550 ASSAY OF CK (CPK): CPT

## 2021-06-25 PROCEDURE — 74011250636 HC RX REV CODE- 250/636: Performed by: FAMILY MEDICINE

## 2021-06-25 PROCEDURE — 74011250637 HC RX REV CODE- 250/637: Performed by: FAMILY MEDICINE

## 2021-06-25 PROCEDURE — 65270000029 HC RM PRIVATE

## 2021-06-25 RX ORDER — SODIUM CHLORIDE 9 MG/ML
75 INJECTION, SOLUTION INTRAVENOUS CONTINUOUS
Status: DISCONTINUED | OUTPATIENT
Start: 2021-06-25 | End: 2021-06-25

## 2021-06-25 RX ORDER — POTASSIUM CITRATE 5 MEQ/1
15 TABLET, EXTENDED RELEASE ORAL 3 TIMES DAILY
Status: DISCONTINUED | OUTPATIENT
Start: 2021-06-25 | End: 2021-06-26 | Stop reason: HOSPADM

## 2021-06-25 RX ORDER — CALCIUM CARBONATE 200(500)MG
400 TABLET,CHEWABLE ORAL
Status: DISCONTINUED | OUTPATIENT
Start: 2021-06-25 | End: 2021-06-26 | Stop reason: HOSPADM

## 2021-06-25 RX ORDER — SPIRONOLACTONE 25 MG/1
12.5 TABLET ORAL 2 TIMES DAILY
Status: DISCONTINUED | OUTPATIENT
Start: 2021-06-25 | End: 2021-06-26 | Stop reason: HOSPADM

## 2021-06-25 RX ADMIN — FERROUS SULFATE TAB 325 MG (65 MG ELEMENTAL FE) 325 MG: 325 (65 FE) TAB at 05:51

## 2021-06-25 RX ADMIN — SODIUM BICARBONATE: 84 INJECTION, SOLUTION INTRAVENOUS at 05:51

## 2021-06-25 RX ADMIN — SODIUM CHLORIDE 75 ML/HR: 9 INJECTION, SOLUTION INTRAVENOUS at 01:01

## 2021-06-25 RX ADMIN — SPIRONOLACTONE 12.5 MG: 25 TABLET ORAL at 11:51

## 2021-06-25 RX ADMIN — CALCIUM CARBONATE 400 MG: 500 TABLET, CHEWABLE ORAL at 01:01

## 2021-06-25 RX ADMIN — ROPINIROLE HYDROCHLORIDE 0.25 MG: 0.25 TABLET, FILM COATED ORAL at 17:14

## 2021-06-25 RX ADMIN — HEPARIN SODIUM 5000 UNITS: 5000 INJECTION INTRAVENOUS; SUBCUTANEOUS at 05:25

## 2021-06-25 RX ADMIN — ROPINIROLE HYDROCHLORIDE 0.25 MG: 0.25 TABLET, FILM COATED ORAL at 10:33

## 2021-06-25 RX ADMIN — SODIUM BICARBONATE: 84 INJECTION, SOLUTION INTRAVENOUS at 22:22

## 2021-06-25 RX ADMIN — ACETAMINOPHEN 650 MG: 325 TABLET ORAL at 14:09

## 2021-06-25 RX ADMIN — POTASSIUM CHLORIDE 40 MEQ: 1500 TABLET, EXTENDED RELEASE ORAL at 01:01

## 2021-06-25 RX ADMIN — SPIRONOLACTONE 12.5 MG: 25 TABLET ORAL at 22:22

## 2021-06-25 RX ADMIN — POTASSIUM CITRATE 15 MEQ: 5 TABLET ORAL at 17:14

## 2021-06-25 RX ADMIN — ROPINIROLE HYDROCHLORIDE 0.25 MG: 0.25 TABLET, FILM COATED ORAL at 22:22

## 2021-06-25 RX ADMIN — SODIUM CHLORIDE 250 ML: 9 INJECTION, SOLUTION INTRAVENOUS at 14:33

## 2021-06-25 RX ADMIN — HEPARIN SODIUM 5000 UNITS: 5000 INJECTION INTRAVENOUS; SUBCUTANEOUS at 22:22

## 2021-06-25 RX ADMIN — PAROXETINE HYDROCHLORIDE 40 MG: 10 TABLET, FILM COATED ORAL at 10:32

## 2021-06-25 RX ADMIN — HEPARIN SODIUM 5000 UNITS: 5000 INJECTION INTRAVENOUS; SUBCUTANEOUS at 14:33

## 2021-06-25 RX ADMIN — POTASSIUM CITRATE 15 MEQ: 5 TABLET ORAL at 11:51

## 2021-06-25 RX ADMIN — POTASSIUM CITRATE 15 MEQ: 5 TABLET ORAL at 22:22

## 2021-06-25 RX ADMIN — PREGABALIN 150 MG: 100 CAPSULE ORAL at 10:32

## 2021-06-25 RX ADMIN — PREGABALIN 150 MG: 100 CAPSULE ORAL at 20:15

## 2021-06-25 RX ADMIN — ACETAMINOPHEN 650 MG: 325 TABLET ORAL at 19:35

## 2021-06-25 NOTE — PROGRESS NOTES
Renal Progress Note    Patient: Betty Lopez MRN: [id number]  SSN: [id number]    YOB: 1960  Age: 64 y.o. Sex: female      Admit Date: 6/24/2021    LOS: 1 day     Subjective:   Patient seen at bedside. Alert and awake, no acute distress. Patient is feeling better, not giving any new complaints today. No complaints of any swelling in lower extremities. No complaints of shortness of breath. Improved potassium to 3.7 today, improved creatinine to 1.29      Current Facility-Administered Medications   Medication Dose Route Frequency    calcium carbonate (TUMS) chewable tablet 400 mg [elemental]  400 mg Oral Q4H PRN    sodium bicarbonate (8.4%) 150 mEq in dextrose 5% 1,000 mL infusion   IntraVENous CONTINUOUS    spironolactone (ALDACTONE) tablet 12.5 mg  12.5 mg Oral BID    potassium citrate (UROCIT-K5) tablet 15 mEq  15 mEq Oral TID    ferrous sulfate tablet 325 mg  325 mg Oral ACB    PARoxetine (PAXIL) tablet 40 mg  40 mg Oral DAILY    pregabalin (LYRICA) capsule 150 mg  150 mg Oral BID    rOPINIRole (REQUIP) tablet 0.25 mg  0.25 mg Oral TID    acetaminophen (TYLENOL) tablet 650 mg  650 mg Oral Q6H PRN    Or    acetaminophen (TYLENOL) suppository 650 mg  650 mg Rectal Q6H PRN    polyethylene glycol (MIRALAX) packet 17 g  17 g Oral DAILY PRN    ondansetron (ZOFRAN ODT) tablet 4 mg  4 mg Oral Q8H PRN    Or    ondansetron (ZOFRAN) injection 4 mg  4 mg IntraVENous Q6H PRN    heparin (porcine) injection 5,000 Units  5,000 Units SubCUTAneous Q8H    pantoprazole (PROTONIX) tablet 40 mg  40 mg Oral DAILY        Vitals:    06/25/21 0204 06/25/21 0822 06/25/21 1415 06/25/21 1717   BP: 126/70 115/66 (!) 80/58 126/70   Pulse: 71 71 80 85   Resp: 17 18 18    Temp: 98.1 °F (36.7 °C) 98.2 °F (36.8 °C) 98.2 °F (36.8 °C)    SpO2: 97% 97% 98% 96%   Weight:       Height:         Objective:   General: alert awake well-oriented, no acute distress.   HEENT: EOMI, no Icterus, no Pallor,  mucosa moist.  Neck: Neck is supple, No JVD  Lungs: breathsounds normal, no respiratory distress on inspection, no rhonchi, no rales,   CVS: heart sounds normal, regular rate and rhythm, no murmurs, no rubs. GI: soft, nontender, normal BS. Extremeties: no cyanosis, no edema,   Neuro: Alert, awake, oriented x3, No new focal deficits, moving all extremeties well. Skin: normal skin turgor, no skin rashes. Intake and Output:  Current Shift: No intake/output data recorded. Last three shifts: 06/23 1901 - 06/25 0700  In: 3500 [P.O.:1200; I.V.:2300]  Out: 750 [Urine:750]      Lab/Data Review:  Recent Labs     06/25/21 0318 06/24/21  1412   WBC 8.5 8.6   HGB 11.6 15.2   HCT 34.9* 45.1    427*     Recent Labs     06/25/21 0318 06/24/21  1412     142 140   K 3.7  3.7 2.5*   *  118* 114*   CO2 15*  15* 14*   GLU 93  94 134*   BUN 23*  23* 25*   CREA 1.29*  1.30* 1.80*   CA 8.7  8.8 9.5   MG  --  2.2   PHOS 2.3*  --    ALB 3.2*  3.3* 4.0   TBILI 0.4 0.4   ALT 12 12     No results for input(s): PH, PCO2, PO2, HCO3, FIO2 in the last 72 hours. Recent Results (from the past 24 hour(s))   METABOLIC PANEL, COMPREHENSIVE    Collection Time: 06/25/21  3:18 AM   Result Value Ref Range    Sodium 142 136 - 145 mmol/L    Potassium 3.7 3.5 - 5.1 mmol/L    Chloride 118 (H) 97 - 108 mmol/L    CO2 15 (LL) 21 - 32 mmol/L    Anion gap 9 5 - 15 mmol/L    Glucose 94 65 - 100 mg/dL    BUN 23 (H) 6 - 20 mg/dL    Creatinine 1.30 (H) 0.55 - 1.02 mg/dL    BUN/Creatinine ratio 18 12 - 20      GFR est AA 51 (L) >60 ml/min/1.73m2    GFR est non-AA 42 (L) >60 ml/min/1.73m2    Calcium 8.8 8.5 - 10.1 mg/dL    Bilirubin, total 0.4 0.2 - 1.0 mg/dL    AST (SGOT) 9 (L) 15 - 37 U/L    ALT (SGPT) 12 12 - 78 U/L    Alk.  phosphatase 57 45 - 117 U/L    Protein, total 6.6 6.4 - 8.2 g/dL    Albumin 3.3 (L) 3.5 - 5.0 g/dL    Globulin 3.3 2.0 - 4.0 g/dL    A-G Ratio 1.0 (L) 1.1 - 2.2     CBC WITH AUTOMATED DIFF    Collection Time: 06/25/21  3:18 AM   Result Value Ref Range    WBC 8.5 3.6 - 11.0 K/uL    RBC 3.89 3.80 - 5.20 M/uL    HGB 11.6 11.5 - 16.0 g/dL    HCT 34.9 (L) 35.0 - 47.0 %    MCV 89.7 80.0 - 99.0 FL    MCH 29.8 26.0 - 34.0 PG    MCHC 33.2 30.0 - 36.5 g/dL    RDW 14.7 (H) 11.5 - 14.5 %    PLATELET 018 974 - 061 K/uL    MPV 11.7 8.9 - 12.9 FL    NRBC 0.0 0.0  WBC    ABSOLUTE NRBC 0.00 0.00 - 0.01 K/uL    NEUTROPHILS 48 32 - 75 %    LYMPHOCYTES 37 12 - 49 %    MONOCYTES 11 5 - 13 %    EOSINOPHILS 3 0 - 7 %    BASOPHILS 1 0 - 1 %    IMMATURE GRANULOCYTES 0 0 - 0.5 %    ABS. NEUTROPHILS 4.1 1.8 - 8.0 K/UL    ABS. LYMPHOCYTES 3.2 0.8 - 3.5 K/UL    ABS. MONOCYTES 0.9 0.0 - 1.0 K/UL    ABS. EOSINOPHILS 0.2 0.0 - 0.4 K/UL    ABS. BASOPHILS 0.1 0.0 - 0.1 K/UL    ABS. IMM.  GRANS. 0.0 0.00 - 0.04 K/UL    DF AUTOMATED     RENAL FUNCTION PANEL    Collection Time: 06/25/21  3:18 AM   Result Value Ref Range    Sodium 142 136 - 145 mmol/L    Potassium 3.7 3.5 - 5.1 mmol/L    Chloride 119 (H) 97 - 108 mmol/L    CO2 15 (LL) 21 - 32 mmol/L    Anion gap 8 5 - 15 mmol/L    Glucose 93 65 - 100 mg/dL    BUN 23 (H) 6 - 20 mg/dL    Creatinine 1.29 (H) 0.55 - 1.02 mg/dL    BUN/Creatinine ratio 18 12 - 20      GFR est AA 51 (L) >60 ml/min/1.73m2    GFR est non-AA 42 (L) >60 ml/min/1.73m2    Calcium 8.7 8.5 - 10.1 mg/dL    Phosphorus 2.3 (L) 2.6 - 4.7 mg/dL    Albumin 3.2 (L) 3.5 - 5.0 g/dL   CK    Collection Time: 06/25/21  3:18 AM   Result Value Ref Range    CK 80 26 - 192 U/L   CORTISOL, AM    Collection Time: 06/25/21  3:18 AM   Result Value Ref Range    Cortisol, a.m. 5.3 4.30 - 22.45 ug/dL   DRUG SCREEN, URINE    Collection Time: 06/25/21  4:00 AM   Result Value Ref Range    AMPHETAMINES Negative Negative      BARBITURATES Negative Negative      BENZODIAZEPINES Negative Negative      COCAINE Negative Negative      METHADONE Negative Negative      OPIATES Negative Negative      PCP(PHENCYCLIDINE) Negative Negative      THC (TH-CANNABINOL) Negative Negative      Drug screen comment        This test is a screen for drugs of abuse in a medical setting only (i.e., they are unconfirmed results and as such must not be used for non-medical purposes, e.g.,employment testing, legal testing). Due to its inherent nature, false positive (FP) and false negative (FN) results may be obtained. Therefore, if necessary for medical care, recommend confirmation of positive findings by GC/MS. SODIUM, UR, RANDOM    Collection Time: 06/25/21  4:00 AM   Result Value Ref Range    Sodium,urine random 6 mmol/L   CHLORIDE, URINE RANDOM    Collection Time: 06/25/21  4:00 AM   Result Value Ref Range    Chloride,urine random 43 mmol/L   POTASSIUM, UR, RANDOM    Collection Time: 06/25/21  4:00 AM   Result Value Ref Range    Potassium urine, random 82 mmol/L   PROTEIN/CREATININE RATIO, URINE    Collection Time: 06/25/21  4:00 AM   Result Value Ref Range    Protein, urine random 58 (H) 0.0 - 11.9 mg/dL    Creatinine, urine 202.00 mg/dL    Protein/Creat. 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 3 times daily and also add spironolactone 12.5 mg p.o. twice daily . We will continue to monitor potassium levels closely and adjust the doses of potassium citrate and spironolactone as needed   Explained the serious nature of severe hypokalemia to the patient and advised full compliance with all her medications as prescribed    2.   Acute kidney injury: Probably prerenal azotemia secondary to dehydration/hypotension  Improved renal functions with creatinine down from 1.8-1.3 today  Continue IV fluids and continue to monitor renal functions     2. Metabolic acidosis; nongap acidosis   secondary to type I RTA  We will add potassium citrate 15 mEq p.o. 3 times daily and continue to monitor CO2 levels and potassium levels    3. Hypotension: Improved hemodynamic status  Continue IV fluids, and monitor blood pressures     4.  Generalized weakness: Symptomatically improved with improvement in potassium level   Will continue to  monitor      4. Osteoarthritis/neck pains: Appears to be chronic   NSAIDs were recently discontinued.     If repeat labs are stable patient can be discharged home tomorrow        Signed By: Jaskaran Hernandez MD     June 25, 2021

## 2021-06-25 NOTE — PROGRESS NOTES
Myself and Leelee Lopes RN performed the initial skin assessment. Pt skin is clean, dry, and intact with no signs of breakdown.

## 2021-06-25 NOTE — PROGRESS NOTES
General Daily Progress Note          Patient Name:   Chris Hannah       YOB: 1960       Age:  64 y.o. Admit Date: 6/24/2021      Subjective:       Patient feeling much better this morning        Objective:     Visit Vitals  /66 (BP 1 Location: Right upper arm, BP Patient Position: At rest)   Pulse 71   Temp 98.2 °F (36.8 °C)   Resp 18   Ht 5' (1.524 m)   Wt 83.9 kg (185 lb)   SpO2 97%   Breastfeeding No   BMI 36.13 kg/m²        Recent Results (from the past 24 hour(s))   CBC WITH AUTOMATED DIFF    Collection Time: 06/24/21  2:12 PM   Result Value Ref Range    WBC 8.6 3.6 - 11.0 K/uL    RBC 5.08 3.80 - 5.20 M/uL    HGB 15.2 11.5 - 16.0 g/dL    HCT 45.1 35.0 - 47.0 %    MCV 88.8 80.0 - 99.0 FL    MCH 29.9 26.0 - 34.0 PG    MCHC 33.7 30.0 - 36.5 g/dL    RDW 14.6 (H) 11.5 - 14.5 %    PLATELET 280 (H) 870 - 400 K/uL    MPV 10.9 8.9 - 12.9 FL    NRBC 0.0 0.0  WBC    ABSOLUTE NRBC 0.00 0.00 - 0.01 K/uL    NEUTROPHILS 60 32 - 75 %    LYMPHOCYTES 29 12 - 49 %    MONOCYTES 8 5 - 13 %    EOSINOPHILS 2 0 - 7 %    BASOPHILS 1 0 - 1 %    IMMATURE GRANULOCYTES 0 0 - 0.5 %    ABS. NEUTROPHILS 5.2 1.8 - 8.0 K/UL    ABS. LYMPHOCYTES 2.5 0.8 - 3.5 K/UL    ABS. MONOCYTES 0.6 0.0 - 1.0 K/UL    ABS. EOSINOPHILS 0.1 0.0 - 0.4 K/UL    ABS. BASOPHILS 0.1 0.0 - 0.1 K/UL    ABS. IMM.  GRANS. 0.0 0.00 - 0.04 K/UL    DF AUTOMATED     METABOLIC PANEL, COMPREHENSIVE    Collection Time: 06/24/21  2:12 PM   Result Value Ref Range    Sodium 140 136 - 145 mmol/L    Potassium 2.5 (LL) 3.5 - 5.1 mmol/L    Chloride 114 (H) 97 - 108 mmol/L    CO2 14 (LL) 21 - 32 mmol/L    Anion gap 12 5 - 15 mmol/L    Glucose 134 (H) 65 - 100 mg/dL    BUN 25 (H) 6 - 20 mg/dL    Creatinine 1.80 (H) 0.55 - 1.02 mg/dL    BUN/Creatinine ratio 14 12 - 20      GFR est AA 35 (L) >60 ml/min/1.73m2    GFR est non-AA 29 (L) >60 ml/min/1.73m2    Calcium 9.5 8.5 - 10.1 mg/dL    Bilirubin, total 0.4 0.2 - 1.0 mg/dL    AST (SGOT) 7 (L) 15 - 37 U/L    ALT (SGPT) 12 12 - 78 U/L    Alk. phosphatase 76 45 - 117 U/L    Protein, total 8.4 (H) 6.4 - 8.2 g/dL    Albumin 4.0 3.5 - 5.0 g/dL    Globulin 4.4 (H) 2.0 - 4.0 g/dL    A-G Ratio 0.9 (L) 1.1 - 2.2     MAGNESIUM    Collection Time: 06/24/21  2:12 PM   Result Value Ref Range    Magnesium 2.2 1.6 - 2.4 mg/dL   EKG, 12 LEAD, INITIAL    Collection Time: 06/24/21  4:56 PM   Result Value Ref Range    Ventricular Rate 57 BPM    Atrial Rate 57 BPM    P-R Interval 164 ms    QRS Duration 74 ms    Q-T Interval 384 ms    QTC Calculation (Bezet) 373 ms    Calculated P Axis 46 degrees    Calculated R Axis 26 degrees    Calculated T Axis 74 degrees    Diagnosis       Sinus bradycardia  Nonspecific T wave abnormality  Abnormal ECG    Confirmed by AURELIA Covarrubias ([id number]) on 6/24/2021 6:30:27 PM  Also confirmed by Dalila Cannon ([id number]),  YOBANY CAMPBELL ([id number])    on 6/25/2021 3:57:59 AM     METABOLIC PANEL, COMPREHENSIVE    Collection Time: 06/25/21  3:18 AM   Result Value Ref Range    Sodium 142 136 - 145 mmol/L    Potassium 3.7 3.5 - 5.1 mmol/L    Chloride 118 (H) 97 - 108 mmol/L    CO2 15 (LL) 21 - 32 mmol/L    Anion gap 9 5 - 15 mmol/L    Glucose 94 65 - 100 mg/dL    BUN 23 (H) 6 - 20 mg/dL    Creatinine 1.30 (H) 0.55 - 1.02 mg/dL    BUN/Creatinine ratio 18 12 - 20      GFR est AA 51 (L) >60 ml/min/1.73m2    GFR est non-AA 42 (L) >60 ml/min/1.73m2    Calcium 8.8 8.5 - 10.1 mg/dL    Bilirubin, total 0.4 0.2 - 1.0 mg/dL    AST (SGOT) 9 (L) 15 - 37 U/L    ALT (SGPT) 12 12 - 78 U/L    Alk.  phosphatase 57 45 - 117 U/L    Protein, total 6.6 6.4 - 8.2 g/dL    Albumin 3.3 (L) 3.5 - 5.0 g/dL    Globulin 3.3 2.0 - 4.0 g/dL    A-G Ratio 1.0 (L) 1.1 - 2.2     CBC WITH AUTOMATED DIFF    Collection Time: 06/25/21  3:18 AM   Result Value Ref Range    WBC 8.5 3.6 - 11.0 K/uL    RBC 3.89 3.80 - 5.20 M/uL    HGB 11.6 11.5 - 16.0 g/dL    HCT 34.9 (L) 35.0 - 47.0 %    MCV 89.7 80.0 - 99.0 FL    MCH 29.8 26.0 - 34.0 PG    MCHC 33.2 30.0 - 36.5 g/dL    RDW 14.7 (H) 11.5 - 14.5 %    PLATELET 199 459 - 564 K/uL    MPV 11.7 8.9 - 12.9 FL    NRBC 0.0 0.0  WBC    ABSOLUTE NRBC 0.00 0.00 - 0.01 K/uL    NEUTROPHILS 48 32 - 75 %    LYMPHOCYTES 37 12 - 49 %    MONOCYTES 11 5 - 13 %    EOSINOPHILS 3 0 - 7 %    BASOPHILS 1 0 - 1 %    IMMATURE GRANULOCYTES 0 0 - 0.5 %    ABS. NEUTROPHILS 4.1 1.8 - 8.0 K/UL    ABS. LYMPHOCYTES 3.2 0.8 - 3.5 K/UL    ABS. MONOCYTES 0.9 0.0 - 1.0 K/UL    ABS. EOSINOPHILS 0.2 0.0 - 0.4 K/UL    ABS. BASOPHILS 0.1 0.0 - 0.1 K/UL    ABS. IMM. GRANS. 0.0 0.00 - 0.04 K/UL    DF AUTOMATED     RENAL FUNCTION PANEL    Collection Time: 06/25/21  3:18 AM   Result Value Ref Range    Sodium 142 136 - 145 mmol/L    Potassium 3.7 3.5 - 5.1 mmol/L    Chloride 119 (H) 97 - 108 mmol/L    CO2 15 (LL) 21 - 32 mmol/L    Anion gap 8 5 - 15 mmol/L    Glucose 93 65 - 100 mg/dL    BUN 23 (H) 6 - 20 mg/dL    Creatinine 1.29 (H) 0.55 - 1.02 mg/dL    BUN/Creatinine ratio 18 12 - 20      GFR est AA 51 (L) >60 ml/min/1.73m2    GFR est non-AA 42 (L) >60 ml/min/1.73m2    Calcium 8.7 8.5 - 10.1 mg/dL    Phosphorus 2.3 (L) 2.6 - 4.7 mg/dL    Albumin 3.2 (L) 3.5 - 5.0 g/dL   CK    Collection Time: 06/25/21  3:18 AM   Result Value Ref Range    CK 80 26 - 192 U/L   DRUG SCREEN, URINE    Collection Time: 06/25/21  4:00 AM   Result Value Ref Range    AMPHETAMINES Negative Negative      BARBITURATES Negative Negative      BENZODIAZEPINES Negative Negative      COCAINE Negative Negative      METHADONE Negative Negative      OPIATES Negative Negative      PCP(PHENCYCLIDINE) Negative Negative      THC (TH-CANNABINOL) Negative Negative      Drug screen comment        This test is a screen for drugs of abuse in a medical setting only (i.e., they are unconfirmed results and as such must not be used for non-medical purposes, e.g.,employment testing, legal testing). Due to its inherent nature, false positive (FP) and false negative (FN) results may be obtained. Therefore, if necessary for medical care, recommend confirmation of positive findings by GC/MS. SODIUM, UR, RANDOM    Collection Time: 06/25/21  4:00 AM   Result Value Ref Range    Sodium,urine random 6 mmol/L   CHLORIDE, URINE RANDOM    Collection Time: 06/25/21  4:00 AM   Result Value Ref Range    Chloride,urine random 43 mmol/L   POTASSIUM, UR, RANDOM    Collection Time: 06/25/21  4:00 AM   Result Value Ref Range    Potassium urine, random 82 mmol/L   PROTEIN/CREATININE RATIO, URINE    Collection Time: 06/25/21  4:00 AM   Result Value Ref Range    Protein, urine random 58 (H) 0.0 - 11.9 mg/dL    Creatinine, urine 202.00 mg/dL    Protein/Creat. urine Ratio 0.3       [unfilled]      Review of Systems    Constitutional: Negative for chills and fever. HENT: Negative. Eyes: Negative. Respiratory: Negative. Cardiovascular: Negative. Gastrointestinal: Negative for abdominal pain and nausea. Skin: Negative. Neurological: Negative. Physical Exam:      Constitutional: pt is oriented to person, place, and time. HENT:   Head: Normocephalic and atraumatic. Eyes: Pupils are equal, round, and reactive to light. EOM are normal.   Cardiovascular: Normal rate, regular rhythm and normal heart sounds. Pulmonary/Chest: Breath sounds normal. No wheezes. No rales. Exhibits no tenderness. Abdominal: Soft. Bowel sounds are normal. There is no abdominal tenderness. There is no rebound and no guarding. Musculoskeletal: Normal range of motion. Neurological: pt is alert and oriented to person, place, and time.      No orders to display        Recent Results (from the past 24 hour(s))   CBC WITH AUTOMATED DIFF    Collection Time: 06/24/21  2:12 PM   Result Value Ref Range    WBC 8.6 3.6 - 11.0 K/uL    RBC 5.08 3.80 - 5.20 M/uL    HGB 15.2 11.5 - 16.0 g/dL    HCT 45.1 35.0 - 47.0 %    MCV 88.8 80.0 - 99.0 FL    MCH 29.9 26.0 - 34.0 PG    MCHC 33.7 30.0 - 36.5 g/dL    RDW 14.6 (H) 11.5 - 14.5 % PLATELET 110 (H) 734 - 400 K/uL    MPV 10.9 8.9 - 12.9 FL    NRBC 0.0 0.0  WBC    ABSOLUTE NRBC 0.00 0.00 - 0.01 K/uL    NEUTROPHILS 60 32 - 75 %    LYMPHOCYTES 29 12 - 49 %    MONOCYTES 8 5 - 13 %    EOSINOPHILS 2 0 - 7 %    BASOPHILS 1 0 - 1 %    IMMATURE GRANULOCYTES 0 0 - 0.5 %    ABS. NEUTROPHILS 5.2 1.8 - 8.0 K/UL    ABS. LYMPHOCYTES 2.5 0.8 - 3.5 K/UL    ABS. MONOCYTES 0.6 0.0 - 1.0 K/UL    ABS. EOSINOPHILS 0.1 0.0 - 0.4 K/UL    ABS. BASOPHILS 0.1 0.0 - 0.1 K/UL    ABS. IMM. GRANS. 0.0 0.00 - 0.04 K/UL    DF AUTOMATED     METABOLIC PANEL, COMPREHENSIVE    Collection Time: 06/24/21  2:12 PM   Result Value Ref Range    Sodium 140 136 - 145 mmol/L    Potassium 2.5 (LL) 3.5 - 5.1 mmol/L    Chloride 114 (H) 97 - 108 mmol/L    CO2 14 (LL) 21 - 32 mmol/L    Anion gap 12 5 - 15 mmol/L    Glucose 134 (H) 65 - 100 mg/dL    BUN 25 (H) 6 - 20 mg/dL    Creatinine 1.80 (H) 0.55 - 1.02 mg/dL    BUN/Creatinine ratio 14 12 - 20      GFR est AA 35 (L) >60 ml/min/1.73m2    GFR est non-AA 29 (L) >60 ml/min/1.73m2    Calcium 9.5 8.5 - 10.1 mg/dL    Bilirubin, total 0.4 0.2 - 1.0 mg/dL    AST (SGOT) 7 (L) 15 - 37 U/L    ALT (SGPT) 12 12 - 78 U/L    Alk.  phosphatase 76 45 - 117 U/L    Protein, total 8.4 (H) 6.4 - 8.2 g/dL    Albumin 4.0 3.5 - 5.0 g/dL    Globulin 4.4 (H) 2.0 - 4.0 g/dL    A-G Ratio 0.9 (L) 1.1 - 2.2     MAGNESIUM    Collection Time: 06/24/21  2:12 PM   Result Value Ref Range    Magnesium 2.2 1.6 - 2.4 mg/dL   EKG, 12 LEAD, INITIAL    Collection Time: 06/24/21  4:56 PM   Result Value Ref Range    Ventricular Rate 57 BPM    Atrial Rate 57 BPM    P-R Interval 164 ms    QRS Duration 74 ms    Q-T Interval 384 ms    QTC Calculation (Bezet) 373 ms    Calculated P Axis 46 degrees    Calculated R Axis 26 degrees    Calculated T Axis 74 degrees    Diagnosis       Sinus bradycardia  Nonspecific T wave abnormality  Abnormal ECG    Confirmed by Dinora Prieto [id number]) on 6/24/2021 6:30:27 PM  Also confirmed by Tabatha Calix, AURELIA [phone]),  YOBANY CAMPBELL ([id number])    on 6/25/2021 8:35:59 AM     METABOLIC PANEL, COMPREHENSIVE    Collection Time: 06/25/21  3:18 AM   Result Value Ref Range    Sodium 142 136 - 145 mmol/L    Potassium 3.7 3.5 - 5.1 mmol/L    Chloride 118 (H) 97 - 108 mmol/L    CO2 15 (LL) 21 - 32 mmol/L    Anion gap 9 5 - 15 mmol/L    Glucose 94 65 - 100 mg/dL    BUN 23 (H) 6 - 20 mg/dL    Creatinine 1.30 (H) 0.55 - 1.02 mg/dL    BUN/Creatinine ratio 18 12 - 20      GFR est AA 51 (L) >60 ml/min/1.73m2    GFR est non-AA 42 (L) >60 ml/min/1.73m2    Calcium 8.8 8.5 - 10.1 mg/dL    Bilirubin, total 0.4 0.2 - 1.0 mg/dL    AST (SGOT) 9 (L) 15 - 37 U/L    ALT (SGPT) 12 12 - 78 U/L    Alk. phosphatase 57 45 - 117 U/L    Protein, total 6.6 6.4 - 8.2 g/dL    Albumin 3.3 (L) 3.5 - 5.0 g/dL    Globulin 3.3 2.0 - 4.0 g/dL    A-G Ratio 1.0 (L) 1.1 - 2.2     CBC WITH AUTOMATED DIFF    Collection Time: 06/25/21  3:18 AM   Result Value Ref Range    WBC 8.5 3.6 - 11.0 K/uL    RBC 3.89 3.80 - 5.20 M/uL    HGB 11.6 11.5 - 16.0 g/dL    HCT 34.9 (L) 35.0 - 47.0 %    MCV 89.7 80.0 - 99.0 FL    MCH 29.8 26.0 - 34.0 PG    MCHC 33.2 30.0 - 36.5 g/dL    RDW 14.7 (H) 11.5 - 14.5 %    PLATELET 733 481 - 805 K/uL    MPV 11.7 8.9 - 12.9 FL    NRBC 0.0 0.0  WBC    ABSOLUTE NRBC 0.00 0.00 - 0.01 K/uL    NEUTROPHILS 48 32 - 75 %    LYMPHOCYTES 37 12 - 49 %    MONOCYTES 11 5 - 13 %    EOSINOPHILS 3 0 - 7 %    BASOPHILS 1 0 - 1 %    IMMATURE GRANULOCYTES 0 0 - 0.5 %    ABS. NEUTROPHILS 4.1 1.8 - 8.0 K/UL    ABS. LYMPHOCYTES 3.2 0.8 - 3.5 K/UL    ABS. MONOCYTES 0.9 0.0 - 1.0 K/UL    ABS. EOSINOPHILS 0.2 0.0 - 0.4 K/UL    ABS. BASOPHILS 0.1 0.0 - 0.1 K/UL    ABS. IMM.  GRANS. 0.0 0.00 - 0.04 K/UL    DF AUTOMATED     RENAL FUNCTION PANEL    Collection Time: 06/25/21  3:18 AM   Result Value Ref Range    Sodium 142 136 - 145 mmol/L    Potassium 3.7 3.5 - 5.1 mmol/L    Chloride 119 (H) 97 - 108 mmol/L    CO2 15 (LL) 21 - 32 mmol/L    Anion gap 8 5 - 15 mmol/L    Glucose 93 65 - 100 mg/dL    BUN 23 (H) 6 - 20 mg/dL    Creatinine 1.29 (H) 0.55 - 1.02 mg/dL    BUN/Creatinine ratio 18 12 - 20      GFR est AA 51 (L) >60 ml/min/1.73m2    GFR est non-AA 42 (L) >60 ml/min/1.73m2    Calcium 8.7 8.5 - 10.1 mg/dL    Phosphorus 2.3 (L) 2.6 - 4.7 mg/dL    Albumin 3.2 (L) 3.5 - 5.0 g/dL   CK    Collection Time: 06/25/21  3:18 AM   Result Value Ref Range    CK 80 26 - 192 U/L   DRUG SCREEN, URINE    Collection Time: 06/25/21  4:00 AM   Result Value Ref Range    AMPHETAMINES Negative Negative      BARBITURATES Negative Negative      BENZODIAZEPINES Negative Negative      COCAINE Negative Negative      METHADONE Negative Negative      OPIATES Negative Negative      PCP(PHENCYCLIDINE) Negative Negative      THC (TH-CANNABINOL) Negative Negative      Drug screen comment        This test is a screen for drugs of abuse in a medical setting only (i.e., they are unconfirmed results and as such must not be used for non-medical purposes, e.g.,employment testing, legal testing). Due to its inherent nature, false positive (FP) and false negative (FN) results may be obtained. Therefore, if necessary for medical care, recommend confirmation of positive findings by GC/MS. SODIUM, UR, RANDOM    Collection Time: 06/25/21  4:00 AM   Result Value Ref Range    Sodium,urine random 6 mmol/L   CHLORIDE, URINE RANDOM    Collection Time: 06/25/21  4:00 AM   Result Value Ref Range    Chloride,urine random 43 mmol/L   POTASSIUM, UR, RANDOM    Collection Time: 06/25/21  4:00 AM   Result Value Ref Range    Potassium urine, random 82 mmol/L   PROTEIN/CREATININE RATIO, URINE    Collection Time: 06/25/21  4:00 AM   Result Value Ref Range    Protein, urine random 58 (H) 0.0 - 11.9 mg/dL    Creatinine, urine 202.00 mg/dL    Protein/Creat.  urine Ratio 0.3         Results     Procedure Component Value Units Date/Time    CULTURE, URINE [[id number]] Collected: 06/24/21 1630    Order Status: Completed Specimen: Urine Updated: 06/25/21 0828           Labs:     Recent Labs     06/25/21 0318 06/24/21  1412   WBC 8.5 8.6   HGB 11.6 15.2   HCT 34.9* 45.1    427*     Recent Labs     06/25/21 0318 06/24/21  1412     142 140   K 3.7  3.7 2.5*   *  118* 114*   CO2 15*  15* 14*   BUN 23*  23* 25*   CREA 1.29*  1.30* 1.80*   GLU 93  94 134*   CA 8.7  8.8 9.5   MG  --  2.2   PHOS 2.3*  --      Recent Labs     06/25/21 0318 06/24/21  1412   ALT 12 12   AP 57 76   TBILI 0.4 0.4   TP 6.6 8.4*   ALB 3.2*  3.3* 4.0   GLOB 3.3 4.4*     No results for input(s): INR, PTP, APTT, INREXT in the last 72 hours. No results for input(s): FE, TIBC, PSAT, FERR in the last 72 hours. No results found for: FOL, RBCF   No results for input(s): PH, PCO2, PO2 in the last 72 hours.   Recent Labs     06/25/21 0318   CPK 80     No results found for: CHOL, CHOLX, CHLST, CHOLV, HDL, HDLP, LDL, LDLC, DLDLP, TGLX, TRIGL, TRIGP, CHHD, CHHDX  No results found for: GLUCPOC  Lab Results   Component Value Date/Time    Color Yellow/Straw 06/24/2021 04:00 AM    Appearance Clear 06/24/2021 04:00 AM    Specific gravity 1.021 06/24/2021 04:00 AM    pH (UA) 6.0 06/24/2021 04:00 AM    Protein 30 (A) 06/24/2021 04:00 AM    Glucose Negative 06/24/2021 04:00 AM    Ketone Negative 06/24/2021 04:00 AM    Bilirubin Negative 06/24/2021 04:00 AM    Urobilinogen 0.1 06/24/2021 04:00 AM    Nitrites Negative 06/24/2021 04:00 AM    Leukocyte Esterase Trace (A) 06/24/2021 04:00 AM    Bacteria Negative 06/24/2021 04:00 AM    WBC 20-50 06/24/2021 04:00 AM    RBC 0-5 06/24/2021 04:00 AM         Assessment:       Hypokalemia-resolve   Intractable nausea   Metabolic acidosis   Acute kidney injury  Generalized weakness   COPD   History of hypokalemia   RLS  Hyperlipidemia  GERD    Plan:     Continue current medications  Continue IV fluids  Repeat the labs    Patient on sodium bicarb drip        Current Facility-Administered Medications:     calcium carbonate (TUMS) chewable tablet 400 mg [elemental], 400 mg, Oral, Q4H PRN, Denisse Rosenbaum MD, 400 mg at 06/25/21 0101    sodium bicarbonate (8.4%) 150 mEq in dextrose 5% 1,000 mL infusion, , IntraVENous, CONTINUOUS, Shakeel Valencia MD, Last Rate: 50 mL/hr at 06/25/21 1151, Rate Change at 06/25/21 1151    spironolactone (ALDACTONE) tablet 12.5 mg, 12.5 mg, Oral, BID, Shakeel Valencia MD, 12.5 mg at 06/25/21 1151    potassium citrate (UROCIT-K5) tablet 15 mEq, 15 mEq, Oral, TID, Shakeel Valencia MD, 15 mEq at 06/25/21 1151    ferrous sulfate tablet 325 mg, 325 mg, Oral, ACB, Denisse Rosenbaum MD, 325 mg at 06/25/21 0551    PARoxetine (PAXIL) tablet 40 mg, 40 mg, Oral, DAILY, Denisse Rosenbaum MD, 40 mg at 06/25/21 1032    pregabalin (LYRICA) capsule 150 mg, 150 mg, Oral, BID, Denisse Rosenbaum MD, 150 mg at 06/25/21 1032    rOPINIRole (REQUIP) tablet 0.25 mg, 0.25 mg, Oral, TID, Vicente Rosenbaum MD, 0.25 mg at 06/25/21 1033    acetaminophen (TYLENOL) tablet 650 mg, 650 mg, Oral, Q6H PRN **OR** acetaminophen (TYLENOL) suppository 650 mg, 650 mg, Rectal, Q6H PRN, Vicente Rosenbaum MD    polyethylene glycol (MIRALAX) packet 17 g, 17 g, Oral, DAILY PRN, Vicente Rosenbaum MD    ondansetron (ZOFRAN ODT) tablet 4 mg, 4 mg, Oral, Q8H PRN **OR** ondansetron (ZOFRAN) injection 4 mg, 4 mg, IntraVENous, Q6H PRN, Denisse Rosenbaum MD    heparin (porcine) injection 5,000 Units, 5,000 Units, SubCUTAneous, Q8H, Denisse Rosenbaum MD, 5,000 Units at 06/25/21 0525    pantoprazole (PROTONIX) tablet 40 mg, 40 mg, Oral, DAILY, Denisse Rosenbaum MD, 40 mg at 06/24/21 2026

## 2021-06-25 NOTE — PROGRESS NOTES
DC plan is home with spouse. Patient not yet seen by PT/OT. CM to follow to assess any further needs.

## 2021-06-26 VITALS
OXYGEN SATURATION: 96 % | WEIGHT: 185 LBS | TEMPERATURE: 97.6 F | SYSTOLIC BLOOD PRESSURE: 131 MMHG | HEART RATE: 71 BPM | RESPIRATION RATE: 18 BRPM | HEIGHT: 60 IN | DIASTOLIC BLOOD PRESSURE: 82 MMHG | BODY MASS INDEX: 36.32 KG/M2

## 2021-06-26 LAB
ALBUMIN SERPL-MCNC: 3.2 G/DL (ref 3.5–5)
ALBUMIN SERPL-MCNC: 3.2 G/DL (ref 3.5–5)
ALBUMIN/GLOB SERPL: 0.9 {RATIO} (ref 1.1–2.2)
ALP SERPL-CCNC: 56 U/L (ref 45–117)
ALT SERPL-CCNC: 12 U/L (ref 12–78)
ANION GAP SERPL CALC-SCNC: 7 MMOL/L (ref 5–15)
ANION GAP SERPL CALC-SCNC: 8 MMOL/L (ref 5–15)
AST SERPL W P-5'-P-CCNC: 6 U/L (ref 15–37)
BASOPHILS # BLD: 0.1 K/UL (ref 0–0.1)
BASOPHILS NFR BLD: 1 % (ref 0–1)
BILIRUB SERPL-MCNC: 0.3 MG/DL (ref 0.2–1)
BUN SERPL-MCNC: 19 MG/DL (ref 6–20)
BUN SERPL-MCNC: 19 MG/DL (ref 6–20)
BUN/CREAT SERPL: 21 (ref 12–20)
BUN/CREAT SERPL: 21 (ref 12–20)
CA-I BLD-MCNC: 8.8 MG/DL (ref 8.5–10.1)
CA-I BLD-MCNC: 8.9 MG/DL (ref 8.5–10.1)
CHLORIDE SERPL-SCNC: 110 MMOL/L (ref 97–108)
CHLORIDE SERPL-SCNC: 110 MMOL/L (ref 97–108)
CO2 SERPL-SCNC: 27 MMOL/L (ref 21–32)
CO2 SERPL-SCNC: 27 MMOL/L (ref 21–32)
CREAT SERPL-MCNC: 0.9 MG/DL (ref 0.55–1.02)
CREAT SERPL-MCNC: 0.91 MG/DL (ref 0.55–1.02)
DIFFERENTIAL METHOD BLD: ABNORMAL
EOSINOPHIL # BLD: 0.3 K/UL (ref 0–0.4)
EOSINOPHIL NFR BLD: 4 % (ref 0–7)
ERYTHROCYTE [DISTWIDTH] IN BLOOD BY AUTOMATED COUNT: 14.8 % (ref 11.5–14.5)
GLOBULIN SER CALC-MCNC: 3.4 G/DL (ref 2–4)
GLUCOSE SERPL-MCNC: 95 MG/DL (ref 65–100)
GLUCOSE SERPL-MCNC: 96 MG/DL (ref 65–100)
HCT VFR BLD AUTO: 35.1 % (ref 35–47)
HGB BLD-MCNC: 11.6 G/DL (ref 11.5–16)
IMM GRANULOCYTES # BLD AUTO: 0 K/UL (ref 0–0.04)
IMM GRANULOCYTES NFR BLD AUTO: 0 % (ref 0–0.5)
LYMPHOCYTES # BLD: 2.5 K/UL (ref 0.8–3.5)
LYMPHOCYTES NFR BLD: 40 % (ref 12–49)
MCH RBC QN AUTO: 30 PG (ref 26–34)
MCHC RBC AUTO-ENTMCNC: 33 G/DL (ref 30–36.5)
MCV RBC AUTO: 90.7 FL (ref 80–99)
MONOCYTES # BLD: 0.5 K/UL (ref 0–1)
MONOCYTES NFR BLD: 8 % (ref 5–13)
NEUTS SEG # BLD: 2.9 K/UL (ref 1.8–8)
NEUTS SEG NFR BLD: 47 % (ref 32–75)
NRBC # BLD: 0 K/UL (ref 0–0.01)
NRBC BLD-RTO: 0 PER 100 WBC
PHOSPHATE SERPL-MCNC: 2.6 MG/DL (ref 2.6–4.7)
PLATELET # BLD AUTO: 307 K/UL (ref 150–400)
PMV BLD AUTO: 11.3 FL (ref 8.9–12.9)
POTASSIUM SERPL-SCNC: 2.9 MMOL/L (ref 3.5–5.1)
POTASSIUM SERPL-SCNC: 2.9 MMOL/L (ref 3.5–5.1)
PROT SERPL-MCNC: 6.6 G/DL (ref 6.4–8.2)
RBC # BLD AUTO: 3.87 M/UL (ref 3.8–5.2)
SODIUM SERPL-SCNC: 144 MMOL/L (ref 136–145)
SODIUM SERPL-SCNC: 145 MMOL/L (ref 136–145)
WBC # BLD AUTO: 6.3 K/UL (ref 3.6–11)

## 2021-06-26 PROCEDURE — 74011250636 HC RX REV CODE- 250/636: Performed by: FAMILY MEDICINE

## 2021-06-26 PROCEDURE — 80069 RENAL FUNCTION PANEL: CPT

## 2021-06-26 PROCEDURE — 36415 COLL VENOUS BLD VENIPUNCTURE: CPT

## 2021-06-26 PROCEDURE — 97161 PT EVAL LOW COMPLEX 20 MIN: CPT

## 2021-06-26 PROCEDURE — 74011250637 HC RX REV CODE- 250/637: Performed by: INTERNAL MEDICINE

## 2021-06-26 PROCEDURE — 80053 COMPREHEN METABOLIC PANEL: CPT

## 2021-06-26 PROCEDURE — 97110 THERAPEUTIC EXERCISES: CPT

## 2021-06-26 PROCEDURE — 74011250637 HC RX REV CODE- 250/637: Performed by: FAMILY MEDICINE

## 2021-06-26 PROCEDURE — 85025 COMPLETE CBC W/AUTO DIFF WBC: CPT

## 2021-06-26 RX ORDER — POTASSIUM CITRATE 5 MEQ/1
15 TABLET, EXTENDED RELEASE ORAL 3 TIMES DAILY
Qty: 60 TABLET | Refills: 0 | Status: SHIPPED | OUTPATIENT
Start: 2021-06-26 | End: 2021-07-06

## 2021-06-26 RX ORDER — POTASSIUM CHLORIDE 20 MEQ/1
40 TABLET, EXTENDED RELEASE ORAL EVERY 4 HOURS
Status: DISCONTINUED | OUTPATIENT
Start: 2021-06-26 | End: 2021-06-26 | Stop reason: HOSPADM

## 2021-06-26 RX ORDER — SPIRONOLACTONE 25 MG/1
12.5 TABLET ORAL 2 TIMES DAILY
Qty: 60 TABLET | Refills: 0 | Status: SHIPPED | OUTPATIENT
Start: 2021-06-26 | End: 2021-09-28

## 2021-06-26 RX ORDER — CALCIUM CARBONATE 200(500)MG
400 TABLET,CHEWABLE ORAL
Qty: 30 TABLET | Refills: 0 | Status: SHIPPED | OUTPATIENT
Start: 2021-06-26

## 2021-06-26 RX ADMIN — POTASSIUM CHLORIDE 40 MEQ: 1500 TABLET, EXTENDED RELEASE ORAL at 12:32

## 2021-06-26 RX ADMIN — ACETAMINOPHEN 650 MG: 325 TABLET ORAL at 09:09

## 2021-06-26 RX ADMIN — POTASSIUM CITRATE 15 MEQ: 5 TABLET ORAL at 09:10

## 2021-06-26 RX ADMIN — PANTOPRAZOLE SODIUM 40 MG: 40 TABLET, DELAYED RELEASE ORAL at 09:09

## 2021-06-26 RX ADMIN — PAROXETINE HYDROCHLORIDE 40 MG: 10 TABLET, FILM COATED ORAL at 09:09

## 2021-06-26 RX ADMIN — ROPINIROLE HYDROCHLORIDE 0.25 MG: 0.25 TABLET, FILM COATED ORAL at 09:09

## 2021-06-26 RX ADMIN — HEPARIN SODIUM 5000 UNITS: 5000 INJECTION INTRAVENOUS; SUBCUTANEOUS at 05:41

## 2021-06-26 RX ADMIN — SPIRONOLACTONE 12.5 MG: 25 TABLET ORAL at 09:09

## 2021-06-26 RX ADMIN — FERROUS SULFATE TAB 325 MG (65 MG ELEMENTAL FE) 325 MG: 325 (65 FE) TAB at 05:41

## 2021-06-26 RX ADMIN — PREGABALIN 150 MG: 100 CAPSULE ORAL at 09:09

## 2021-06-26 NOTE — PROGRESS NOTES
PHYSICAL THERAPY EVALUATION  Patient: Lb Zhang (75 y.o. female)  Date: 6/26/2021  Primary Diagnosis: Generalized weakness [R53.1]        Precautions: moderate   Fall risk     ASSESSMENT  Patient is a 64y.o. year old female with a past medical history of COPD, history of hypokalemia, GERD, RLS, hyperlipidemia, and chronic pain presents to the ED due to nausea, decreased appetite, and weakness for 1 week. Denies vomiting, chest pain, and SOB at time of assesment . She has had prior episodes like this when her potassium was low. Patient admitted to [facility] for further evaluation and treatment.      Based on the Physical Therapist objective data described below, the patient presents with ongoing hip knee and ankle weakness with noted moderate fall risk patterns for home and return to community bound level of activities ; patient currently considered home bound and would benefit from receiving home health care services ; patient stated agreeance with current DC recommendations and instructions to do daily timed walking while in hospital inside room ; TUG scores reveal 23 secs and COLE graded at somewhat hard at 11/20 ; use of knee orthotics also instructed to be considered due to intermittent pain on R knee stated but not apparent during visit at 0/10 PS   Skilled PT services to recommend 1 more visit prior to DC to address DC gait performance and stair climbing activity as tolerated and to educate skilled services and complexity of care / instructions needed at home to achieve highest mobility and safety status while achieveing highest quality of life     Current Level of Function Impacting Discharge (mobility/balance): moderate fall risk score     Functional Outcome Measure:   The patient scored CI  on the  outcome measure which is indicative of min  need for assistance     Other factors to consider for discharge: NA      Patient will benefit from skilled therapy intervention to address the above noted impairments. PLAN :  Recommendations and Planned Interventions: gait training, therapeutic exercises, patient and family training/education, and therapeutic activities      Frequency/Duration: Patient will be followed by physical therapy:  1 time a week to address goals. Recommendation for discharge: (in order for the patient to meet his/her long term goals)  Home health therapy services     This discharge recommendation:  A follow-up discussion with the attending provider and/or case management is planned    IF patient discharges home will need the following DME: none         SUBJECTIVE:   Patient stated I understand that I need some help and currently weak .     OBJECTIVE DATA SUMMARY:   HISTORY:    Past Medical History:   Diagnosis Date    Chronic obstructive pulmonary disease (HCC)     Chronic pain     neck, slipped disc    Ectopic pregnancy     GERD (gastroesophageal reflux disease)     Heart murmur     History of low potassium     Hypokalemia     Ill-defined condition     murmur    Psychiatric disorder     depression     Past Surgical History:   Procedure Laterality Date    HX APPENDECTOMY      HX OTHER SURGICAL      needle removal left arm       Personal factors and/or comorbidities impacting plan of care:     Home Situation  Home Environment: Private residence  # Steps to Enter: 10  Rails to Enter: Yes  Hand Rails : Bilateral  Wheelchair Ramp: No  One/Two Story Residence: Two story  # of Interior Steps: 10  Living Alone: No  Support Systems: Spouse/Significant Other/Partner  Patient Expects to be Discharged to[de-identified] [employer]  Current DME Used/Available at Home: None  Tub or Shower Type: Tub/Shower combination    PLOF: Pt IND  for ADLS/IADLS, IND with mobility prior to admission.      EXAMINATION/PRESENTATION/DECISION MAKING:   Critical Behavior:  Neurologic State: Alert, Appropriate for age  Orientation Level: Oriented X4  Cognition: Appropriate decision making, Follows commands, Appropriate safety awareness  Safety/Judgement: Good awareness of safety precautions  Hearing: Auditory  Auditory Impairment: None  Skin:  WNL   Edema: no edema palpated  Range Of Motion:  WNL on B hips knees and ankle joints without pain      Strength:    Strength: Generally decreased, functional     Grossly graded 3/5 on B hips knees and ankle ms grps      Tone & Sensation:    Normotonic on B uE and LE      Coordination: WNL      Vision: WNL        Functional Mobility:  Bed Mobility:  Rolling: Modified independent  Supine to Sit: Modified independent  Sit to Supine: Modified independent  Scooting: Modified independent  Transfers:  Sit to Stand: Modified independent  Stand to Sit: Modified independent  Stand Pivot Transfers: Modified independent     Bed to Chair: Modified independent  Lateral Transfers: Modified independent           Balance:   Sitting: Intact  Standing: Intact  Ambulation/Gait Training:     Patient was able to demonstrate walking inside room adlib x 20 ft ; TUG score at 23 secs with activity graded at somewhat hard for walking activity  11/20     Gait Description (WDL): Within defined limits     Right Side Weight Bearing: As tolerated  Left Side Weight Bearing: As tolerated      Stairs:      NT at this time         Therapeutic Exercises:   Standing balance activities - close chain exercises - gait training - timed x 6 mins ; standing marches x 2 min    Functional Mobility   [address] [place], [place] [id number] AM-PAC 6 Clicks         Basic Mobility Inpatient Short Form  How much difficulty does the patient currently have. .. Unable A Lot A Little None   1. Turning over in bed (including adjusting bedclothes, sheets and blankets)? [] 1   [] 2   [] 3   [x] 4   2. Sitting down on and standing up from a chair with arms ( e.g., wheelchair, bedside commode, etc.)   [] 1   [] 2   [] 3   [x] 4   3. Moving from lying on back to sitting on the side of the bed?    [] 1   [] 2   [] 3   [x] 4          How much help from another person does the patient currently need. .. Total A Lot A Little None   4. Moving to and from a bed to a chair (including a wheelchair)? [] 1   [] 2   [] 3   [x] 4   5. Need to walk in hospital room? [] 1   [] 2   [] 3   [x] 4   6. Climbing 3-5 steps with a railing? [] 1   [] 2   [x] 3   [] 4   © 2007, Trustees of [address] [place] [address] [address] [id number], under license to [employer]. All rights reserved     Score:  Initial: PSG  Most Recent: CI  (Date: 6/26/21)   Interpretation of Tool:  Represents activities that are increasingly more difficult (i.e. Bed mobility, Transfers, Gait). Score 24 23 22-20 19-15 14-10 9-7 6   Modifier CH CI CJ CK CL CM CN          Physical Therapy Evaluation Charge Determination   History Examination Presentation Decision-Making   LOW Complexity : Zero comorbidities / personal factors that will impact the outcome / POC LOW Complexity : 1-2 Standardized tests and measures addressing body structure, function, activity limitation and / or participation in recreation  LOW Complexity : Stable, uncomplicated  Other Functional Measure [facility] 6 CI - 6/26/21      Based on the above components, the patient evaluation is determined to be of the following complexity level: LOW     Pain Rating:  No pain stated     Activity Tolerance:   Fair  Please refer to the flowsheet for vital signs taken during this treatment. After treatment patient left in no apparent distress:   Sitting in chair, Call bell within reach, and Caregiver / family present    COMMUNICATION/EDUCATION:   The patients plan of care was discussed with: Physical therapist.     Fall prevention education was provided and the patient/caregiver indicated understanding., Patient/family have participated as able in goal setting and plan of care. , and Patient/family agree to work toward stated goals and plan of care.     Thank you for this referral.  Ibrahima Clear Katia   Time Calculation: 31 mins

## 2021-06-26 NOTE — DISCHARGE INSTRUCTIONS
Patient Education        Hypokalemia: Care Instructions  Your Care Instructions     Hypokalemia (say \"ga-hv-tvm-MATA-angélica-uh\") is a low level of potassium. The heart, muscles, kidneys, and nervous system all need potassium to work well. This problem has many different causes. Kidney problems, diet, and medicines like diuretics and laxatives can cause it. So can vomiting or diarrhea. In some cases, cancer is the cause. Your doctor may do tests to find the cause of your low potassium levels. You may need medicines to bring your potassium levels back to normal. You may also need regular blood tests to check your potassium. If you have very low potassium, you may need intravenous (IV) medicines. You also may need tests to check the electrical activity of your heart. Heart problems caused by low potassium levels can be very serious. Follow-up care is a key part of your treatment and safety. Be sure to make and go to all appointments, and call your doctor if you are having problems. It's also a good idea to know your test results and keep a list of the medicines you take. How can you care for yourself at home? · If your doctor recommends it, eat foods that have a lot of potassium. These include fresh fruits, juices, and vegetables. They also include nuts, beans, and milk. · Be safe with medicines. If your doctor prescribes medicines or potassium supplements, take them exactly as directed. Call your doctor if you have any problems with your medicines. · Get your potassium levels tested as often as your doctor tells you. When should you call for help? Call 911 anytime you think you may need emergency care. For example, call if:    · You feel like your heart is missing beats. Heart problems caused by low potassium can cause death.     · You passed out (lost consciousness).     · You have a seizure.    Call your doctor now or seek immediate medical care if:    · You feel weak or unusually tired.     · You have severe arm or leg cramps.     · You have tingling or numbness.     · You feel sick to your stomach, or you vomit.     · You have belly cramps.     · You feel bloated or constipated.     · You have to urinate a lot.     · You feel very thirsty most of the time.     · You are dizzy or lightheaded, or you feel like you may faint.     · You feel depressed, or you lose touch with reality. Watch closely for changes in your health, and be sure to contact your doctor if:    · You do not get better as expected. Where can you learn more? Go to http://www.gray.com/  Enter G358 in the search box to learn more about \"Hypokalemia: Care Instructions. \"  Current as of: March 31, 2020               Content Version: 12.8  © 7857-0444 [employer]. Care instructions adapted under license by [employer] (which disclaims liability or warranty for this information). If you have questions about a medical condition or this instruction, always ask your healthcare professional. [address] any warranty or liability for your use of this information.

## 2021-06-27 LAB
ALDOST SERPL-MCNC: NORMAL NG/DL
ALDOST/RENIN PLAS-RTO: NORMAL {RATIO}
BACTERIA SPEC CULT: ABNORMAL
COLONY COUNT,CNT: ABNORMAL
COLONY COUNT,CNT: ABNORMAL
RENIN PLAS-CCNC: NORMAL NG/ML/HR
SPECIAL REQUESTS,SREQ: ABNORMAL

## 2021-07-25 NOTE — DISCHARGE SUMMARY
.     Discharge Summary       PATIENT ID: Patsy Manzano  MRN: [id number]   YOB: 1960    DATE OF ADMISSION: 6/24/2021  2:02 PM    DATE OF DISCHARGE:   PRIMARY CARE PROVIDER: Pooja Draper MD     ATTENDING PHYSICIAN: Roselyn Rosenbaum  DISCHARGING PROVIDER: Moon Calabrese      CONSULTATIONS: None    PROCEDURES/SURGERIES: * No surgery found *    ADMITTING DIAGNOSES:    Patient Active Problem List    Diagnosis Date Noted    Generalized weakness 06/24/2021    Hypokalemia 11/04/2020    UTI (urinary tract infection) 11/04/2020       DISCHARGE DIAGNOSES / PLAN:      Hypokalemia-2.5  Intractable nausea   Metabolic acidosis   Acute kidney injury  Generalized weakness   COPD   History of hypokalemia   RLS  Hyperlipidemia  GERD        DISCHARGE MEDICATIONS:  Discharge Medication List as of 6/26/2021 12:10 PM      START taking these medications    Details   calcium carbonate (TUMS) 200 mg calcium (500 mg) chew Take 2 Tablets by mouth every four (4) hours as needed for Other (reflux). , Normal, Disp-30 Tablet, R-0      potassium citrate (UROCIT-K5) 5 mEq (540 mg) TbER tablet Take 3 Tablets by mouth three (3) times daily for 10 days. , Normal, Disp-60 Tablet, R-0      spironolactone (ALDACTONE) 25 mg tablet Take 0.5 Tablets by mouth two (2) times a day., Normal, Disp-60 Tablet, R-0         CONTINUE these medications which have NOT CHANGED    Details   ferrous sulfate 325 mg (65 mg iron) tablet Take 1 Tab by mouth Daily (before breakfast). , Normal, Disp-30 Tab,R-0      omeprazole (PriLOSEC OTC) 20 mg tablet Take 1 Tab by mouth daily. , Normal, Disp-30 Tab,R-0      PARoxetine (PAXIL) 40 mg tablet Take 1 Tab by mouth daily. , Normal, Disp-30 Tab,R-0      pregabalin (LYRICA) 150 mg capsule Take 1 Cap by mouth two (2) times a day. Max Daily Amount: 300 mg., Normal, Disp-60 Cap,R-0      rOPINIRole (REQUIP) 0.25 mg tablet Take 1 Tab by mouth three (3) times daily. , Normal, Disp-90 Tab,R-0         STOP taking these medications       meloxicam (MOBIC) 15 mg tablet Comments:   Reason for Stopping:         potassium chloride (K-DUR, KLOR-CON) 20 mEq tablet Comments:   Reason for Stopping:         sodium bicarbonate 650 mg tablet Comments:   Reason for Stopping:         levoFLOXacin (Levaquin) 500 mg tablet Comments:   Reason for Stopping:         methylPREDNISolone (MEDROL DOSEPACK) 4 mg tablet Comments:   Reason for Stopping:                 NOTIFY YOUR PHYSICIAN FOR ANY OF THE FOLLOWING:   Fever over 101 degrees for 24 hours. Chest pain, shortness of breath, fever, chills, nausea, vomiting, diarrhea, change in mentation, falling, weakness, bleeding. Severe pain or pain not relieved by medications. Or, any other signs or symptoms that you may have questions about. DISPOSITION:  x  Home With:   OT  PT  HH  RN       Long term SNF/Inpatient Rehab    Independent/assisted living    Hospice    Other:       PATIENT CONDITION AT DISCHARGE: Stable      PHYSICAL EXAMINATION AT DISCHARGE:  General:          Alert, cooperative, no distress, appears stated age. HEENT:           Atraumatic, anicteric sclerae, pink conjunctivae                          No oral ulcers, mucosa moist, throat clear, dentition fair  Neck:               Supple, symmetrical  Lungs:             Clear to auscultation bilaterally. No Wheezing or Rhonchi. No rales. Chest wall:      No tenderness  No Accessory muscle use. Heart:              Regular  rhythm,  No  murmur   No edema  Abdomen:        Soft, non-tender. Not distended. Bowel sounds normal  Extremities:     No cyanosis. No clubbing,                            Skin turgor normal, Capillary refill normal  Skin:                Not pale. Not Jaundiced  No rashes   Psych:             Not anxious or agitated.   Neurologic:      Alert, moves all extremities, answers questions appropriately and responds to commands     No orders to display        No results found for this or any previous visit (from the past 24 hour(s)). HOSPITAL COURSE:    Patient is a 64y.o. year old female with a past medical history of COPD, history of hypokalemia, GERD, RLS, hyperlipidemia, and chronic pain presents to the ED due to nausea, decreased appetite, and weakness for 1 week. Denies vomiting, chest pain, and SOB. She has had prior episodes like this when her potassium was low. She was last in the hospital in November for the same problem and was diagnosed with hypokalemia. In ER, potassium was 2.5, bicarb is 14, creatinine is 1.8.  Patient admitted for further evaluation and treatment    Patient was seen by the nephrology during this admission  Patient had aggressively treated for hypokalemia renal function significantly improved and metabolic acidosis also improved patient otherwise remained stable discharge home in stable condition      Signed:   Emma Valle MD  7/25/2021  3:46 PM

## 2021-09-25 ENCOUNTER — HOSPITAL ENCOUNTER (INPATIENT)
Age: 61
LOS: 1 days | Discharge: HOME | DRG: 683 | End: 2021-09-28
Attending: STUDENT IN AN ORGANIZED HEALTH CARE EDUCATION/TRAINING PROGRAM | Admitting: INTERNAL MEDICINE
Payer: COMMERCIAL

## 2021-09-25 DIAGNOSIS — E86.0 DEHYDRATION: ICD-10-CM

## 2021-09-25 DIAGNOSIS — E87.6 HYPOKALEMIA: ICD-10-CM

## 2021-09-25 DIAGNOSIS — N17.9 ACUTE RENAL FAILURE, UNSPECIFIED ACUTE RENAL FAILURE TYPE (HCC): Primary | ICD-10-CM

## 2021-09-25 LAB
ALBUMIN SERPL-MCNC: 4.1 G/DL (ref 3.5–5)
ALBUMIN/GLOB SERPL: 1.1 {RATIO} (ref 1.1–2.2)
ALP SERPL-CCNC: 70 U/L (ref 45–117)
ALT SERPL-CCNC: 12 U/L (ref 12–78)
ANION GAP SERPL CALC-SCNC: 15 MMOL/L (ref 5–15)
AST SERPL W P-5'-P-CCNC: 9 U/L (ref 15–37)
BASOPHILS # BLD: 0.1 K/UL (ref 0–0.1)
BASOPHILS NFR BLD: 1 % (ref 0–1)
BILIRUB SERPL-MCNC: 0.3 MG/DL (ref 0.2–1)
BUN SERPL-MCNC: 33 MG/DL (ref 6–20)
BUN/CREAT SERPL: 14 (ref 12–20)
CA-I BLD-MCNC: 10.3 MG/DL (ref 8.5–10.1)
CHLORIDE SERPL-SCNC: 110 MMOL/L (ref 97–108)
CO2 SERPL-SCNC: 13 MMOL/L (ref 21–32)
CREAT SERPL-MCNC: 2.38 MG/DL (ref 0.55–1.02)
DIFFERENTIAL METHOD BLD: ABNORMAL
EOSINOPHIL # BLD: 0.2 K/UL (ref 0–0.4)
EOSINOPHIL NFR BLD: 2 % (ref 0–7)
ERYTHROCYTE [DISTWIDTH] IN BLOOD BY AUTOMATED COUNT: 14.3 % (ref 11.5–14.5)
GLOBULIN SER CALC-MCNC: 3.8 G/DL (ref 2–4)
GLUCOSE SERPL-MCNC: 147 MG/DL (ref 65–100)
HCT VFR BLD AUTO: 43 % (ref 35–47)
HGB BLD-MCNC: 15.1 G/DL (ref 11.5–16)
IMM GRANULOCYTES # BLD AUTO: 0.1 K/UL (ref 0–0.04)
IMM GRANULOCYTES NFR BLD AUTO: 1 % (ref 0–0.5)
LYMPHOCYTES # BLD: 3 K/UL (ref 0.8–3.5)
LYMPHOCYTES NFR BLD: 22 % (ref 12–49)
MCH RBC QN AUTO: 30.7 PG (ref 26–34)
MCHC RBC AUTO-ENTMCNC: 35.1 G/DL (ref 30–36.5)
MCV RBC AUTO: 87.4 FL (ref 80–99)
MONOCYTES # BLD: 1 K/UL (ref 0–1)
MONOCYTES NFR BLD: 7 % (ref 5–13)
NEUTS SEG # BLD: 9.4 K/UL (ref 1.8–8)
NEUTS SEG NFR BLD: 67 % (ref 32–75)
NRBC # BLD: 0 K/UL (ref 0–0.01)
NRBC BLD-RTO: 0 PER 100 WBC
PLATELET # BLD AUTO: 400 K/UL (ref 150–400)
PMV BLD AUTO: 11.6 FL (ref 8.9–12.9)
POTASSIUM SERPL-SCNC: 2.6 MMOL/L (ref 3.5–5.1)
PROT SERPL-MCNC: 7.9 G/DL (ref 6.4–8.2)
RBC # BLD AUTO: 4.92 M/UL (ref 3.8–5.2)
SODIUM SERPL-SCNC: 138 MMOL/L (ref 136–145)
WBC # BLD AUTO: 13.8 K/UL (ref 3.6–11)

## 2021-09-25 PROCEDURE — 94761 N-INVAS EAR/PLS OXIMETRY MLT: CPT

## 2021-09-25 PROCEDURE — 74011250636 HC RX REV CODE- 250/636: Performed by: STUDENT IN AN ORGANIZED HEALTH CARE EDUCATION/TRAINING PROGRAM

## 2021-09-25 PROCEDURE — 93005 ELECTROCARDIOGRAM TRACING: CPT

## 2021-09-25 PROCEDURE — 99218 HC RM OBSERVATION: CPT

## 2021-09-25 PROCEDURE — 36415 COLL VENOUS BLD VENIPUNCTURE: CPT

## 2021-09-25 PROCEDURE — 99283 EMERGENCY DEPT VISIT LOW MDM: CPT

## 2021-09-25 PROCEDURE — 85025 COMPLETE CBC W/AUTO DIFF WBC: CPT

## 2021-09-25 PROCEDURE — 80053 COMPREHEN METABOLIC PANEL: CPT

## 2021-09-25 RX ORDER — SODIUM CHLORIDE 0.9 % (FLUSH) 0.9 %
5-40 SYRINGE (ML) INJECTION AS NEEDED
Status: DISCONTINUED | OUTPATIENT
Start: 2021-09-25 | End: 2021-09-28 | Stop reason: HOSPADM

## 2021-09-25 RX ORDER — SODIUM CHLORIDE 9 MG/ML
1000 INJECTION, SOLUTION INTRAVENOUS CONTINUOUS
Status: DISCONTINUED | OUTPATIENT
Start: 2021-09-25 | End: 2021-09-25

## 2021-09-25 RX ORDER — SODIUM CHLORIDE 0.9 % (FLUSH) 0.9 %
5-40 SYRINGE (ML) INJECTION EVERY 8 HOURS
Status: DISCONTINUED | OUTPATIENT
Start: 2021-09-25 | End: 2021-09-26

## 2021-09-25 RX ORDER — ONDANSETRON 2 MG/ML
4 INJECTION INTRAMUSCULAR; INTRAVENOUS
Status: DISCONTINUED | OUTPATIENT
Start: 2021-09-25 | End: 2021-09-28 | Stop reason: HOSPADM

## 2021-09-25 RX ORDER — SODIUM CHLORIDE AND POTASSIUM CHLORIDE .9; .15 G/100ML; G/100ML
125 SOLUTION INTRAVENOUS CONTINUOUS
Status: DISCONTINUED | OUTPATIENT
Start: 2021-09-25 | End: 2021-09-26

## 2021-09-25 RX ORDER — SODIUM CHLORIDE, SODIUM LACTATE, POTASSIUM CHLORIDE, CALCIUM CHLORIDE 600; 310; 30; 20 MG/100ML; MG/100ML; MG/100ML; MG/100ML
125 INJECTION, SOLUTION INTRAVENOUS CONTINUOUS
Status: DISCONTINUED | OUTPATIENT
Start: 2021-09-25 | End: 2021-09-26

## 2021-09-25 RX ORDER — ACETAMINOPHEN 650 MG/1
650 SUPPOSITORY RECTAL
Status: DISCONTINUED | OUTPATIENT
Start: 2021-09-25 | End: 2021-09-28 | Stop reason: HOSPADM

## 2021-09-25 RX ORDER — ACETAMINOPHEN 325 MG/1
650 TABLET ORAL
Status: DISCONTINUED | OUTPATIENT
Start: 2021-09-25 | End: 2021-09-28 | Stop reason: HOSPADM

## 2021-09-25 RX ORDER — ONDANSETRON 4 MG/1
4 TABLET, ORALLY DISINTEGRATING ORAL
Status: DISCONTINUED | OUTPATIENT
Start: 2021-09-25 | End: 2021-09-28 | Stop reason: HOSPADM

## 2021-09-25 RX ORDER — POTASSIUM CHLORIDE 750 MG/1
40 TABLET, FILM COATED, EXTENDED RELEASE ORAL
Status: DISCONTINUED | OUTPATIENT
Start: 2021-09-25 | End: 2021-09-25

## 2021-09-25 RX ADMIN — SODIUM CHLORIDE 1000 ML: 9 INJECTION, SOLUTION INTRAVENOUS at 20:49

## 2021-09-26 LAB
ALBUMIN SERPL-MCNC: 3.1 G/DL (ref 3.5–5)
ALBUMIN SERPL-MCNC: 3.2 G/DL (ref 3.5–5)
ALBUMIN/GLOB SERPL: 1.1 {RATIO} (ref 1.1–2.2)
ALP SERPL-CCNC: 54 U/L (ref 45–117)
ALT SERPL-CCNC: 11 U/L (ref 12–78)
ANION GAP SERPL CALC-SCNC: 10 MMOL/L (ref 5–15)
ANION GAP SERPL CALC-SCNC: 9 MMOL/L (ref 5–15)
AST SERPL W P-5'-P-CCNC: 11 U/L (ref 15–37)
BASOPHILS # BLD: 0.1 K/UL (ref 0–0.1)
BASOPHILS NFR BLD: 1 % (ref 0–1)
BILIRUB SERPL-MCNC: 0.4 MG/DL (ref 0.2–1)
BUN SERPL-MCNC: 27 MG/DL (ref 6–20)
BUN SERPL-MCNC: 27 MG/DL (ref 6–20)
BUN/CREAT SERPL: 16 (ref 12–20)
BUN/CREAT SERPL: 16 (ref 12–20)
CA-I BLD-MCNC: 9 MG/DL (ref 8.5–10.1)
CA-I BLD-MCNC: 9 MG/DL (ref 8.5–10.1)
CHLORIDE SERPL-SCNC: 113 MMOL/L (ref 97–108)
CHLORIDE SERPL-SCNC: 114 MMOL/L (ref 97–108)
CO2 SERPL-SCNC: 17 MMOL/L (ref 21–32)
CO2 SERPL-SCNC: 17 MMOL/L (ref 21–32)
CREAT SERPL-MCNC: 1.64 MG/DL (ref 0.55–1.02)
CREAT SERPL-MCNC: 1.65 MG/DL (ref 0.55–1.02)
DIFFERENTIAL METHOD BLD: ABNORMAL
EOSINOPHIL # BLD: 0.3 K/UL (ref 0–0.4)
EOSINOPHIL NFR BLD: 3 % (ref 0–7)
ERYTHROCYTE [DISTWIDTH] IN BLOOD BY AUTOMATED COUNT: 13.9 % (ref 11.5–14.5)
GLOBULIN SER CALC-MCNC: 2.9 G/DL (ref 2–4)
GLUCOSE SERPL-MCNC: 98 MG/DL (ref 65–100)
GLUCOSE SERPL-MCNC: 98 MG/DL (ref 65–100)
HCT VFR BLD AUTO: 32.5 % (ref 35–47)
HGB BLD-MCNC: 11.5 G/DL (ref 11.5–16)
IMM GRANULOCYTES # BLD AUTO: 0.1 K/UL (ref 0–0.04)
IMM GRANULOCYTES NFR BLD AUTO: 1 % (ref 0–0.5)
LYMPHOCYTES # BLD: 3.1 K/UL (ref 0.8–3.5)
LYMPHOCYTES NFR BLD: 28 % (ref 12–49)
MCH RBC QN AUTO: 30.4 PG (ref 26–34)
MCHC RBC AUTO-ENTMCNC: 35.4 G/DL (ref 30–36.5)
MCV RBC AUTO: 86 FL (ref 80–99)
MONOCYTES # BLD: 1.1 K/UL (ref 0–1)
MONOCYTES NFR BLD: 10 % (ref 5–13)
NEUTS SEG # BLD: 6.4 K/UL (ref 1.8–8)
NEUTS SEG NFR BLD: 57 % (ref 32–75)
NRBC # BLD: 0 K/UL (ref 0–0.01)
NRBC BLD-RTO: 0 PER 100 WBC
PHOSPHATE SERPL-MCNC: 1.9 MG/DL (ref 2.6–4.7)
PLATELET # BLD AUTO: 309 K/UL (ref 150–400)
PMV BLD AUTO: 12.2 FL (ref 8.9–12.9)
POTASSIUM SERPL-SCNC: 2.7 MMOL/L (ref 3.5–5.1)
POTASSIUM SERPL-SCNC: 2.7 MMOL/L (ref 3.5–5.1)
PROT SERPL-MCNC: 6 G/DL (ref 6.4–8.2)
RBC # BLD AUTO: 3.78 M/UL (ref 3.8–5.2)
SODIUM SERPL-SCNC: 140 MMOL/L (ref 136–145)
SODIUM SERPL-SCNC: 140 MMOL/L (ref 136–145)
WBC # BLD AUTO: 11 K/UL (ref 3.6–11)

## 2021-09-26 PROCEDURE — 96374 THER/PROPH/DIAG INJ IV PUSH: CPT

## 2021-09-26 PROCEDURE — 85025 COMPLETE CBC W/AUTO DIFF WBC: CPT

## 2021-09-26 PROCEDURE — 74011250637 HC RX REV CODE- 250/637: Performed by: HOSPITALIST

## 2021-09-26 PROCEDURE — 96361 HYDRATE IV INFUSION ADD-ON: CPT

## 2021-09-26 PROCEDURE — 74011250637 HC RX REV CODE- 250/637: Performed by: STUDENT IN AN ORGANIZED HEALTH CARE EDUCATION/TRAINING PROGRAM

## 2021-09-26 PROCEDURE — 96375 TX/PRO/DX INJ NEW DRUG ADDON: CPT

## 2021-09-26 PROCEDURE — 74011000250 HC RX REV CODE- 250: Performed by: INTERNAL MEDICINE

## 2021-09-26 PROCEDURE — 80053 COMPREHEN METABOLIC PANEL: CPT

## 2021-09-26 PROCEDURE — 36415 COLL VENOUS BLD VENIPUNCTURE: CPT

## 2021-09-26 PROCEDURE — 74011250637 HC RX REV CODE- 250/637: Performed by: INTERNAL MEDICINE

## 2021-09-26 PROCEDURE — 99218 HC RM OBSERVATION: CPT

## 2021-09-26 PROCEDURE — 74011250636 HC RX REV CODE- 250/636: Performed by: HOSPITALIST

## 2021-09-26 PROCEDURE — 80069 RENAL FUNCTION PANEL: CPT

## 2021-09-26 RX ORDER — POTASSIUM CHLORIDE 20 MEQ/1
20 TABLET, EXTENDED RELEASE ORAL
Status: COMPLETED | OUTPATIENT
Start: 2021-09-26 | End: 2021-09-26

## 2021-09-26 RX ORDER — SPIRONOLACTONE 25 MG/1
12.5 TABLET ORAL 2 TIMES DAILY
Status: DISCONTINUED | OUTPATIENT
Start: 2021-09-26 | End: 2021-09-26

## 2021-09-26 RX ORDER — POTASSIUM CITRATE 5 MEQ/1
10 TABLET, EXTENDED RELEASE ORAL 3 TIMES DAILY
Status: DISCONTINUED | OUTPATIENT
Start: 2021-09-26 | End: 2021-09-28 | Stop reason: HOSPADM

## 2021-09-26 RX ORDER — SODIUM CHLORIDE AND POTASSIUM CHLORIDE .9; .15 G/100ML; G/100ML
SOLUTION INTRAVENOUS CONTINUOUS
Status: DISCONTINUED | OUTPATIENT
Start: 2021-09-26 | End: 2021-09-26

## 2021-09-26 RX ORDER — POTASSIUM CHLORIDE 20 MEQ/1
40 TABLET, EXTENDED RELEASE ORAL
Status: COMPLETED | OUTPATIENT
Start: 2021-09-26 | End: 2021-09-26

## 2021-09-26 RX ORDER — PAROXETINE 10 MG/1
40 TABLET, FILM COATED ORAL DAILY
Status: DISCONTINUED | OUTPATIENT
Start: 2021-09-26 | End: 2021-09-28 | Stop reason: HOSPADM

## 2021-09-26 RX ORDER — PREGABALIN 150 MG/1
150 CAPSULE ORAL 2 TIMES DAILY
Status: DISCONTINUED | OUTPATIENT
Start: 2021-09-26 | End: 2021-09-28 | Stop reason: HOSPADM

## 2021-09-26 RX ORDER — TRISODIUM CITRATE DIHYDRATE AND CITRIC ACID MONOHYDRATE 500; 334 MG/5ML; MG/5ML
15 SOLUTION ORAL 3 TIMES DAILY
Status: DISCONTINUED | OUTPATIENT
Start: 2021-09-26 | End: 2021-09-26

## 2021-09-26 RX ORDER — SODIUM BICARBONATE 650 MG/1
650 TABLET ORAL 2 TIMES DAILY
Status: DISCONTINUED | OUTPATIENT
Start: 2021-09-26 | End: 2021-09-28 | Stop reason: HOSPADM

## 2021-09-26 RX ORDER — ROPINIROLE 0.25 MG/1
0.25 TABLET, FILM COATED ORAL 3 TIMES DAILY
Status: DISCONTINUED | OUTPATIENT
Start: 2021-09-26 | End: 2021-09-28 | Stop reason: HOSPADM

## 2021-09-26 RX ADMIN — POTASSIUM BICARBONATE 25 MEQ: 978 TABLET, EFFERVESCENT ORAL at 04:08

## 2021-09-26 RX ADMIN — SODIUM BICARBONATE: 84 INJECTION, SOLUTION INTRAVENOUS at 16:02

## 2021-09-26 RX ADMIN — SODIUM CITRATE AND CITRIC ACID MONOHYDRATE 15 ML: 500; 334 SOLUTION ORAL at 09:43

## 2021-09-26 RX ADMIN — Medication 10 ML: at 01:03

## 2021-09-26 RX ADMIN — ROPINIROLE HYDROCHLORIDE 0.25 MG: 0.25 TABLET, FILM COATED ORAL at 21:09

## 2021-09-26 RX ADMIN — SODIUM CHLORIDE, POTASSIUM CHLORIDE, SODIUM LACTATE AND CALCIUM CHLORIDE 125 ML/HR: 600; 310; 30; 20 INJECTION, SOLUTION INTRAVENOUS at 01:39

## 2021-09-26 RX ADMIN — SODIUM CHLORIDE, POTASSIUM CHLORIDE, SODIUM LACTATE AND CALCIUM CHLORIDE 1000 ML: 600; 310; 30; 20 INJECTION, SOLUTION INTRAVENOUS at 00:58

## 2021-09-26 RX ADMIN — SODIUM BICARBONATE 650 MG: 650 TABLET ORAL at 21:09

## 2021-09-26 RX ADMIN — POTASSIUM CITRATE 10 MEQ: 5 TABLET ORAL at 16:00

## 2021-09-26 RX ADMIN — POTASSIUM CHLORIDE AND SODIUM CHLORIDE 1000 ML: 900; 150 INJECTION, SOLUTION INTRAVENOUS at 01:35

## 2021-09-26 RX ADMIN — SPIRONOLACTONE 12.5 MG: 25 TABLET, FILM COATED ORAL at 09:44

## 2021-09-26 RX ADMIN — POTASSIUM CITRATE 10 MEQ: 5 TABLET ORAL at 22:00

## 2021-09-26 RX ADMIN — ONDANSETRON 4 MG: 2 INJECTION INTRAMUSCULAR; INTRAVENOUS at 13:48

## 2021-09-26 RX ADMIN — POTASSIUM CHLORIDE 40 MEQ: 1500 TABLET, EXTENDED RELEASE ORAL at 21:09

## 2021-09-26 RX ADMIN — ONDANSETRON 4 MG: 4 TABLET, ORALLY DISINTEGRATING ORAL at 01:43

## 2021-09-26 RX ADMIN — PAROXETINE HYDROCHLORIDE 40 MG: 10 TABLET, FILM COATED ORAL at 23:08

## 2021-09-26 RX ADMIN — ROPINIROLE HYDROCHLORIDE 0.25 MG: 0.25 TABLET, FILM COATED ORAL at 16:02

## 2021-09-26 RX ADMIN — POTASSIUM BICARBONATE 50 MEQ: 978 TABLET, EFFERVESCENT ORAL at 00:57

## 2021-09-26 RX ADMIN — PREGABALIN 150 MG: 150 CAPSULE ORAL at 21:09

## 2021-09-26 RX ADMIN — ACETAMINOPHEN 650 MG: 325 TABLET ORAL at 09:43

## 2021-09-26 RX ADMIN — POTASSIUM CHLORIDE 40 MEQ: 1500 TABLET, EXTENDED RELEASE ORAL at 18:09

## 2021-09-26 RX ADMIN — ROPINIROLE HYDROCHLORIDE 0.25 MG: 0.25 TABLET, FILM COATED ORAL at 09:43

## 2021-09-26 RX ADMIN — POTASSIUM CHLORIDE 20 MEQ: 1500 TABLET, EXTENDED RELEASE ORAL at 13:48

## 2021-09-26 RX ADMIN — POTASSIUM CHLORIDE 40 MEQ: 1500 TABLET, EXTENDED RELEASE ORAL at 16:02

## 2021-09-26 RX ADMIN — PREGABALIN 150 MG: 150 CAPSULE ORAL at 09:43

## 2021-09-26 NOTE — ROUTINE PROCESS
TRANSFER - OUT REPORT:    Verbal report given to maria e (name) on [address]  being transferred to [place] [address] (unit) for routine progression of care       Report consisted of patients Situation, Background, Assessment and   Recommendations(SBAR). Information from the following report(s) SBAR, ED Summary, MAR, Recent Results and Med Rec Status was reviewed with the receiving nurse. Lines:   Peripheral IV 09/25/21 Anterior;Proximal;Right Forearm (Active)   Site Assessment Clean, dry, & intact 09/25/21 1839   Phlebitis Assessment 0 09/25/21 1839   Infiltration Assessment 0 09/25/21 1839   Dressing Status Clean, dry, & intact 09/25/21 1839   Dressing Type Transparent 09/25/21 1839   Hub Color/Line Status Pink 09/25/21 1839       Peripheral IV 09/26/21 Left Antecubital (Active)        Opportunity for questions and clarification was provided.       Patient transported with:   [facility]

## 2021-09-26 NOTE — H&P
History and Physical              Subjective :   Chief Complaint : Generalized weakness with decreased appetite. Think likely secondary to her kidney issues. Source of information : Patient, ED provider, previous records. History of present illness:   57-year-old female with a recent diagnosis of renal tubular acidosis type I with severe hypokalemia presented to the emergency room again today complaining of generalized weakness with no energy to do anything. Decreased appetite, and severe fatigue. When she is trying to get up symptoms are more worse. Denies any exacerbating relieving factors. States taking medications regularly. Recently appetite is poor. On recent admission she is diagnosed with renal tubular acidosis type I likely secondary to chronic use of NSAIDs that she stopped. Unable to get information about duration of this condition with the patient. Past Medical History:   Diagnosis Date    Chronic obstructive pulmonary disease (HCC)     Chronic pain     neck, slipped disc    Ectopic pregnancy     GERD (gastroesophageal reflux disease)     Heart murmur     History of low potassium     Hypokalemia     Ill-defined condition     murmur    Psychiatric disorder     depression     Past Surgical History:   Procedure Laterality Date    HX APPENDECTOMY      HX OTHER SURGICAL      needle removal left arm     Family History   Family history unknown: Yes      Social History     Tobacco Use    Smoking status: Current Every Day Smoker     Packs/day: 0.25    Smokeless tobacco: Never Used   Substance Use Topics    Alcohol use: Never       Prior to Admission medications    Medication Sig Start Date End Date Taking? Authorizing Provider   calcium carbonate (TUMS) 200 mg calcium (500 mg) chew Take 2 Tablets by mouth every four (4) hours as needed for Other (reflux). 6/26/21   Ta DELGADO MD   spironolactone (ALDACTONE) 25 mg tablet Take 0.5 Tablets by mouth two (2) times a day. 6/26/21   Maribell DELGADO MD   ferrous sulfate 325 mg (65 mg iron) tablet Take 1 Tab by mouth Daily (before breakfast). 11/9/20   Jaspreet London MD   omeprazole (PriLOSEC OTC) 20 mg tablet Take 1 Tab by mouth daily. 11/8/20   Jaspreet London MD   PARoxetine (PAXIL) 40 mg tablet Take 1 Tab by mouth daily. 11/8/20   Jaspreet London MD   pregabalin (LYRICA) 150 mg capsule Take 1 Cap by mouth two (2) times a day. Max Daily Amount: 300 mg. 11/8/20   Jaspreet London MD   rOPINIRole (REQUIP) 0.25 mg tablet Take 1 Tab by mouth three (3) times daily. 11/8/20   Jaspreet London MD     No Known Allergies          Review of Systems:  Constitutional: Appetite is poor. Denies weight loss, no fever, no chills, no night sweats. Complains severe fatigue. Eye: No recent visual disturbances, no discharge, no double vision. Ear/nose/mouth/throat : No hearing disturbance, no ear pain, no nasal congestion, no sore throat, no trouble swallowing. Respiratory : No trouble breathing, no cough, ++  shortness of breath with exertion, no hemoptysis, no wheezing. Cardiovascular : No chest pain, no palpitation, no orthopnea,  no peripheral edema. Gastrointestinal : No nausea, no vomiting, no diarrhea, No constipation, No heartburn, No abdominal pain. Genitourinary : No dysuria, no hematuria,   Lymphatics : No swollen glands -Neck, axillary, inguinal.  Endocrine : No excessive thirst, No polyuria   Immunologic :, No seasonal allergies. Musculoskeletal : No joint swelling, No pain, No effusion,  No back pain, No neck pain. Integumentary : No rash, No pruritus, No ecchymosis. Hematology : No petechiae, No easy bruising,  No tendency to bleed easy. Neurology : Denies change in mental status, No abnormal balance, No headache, No confusion, No numbness or tingling. Psychiatric : No mood swings, No anxiety, No depression.     Vitals:     Patient Vitals for the past 12 hrs:   Temp Pulse Resp BP SpO2   09/25/21 1801 98.3 °F (36.8 °C) 91 16 91/65 96 %       Physical Exam:   General : Looks comfortable, no respiratory distress noted. HEENT : PERRLA, normal oral mucosa, atraumatic normocephalic, Normal ear and nose. Neck : Supple, no JVD, no masses noted, no carotid bruit. Lungs : Breath sounds with good air entry bilaterally, no wheezes or rales, no accessory muscle use. CVS : Rhythm rate regular, S1+, S2+, no murmur or gallop. Abdomen : Soft, nontender, no organomegaly, bowel sounds active. Extremities : No edema noted,  pedal pulses palpable. Musculoskeletal : Fair range of motion, no joint swelling or effusion, muscle tone and power appears normal.   Skin : Dry, poor skin turgor. No pathological rash. Lymphatic : No cervical lymphadenopathy. Neurological : Awake, alert, oriented to time place person. Psychiatric : Mood and affect appears appropriate to the situation. Data Review:   Recent Results (from the past 24 hour(s))   CBC WITH AUTOMATED DIFF    Collection Time: 09/25/21  6:07 PM   Result Value Ref Range    WBC 13.8 (H) 3.6 - 11.0 K/uL    RBC 4.92 3.80 - 5.20 M/uL    HGB 15.1 11.5 - 16.0 g/dL    HCT 43.0 35.0 - 47.0 %    MCV 87.4 80.0 - 99.0 FL    MCH 30.7 26.0 - 34.0 PG    MCHC 35.1 30.0 - 36.5 g/dL    RDW 14.3 11.5 - 14.5 %    PLATELET 943 593 - 493 K/uL    MPV 11.6 8.9 - 12.9 FL    NRBC 0.0 0.0  WBC    ABSOLUTE NRBC 0.00 0.00 - 0.01 K/uL    NEUTROPHILS 67 32 - 75 %    LYMPHOCYTES 22 12 - 49 %    MONOCYTES 7 5 - 13 %    EOSINOPHILS 2 0 - 7 %    BASOPHILS 1 0 - 1 %    IMMATURE GRANULOCYTES 1 (H) 0 - 0.5 %    ABS. NEUTROPHILS 9.4 (H) 1.8 - 8.0 K/UL    ABS. LYMPHOCYTES 3.0 0.8 - 3.5 K/UL    ABS. MONOCYTES 1.0 0.0 - 1.0 K/UL    ABS. EOSINOPHILS 0.2 0.0 - 0.4 K/UL    ABS. BASOPHILS 0.1 0.0 - 0.1 K/UL    ABS. IMM.  GRANS. 0.1 (H) 0.00 - 0.04 K/UL    DF AUTOMATED     METABOLIC PANEL, COMPREHENSIVE    Collection Time: 09/25/21  6:07 PM   Result Value Ref Range Sodium 138 136 - 145 mmol/L    Potassium 2.6 (LL) 3.5 - 5.1 mmol/L    Chloride 110 (H) 97 - 108 mmol/L    CO2 13 (LL) 21 - 32 mmol/L    Anion gap 15 5 - 15 mmol/L    Glucose 147 (H) 65 - 100 mg/dL    BUN 33 (H) 6 - 20 mg/dL    Creatinine 2.38 (H) 0.55 - 1.02 mg/dL    BUN/Creatinine ratio 14 12 - 20      GFR est AA 25 (L) >60 ml/min/1.73m2    GFR est non-AA 21 (L) >60 ml/min/1.73m2    Calcium 10.3 (H) 8.5 - 10.1 mg/dL    Bilirubin, total 0.3 0.2 - 1.0 mg/dL    AST (SGOT) 9 (L) 15 - 37 U/L    ALT (SGPT) 12 12 - 78 U/L    Alk. phosphatase 70 45 - 117 U/L    Protein, total 7.9 6.4 - 8.2 g/dL    Albumin 4.1 3.5 - 5.0 g/dL    Globulin 3.8 2.0 - 4.0 g/dL    A-G Ratio 1.1 1.1 - 2.2         Radiologic Studies : None          Assessment and Plan :     Acute renal failure: Likely secondary to poor oral intake versus polyuria. Started on IV fluids and will monitor renal functions    Severe hypokalemia with metabolic acidosis: Started on oral potassium supplements along with supplementation IV. Due to metabolic acidosis requested for potassium bicarbonate. We will follow up on the results    Renal tubular acidosis distal type I : May need further evaluation outpatient and follow-up patient already has appointment with nephrology    Anxiety with depression on treatment. Need to check the medications if they have any side effect profile at this. Chronic obstructive pulmonary disease with no exacerbation not on any medications stable    Admitted to medical floor, full CODE STATUS, home medications unable to verify what exactly she is taking in doses. No DVT prophylaxis as she is ambulatory. CC : Ofelia Grubbs MD  Signed By: Chanel Sommer MD     September 25, 2021      This dictation was done by dragon, computer voice recognition software. Often unanticipated grammatical, syntax, [employer] phones and other interpretive errors are inadvertently transcribed.   Please excuse errors that have escaped final proofreading.

## 2021-09-26 NOTE — ED PROVIDER NOTES
EMERGENCY DEPARTMENT HISTORY AND PHYSICAL EXAM      Date: 9/25/2021  Patient Name: Hima Choudhury      History of Presenting Illness     Chief Complaint   Patient presents with    Fatigue       History Provided By: Patient    HPI: Hima Choudhury, 64 y.o. female presents with lethargy and fatigue. Symptoms progressive over the last 10 days, nothing specific makes it better or worse, no associated abdominal pain, chest pain, nausea, vomiting, diarrhea, or decreased p.o. intake. There are no other complaints, changes, or physical findings at this time.     PCP: Pancho Watson MD    Current Facility-Administered Medications   Medication Dose Route Frequency Provider Last Rate Last Admin    sodium chloride (NS) flush 5-40 mL  5-40 mL IntraVENous Q8H Aaliyah Metz MD   10 mL at 09/26/21 0103    sodium chloride (NS) flush 5-40 mL  5-40 mL IntraVENous PRN Aaliyah Metz MD        acetaminophen (TYLENOL) tablet 650 mg  650 mg Oral Q6H PRN Aaliyah Metz MD        Or   [facility] acetaminophen (TYLENOL) suppository 650 mg  650 mg Rectal Q6H PRN Aaliyah Metz MD        ondansetron (ZOFRAN ODT) tablet 4 mg  4 mg Oral Q6H PRN Aaliyah Metz MD        Or    ondansetron (ZOFRAN) injection 4 mg  4 mg IntraVENous Q6H PRN Aaliyah Metz MD        0.9% sodium chloride with KCl 20 mEq/L infusion 125 mL  125 mL IntraVENous CONTINUOUS Aaliyah Metz MD        lactated ringers bolus infusion 1,000 mL  1,000 mL IntraVENous Latonia Fajardo MD 1,000 mL/hr at 09/26/21 0058 1,000 mL at 09/26/21 0058    potassium bicarbonate (KLYTE) tablet 25 mEq  25 mEq Oral Latonia Fajardo MD        potassium bicarbonate (KLYTE) tablet 25 mEq  25 mEq Oral TID Aaliyah Metz MD        lactated Ringers infusion  125 mL/hr IntraVENous CONTINUOUS Aaliyah Metz MD         Current Outpatient Medications   Medication Sig Dispense Refill    calcium carbonate (TUMS) 200 mg calcium (500 mg) chew Take 2 Tablets by mouth every four (4) hours as needed for Other (reflux). 30 Tablet 0    spironolactone (ALDACTONE) 25 mg tablet Take 0.5 Tablets by mouth two (2) times a day. 60 Tablet 0    ferrous sulfate 325 mg (65 mg iron) tablet Take 1 Tab by mouth Daily (before breakfast). 30 Tab 0    omeprazole (PriLOSEC OTC) 20 mg tablet Take 1 Tab by mouth daily. 30 Tab 0    PARoxetine (PAXIL) 40 mg tablet Take 1 Tab by mouth daily. 30 Tab 0    pregabalin (LYRICA) 150 mg capsule Take 1 Cap by mouth two (2) times a day. Max Daily Amount: 300 mg. 60 Cap 0    rOPINIRole (REQUIP) 0.25 mg tablet Take 1 Tab by mouth three (3) times daily. 80 Tab 0       Past History     Past Medical History:  Past Medical History:   Diagnosis Date    Chronic obstructive pulmonary disease (HCC)     Chronic pain     neck, slipped disc    Ectopic pregnancy     GERD (gastroesophageal reflux disease)     Heart murmur     History of low potassium     Hypokalemia     Ill-defined condition     murmur    Psychiatric disorder     depression       Past Surgical History:  Past Surgical History:   Procedure Laterality Date    HX APPENDECTOMY      HX OTHER SURGICAL      needle removal left arm       Family History:  Family History   Family history unknown: Yes       Social History:  Social History     Tobacco Use    Smoking status: Current Every Day Smoker     Packs/day: 0.25    Smokeless tobacco: Never Used   Substance Use Topics    Alcohol use: Never    Drug use: Never       Allergies:  No Known Allergies      Review of Systems     Review of Systems   Constitutional: Positive for fatigue. Negative for activity change, chills and fever. HENT: Negative for congestion and facial swelling. Eyes: Negative for discharge and visual disturbance. Respiratory: Negative for chest tightness and shortness of breath. Cardiovascular: Negative for chest pain and leg swelling.    Gastrointestinal: Negative for abdominal pain, diarrhea and vomiting. Genitourinary: Negative for dysuria and flank pain. Musculoskeletal: Negative for back pain and gait problem. Skin: Negative for rash and wound. Neurological: Negative for dizziness, weakness and headaches. Physical Exam     Physical Exam  Constitutional:       General: She is not in acute distress. Appearance: She is obese. HENT:      Head: Normocephalic and atraumatic. Mouth/Throat:      Mouth: Mucous membranes are dry. Pharynx: Oropharynx is clear. Eyes:      Extraocular Movements: Extraocular movements intact. Conjunctiva/sclera: Conjunctivae normal.   Cardiovascular:      Rate and Rhythm: Normal rate and regular rhythm. Pulmonary:      Effort: Pulmonary effort is normal.      Breath sounds: Normal breath sounds. Abdominal:      General: Abdomen is flat. Palpations: Abdomen is soft. Tenderness: There is no abdominal tenderness. Musculoskeletal:         General: No tenderness or deformity. Cervical back: Normal range of motion and neck supple. Skin:     General: Skin is warm and dry. Neurological:      General: No focal deficit present. Mental Status: She is alert and oriented to person, place, and time. Lab and Diagnostic Study Results     Labs -     Recent Results (from the past 12 hour(s))   CBC WITH AUTOMATED DIFF    Collection Time: 09/25/21  6:07 PM   Result Value Ref Range    WBC 13.8 (H) 3.6 - 11.0 K/uL    RBC 4.92 3.80 - 5.20 M/uL    HGB 15.1 11.5 - 16.0 g/dL    HCT 43.0 35.0 - 47.0 %    MCV 87.4 80.0 - 99.0 FL    MCH 30.7 26.0 - 34.0 PG    MCHC 35.1 30.0 - 36.5 g/dL    RDW 14.3 11.5 - 14.5 %    PLATELET 806 963 - 271 K/uL    MPV 11.6 8.9 - 12.9 FL    NRBC 0.0 0.0  WBC    ABSOLUTE NRBC 0.00 0.00 - 0.01 K/uL    NEUTROPHILS 67 32 - 75 %    LYMPHOCYTES 22 12 - 49 %    MONOCYTES 7 5 - 13 %    EOSINOPHILS 2 0 - 7 %    BASOPHILS 1 0 - 1 %    IMMATURE GRANULOCYTES 1 (H) 0 - 0.5 %    ABS. NEUTROPHILS 9.4 (H) 1.8 - 8.0 K/UL    ABS. LYMPHOCYTES 3.0 0.8 - 3.5 K/UL    ABS. MONOCYTES 1.0 0.0 - 1.0 K/UL    ABS. EOSINOPHILS 0.2 0.0 - 0.4 K/UL    ABS. BASOPHILS 0.1 0.0 - 0.1 K/UL    ABS. IMM. GRANS. 0.1 (H) 0.00 - 0.04 K/UL    DF AUTOMATED     METABOLIC PANEL, COMPREHENSIVE    Collection Time: 09/25/21  6:07 PM   Result Value Ref Range    Sodium 138 136 - 145 mmol/L    Potassium 2.6 (LL) 3.5 - 5.1 mmol/L    Chloride 110 (H) 97 - 108 mmol/L    CO2 13 (LL) 21 - 32 mmol/L    Anion gap 15 5 - 15 mmol/L    Glucose 147 (H) 65 - 100 mg/dL    BUN 33 (H) 6 - 20 mg/dL    Creatinine 2.38 (H) 0.55 - 1.02 mg/dL    BUN/Creatinine ratio 14 12 - 20      GFR est AA 25 (L) >60 ml/min/1.73m2    GFR est non-AA 21 (L) >60 ml/min/1.73m2    Calcium 10.3 (H) 8.5 - 10.1 mg/dL    Bilirubin, total 0.3 0.2 - 1.0 mg/dL    AST (SGOT) 9 (L) 15 - 37 U/L    ALT (SGPT) 12 12 - 78 U/L    Alk. phosphatase 70 45 - 117 U/L    Protein, total 7.9 6.4 - 8.2 g/dL    Albumin 4.1 3.5 - 5.0 g/dL    Globulin 3.8 2.0 - 4.0 g/dL    A-G Ratio 1.1 1.1 - 2.2         Radiologic Studies -   [unfilled]  CT Results  (Last 48 hours)    None        CXR Results  (Last 48 hours)    None          Medical Decision Making and ED Course   - I am the first and primary provider for this patient AND AM THE PRIMARY PROVIDER OF RECORD. - I reviewed the vital signs, available nursing notes, past medical history, past surgical history, family history and social history. - Initial assessment performed. The patients presenting problems have been discussed, and the staff are in agreement with the care plan formulated and outlined with them. I have encouraged them to ask questions as they arise throughout their visit. Vital Signs-Reviewed the patient's vital signs.     Patient Vitals for the past 12 hrs:   Temp Pulse Resp BP SpO2   09/25/21 1801 98.3 °F (36.8 °C) 91 16 91/65 96 %         Records Reviewed: Nursing Notes    Provider Notes (Medical Decision Making): 62-year-old male presenting with lethargy. She does have some signs of dehydration. Differential diagnosis could be secondary to electrolyte derangement, viral illness, kidney or hepatic failure. Patient does not have any additional symptoms. Thus, will get CBC and chemistry to risk stratify this patient. ED Course:   I reviewed the patient work-up. She does have hypokalemia with an acute kidney injury. Will admit the patient to the hospital for further evaluation and rehydration. Disposition     Disposition: Condition stable  Admitted to Floor Medical Floor the case was discussed with the admitting physician       Diagnosis     Clinical Impression:   1. Acute renal failure, unspecified acute renal failure type ([address]    2. Hypokalemia    3. Dehydration        Attestations: Chandrika Root MD    Please note that this dictation was completed with [employer], the computer voice recognition software. Quite often unanticipated grammatical, syntax, homophones, and other interpretive errors are inadvertently transcribed by the computer software. Please disregard these errors. Please excuse any errors that have escaped final proofreading. Thank you.

## 2021-09-26 NOTE — CONSULTS
Renal Consultation Note    NAME:  Edmundo Lennox   :   1960   MRN:   [id number]     ATTENDING: Kelly Alfaro MD  PCP:  Larina Hammans, MD    Date/Time:  2021 2:01 PM      Subjective:   REQUESTING PHYSICIAN:  REASON FOR CONSULT:        Patient is a very pleasant 62-year-old  female with past history of renal tubular acidosis type I based on her work-up during previous admission, chronic hypokalemia who presented to the hospital with a 3-week history of feeling unwell. She describes that for the past 2 to 3 weeks she has been having nausea vomiting a poor appetite. She was then unable to keep any of her medications down including her potassium citrate. She follows with Dr. Maria C Hicks outpatient for RTA, and was supposed to have follow-up with her labs done on September 15 but she could not as she was feeling unwell. On admission she had very abnormal labs including a blood pressure of 91/65 WBC count of 13.8 potassium 2.6 CO2 13 and a creatinine of 2.3. Her renal function was normal during previous admission. Patient seen in the room. She reports feeling slightly better today though still nauseated. She ate her breakfast but then threw up a few hours later after she was given Bicitra.       Past Medical History:   Diagnosis Date    Chronic obstructive pulmonary disease (HCC)     Chronic pain     neck, slipped disc    Ectopic pregnancy     GERD (gastroesophageal reflux disease)     Heart murmur     History of low potassium     Hypokalemia     Ill-defined condition     murmur    Psychiatric disorder     depression      Past Surgical History:   Procedure Laterality Date    HX APPENDECTOMY      HX OTHER SURGICAL      needle removal left arm     Social History     Tobacco Use    Smoking status: Current Every Day Smoker     Packs/day: 0.25    Smokeless tobacco: Never Used   Substance Use Topics    Alcohol use: Never      Family History   Family history unknown: Yes       No Known Allergies   Prior to Admission medications    Medication Sig Start Date End Date Taking? Authorizing Provider   calcium carbonate (TUMS) 200 mg calcium (500 mg) chew Take 2 Tablets by mouth every four (4) hours as needed for Other (reflux). 21   Hollis DELGADO MD   spironolactone (ALDACTONE) 25 mg tablet Take 0.5 Tablets by mouth two (2) times a day. 21   Hollis DELGADO MD   ferrous sulfate 325 mg (65 mg iron) tablet Take 1 Tab by mouth Daily (before breakfast). 20   Juana Rhoades MD   omeprazole (PriLOSEC OTC) 20 mg tablet Take 1 Tab by mouth daily. 20   Juana Rhoades MD   PARoxetine (PAXIL) 40 mg tablet Take 1 Tab by mouth daily. 20   Juana Rhoades MD   pregabalin (LYRICA) 150 mg capsule Take 1 Cap by mouth two (2) times a day. Max Daily Amount: 300 mg. 20   Juana Rhoades MD   rOPINIRole (REQUIP) 0.25 mg tablet Take 1 Tab by mouth three (3) times daily. 20   Juana Rhoades MD       REVIEW OF SYSTEMS:       Constitutional: Negative for chills and fever. HENT: Negative. Eyes: Negative. Respiratory: Negative. Cardiovascular: Negative. Gastrointestinal: Negative for abdominal pain and nausea. Skin: Negative. Neurological: Negative. Objective:   VITALS:    Visit Vitals  BP (!) 94/58 (BP 1 Location: Left upper arm, BP Patient Position: At rest)   Pulse (!) 57   Temp 97.9 °F (36.6 °C)   Resp 16   Ht 5' (1.524 m)   Wt 83.9 kg (185 lb)   LMP  (LMP Unknown) Comment: menopause   SpO2 98%   BMI 36.13 kg/m²     Temp (24hrs), Av °F (36.7 °C), Min:97.9 °F (36.6 °C), Max:98.3 °F (36.8 °C)      PHYSICAL EXAM:     General: NAD, alert, cooperative  Eyes: sclera anicteric  Oral Cavity: No thrush or ulcers  Neck: no JVD  Chest: Fair bilateral air entry. No Wheezing or Rhonchi. No rales.   Heart: normal sounds  Abdomen: soft and non tender   : no alberts  Lower Extremities: no edema  Skin: no rash  Neuro: intact, no focals  Psychiatric: non-depressed          LAB DATA REVIEWED:    Recent Results (from the past 24 hour(s))   CBC WITH AUTOMATED DIFF    Collection Time: 09/25/21  6:07 PM   Result Value Ref Range    WBC 13.8 (H) 3.6 - 11.0 K/uL    RBC 4.92 3.80 - 5.20 M/uL    HGB 15.1 11.5 - 16.0 g/dL    HCT 43.0 35.0 - 47.0 %    MCV 87.4 80.0 - 99.0 FL    MCH 30.7 26.0 - 34.0 PG    MCHC 35.1 30.0 - 36.5 g/dL    RDW 14.3 11.5 - 14.5 %    PLATELET 057 997 - 447 K/uL    MPV 11.6 8.9 - 12.9 FL    NRBC 0.0 0.0  WBC    ABSOLUTE NRBC 0.00 0.00 - 0.01 K/uL    NEUTROPHILS 67 32 - 75 %    LYMPHOCYTES 22 12 - 49 %    MONOCYTES 7 5 - 13 %    EOSINOPHILS 2 0 - 7 %    BASOPHILS 1 0 - 1 %    IMMATURE GRANULOCYTES 1 (H) 0 - 0.5 %    ABS. NEUTROPHILS 9.4 (H) 1.8 - 8.0 K/UL    ABS. LYMPHOCYTES 3.0 0.8 - 3.5 K/UL    ABS. MONOCYTES 1.0 0.0 - 1.0 K/UL    ABS. EOSINOPHILS 0.2 0.0 - 0.4 K/UL    ABS. BASOPHILS 0.1 0.0 - 0.1 K/UL    ABS. IMM. GRANS. 0.1 (H) 0.00 - 0.04 K/UL    DF AUTOMATED     METABOLIC PANEL, COMPREHENSIVE    Collection Time: 09/25/21  6:07 PM   Result Value Ref Range    Sodium 138 136 - 145 mmol/L    Potassium 2.6 (LL) 3.5 - 5.1 mmol/L    Chloride 110 (H) 97 - 108 mmol/L    CO2 13 (LL) 21 - 32 mmol/L    Anion gap 15 5 - 15 mmol/L    Glucose 147 (H) 65 - 100 mg/dL    BUN 33 (H) 6 - 20 mg/dL    Creatinine 2.38 (H) 0.55 - 1.02 mg/dL    BUN/Creatinine ratio 14 12 - 20      GFR est AA 25 (L) >60 ml/min/1.73m2    GFR est non-AA 21 (L) >60 ml/min/1.73m2    Calcium 10.3 (H) 8.5 - 10.1 mg/dL    Bilirubin, total 0.3 0.2 - 1.0 mg/dL    AST (SGOT) 9 (L) 15 - 37 U/L    ALT (SGPT) 12 12 - 78 U/L    Alk.  phosphatase 70 45 - 117 U/L    Protein, total 7.9 6.4 - 8.2 g/dL    Albumin 4.1 3.5 - 5.0 g/dL    Globulin 3.8 2.0 - 4.0 g/dL    A-G Ratio 1.1 1.1 - 2.2     CBC WITH AUTOMATED DIFF    Collection Time: 09/26/21 11:13 AM   Result Value Ref Range    WBC 11.0 3.6 - 11.0 K/uL    RBC 3.78 (L) 3.80 - 5.20 M/uL    HGB 11.5 11.5 - 16.0 g/dL    HCT 32.5 (L) 35.0 - 47.0 %    MCV 86.0 80.0 - 99.0 FL    MCH 30.4 26.0 - 34.0 PG    MCHC 35.4 30.0 - 36.5 g/dL    RDW 13.9 11.5 - 14.5 %    PLATELET 812 201 - 610 K/uL    MPV 12.2 8.9 - 12.9 FL    NRBC 0.0 0.0  WBC    ABSOLUTE NRBC 0.00 0.00 - 0.01 K/uL    NEUTROPHILS 57 32 - 75 %    LYMPHOCYTES 28 12 - 49 %    MONOCYTES 10 5 - 13 %    EOSINOPHILS 3 0 - 7 %    BASOPHILS 1 0 - 1 %    IMMATURE GRANULOCYTES 1 (H) 0 - 0.5 %    ABS. NEUTROPHILS 6.4 1.8 - 8.0 K/UL    ABS. LYMPHOCYTES 3.1 0.8 - 3.5 K/UL    ABS. MONOCYTES 1.1 (H) 0.0 - 1.0 K/UL    ABS. EOSINOPHILS 0.3 0.0 - 0.4 K/UL    ABS. BASOPHILS 0.1 0.0 - 0.1 K/UL    ABS. IMM. GRANS. 0.1 (H) 0.00 - 0.04 K/UL    DF AUTOMATED     METABOLIC PANEL, COMPREHENSIVE    Collection Time: 09/26/21 11:13 AM   Result Value Ref Range    Sodium 140 136 - 145 mmol/L    Potassium 2.7 (LL) 3.5 - 5.1 mmol/L    Chloride 114 (H) 97 - 108 mmol/L    CO2 17 (L) 21 - 32 mmol/L    Anion gap 9 5 - 15 mmol/L    Glucose 98 65 - 100 mg/dL    BUN 27 (H) 6 - 20 mg/dL    Creatinine 1.65 (H) 0.55 - 1.02 mg/dL    BUN/Creatinine ratio 16 12 - 20      GFR est AA 39 (L) >60 ml/min/1.73m2    GFR est non-AA 32 (L) >60 ml/min/1.73m2    Calcium 9.0 8.5 - 10.1 mg/dL    Bilirubin, total 0.4 0.2 - 1.0 mg/dL    AST (SGOT) 11 (L) 15 - 37 U/L    ALT (SGPT) 11 (L) 12 - 78 U/L    Alk.  phosphatase 54 45 - 117 U/L    Protein, total 6.0 (L) 6.4 - 8.2 g/dL    Albumin 3.1 (L) 3.5 - 5.0 g/dL    Globulin 2.9 2.0 - 4.0 g/dL    A-G Ratio 1.1 1.1 - 2.2     RENAL FUNCTION PANEL    Collection Time: 09/26/21 11:13 AM   Result Value Ref Range    Sodium 140 136 - 145 mmol/L    Potassium 2.7 (LL) 3.5 - 5.1 mmol/L    Chloride 113 (H) 97 - 108 mmol/L    CO2 17 (L) 21 - 32 mmol/L    Anion gap 10 5 - 15 mmol/L    Glucose 98 65 - 100 mg/dL    BUN 27 (H) 6 - 20 mg/dL    Creatinine 1.64 (H) 0.55 - 1.02 mg/dL    BUN/Creatinine ratio 16 12 - 20      GFR est AA 39 (L) >60 ml/min/1.73m2    GFR est non-AA 32 (L) >60 ml/min/1.73m2    Calcium 9.0 8.5 - 10.1 mg/dL    Phosphorus 1.9 (L) 2.6 - 4.7 mg/dL    Albumin 3.2 (L) 3.5 - 5.0 g/dL       Recommendations/Plan:  1 Severe hypokalemia:   -She presented with a potassium of 2.6   -symptomatic with muscle weakness  -Hypokalemia appears to be chronic in nature. Likely got worsened because patient stopped her medications 2 weeks ago because of nausea and vomiting  -Likely etiology assumed to be type I renal tubular acidosis, external positive UAG of 45 and urine pH of 6.0 during previous admission. However not sure of the urine pH was checked while she was receiving bicarbonate(in which case hard to distinguish between RTA 1& 2, 2/2 HCO3 spill into urine)  -Clear etiology for RTA 1 unclear. She does not have any history of rheumatoid arthritis/autoimmune disease. NSAID therapy will usually cause RTA 4 not 1/2   -We will repeat urine electrolytes. Check UA for urine pH  -We will start patient on potassium citrate 10 mEq 3 times daily.  -We will hold spironolactone for now because of hypotension  -We will continue to monitor potassium levels closely and adjust the doses of potassium citrate   -Potassium repeat is only 2.7. We will give 3 doses of potassium chloride 40 mEq  -Explained the serious nature of severe hypokalemia to the patient and advised full compliance with all her medications as prescribed     2.  Acute kidney injury:  -Probably prerenal azotemia secondary to dehydration/hypotension  -Creatinine was 2.3 on admission which is improved to 1.6 today. It was 0.9 at the time of discharge on 6/21  -Continue IV fluids and continue to monitor renal functions     2.  Metabolic acidosis;   -nongap acidosis   -secondary to type I RTA  -We will add potassium citrate 10 mEq tid  -We will also start her on bicarbonate drip for now .  -montinue to monitor CO2 levels and potassium levels     3.  Hypotension:  -Patient is low.   I will hold spironolactone for now   -continue IV fluids, and monitor blood pressures     4.  Generalized weakness:   -Acute because of severe hypokalemia   -should improve as her hypokalemia improves   -Will continue to  monitor       4.  Osteoarthritis/neck pains: Appears to be chronic   NSAIDs were recently discontinued.     Thank you for the consultation    _______________________________________________________________________  Signed: Asha Smalls MD

## 2021-09-26 NOTE — PROGRESS NOTES
Two person skin assessment done by myself and Robina Landaverde. Pt has scattered cat scratches present to arms and legs bilaterally. The rest of the skin is clean, dry, and intact.

## 2021-09-27 LAB
ANION GAP SERPL CALC-SCNC: 8 MMOL/L (ref 5–15)
BASOPHILS # BLD: 0.1 K/UL (ref 0–0.1)
BASOPHILS NFR BLD: 1 % (ref 0–1)
BUN SERPL-MCNC: 23 MG/DL (ref 6–20)
BUN/CREAT SERPL: 19 (ref 12–20)
CA-I BLD-MCNC: 8.8 MG/DL (ref 8.5–10.1)
CHLORIDE SERPL-SCNC: 116 MMOL/L (ref 97–108)
CO2 SERPL-SCNC: 21 MMOL/L (ref 21–32)
CREAT SERPL-MCNC: 1.24 MG/DL (ref 0.55–1.02)
DIFFERENTIAL METHOD BLD: ABNORMAL
EOSINOPHIL # BLD: 0.4 K/UL (ref 0–0.4)
EOSINOPHIL NFR BLD: 5 % (ref 0–7)
ERYTHROCYTE [DISTWIDTH] IN BLOOD BY AUTOMATED COUNT: 14.4 % (ref 11.5–14.5)
GLUCOSE SERPL-MCNC: 112 MG/DL (ref 65–100)
HCT VFR BLD AUTO: 29.6 % (ref 35–47)
HGB BLD-MCNC: 10.4 G/DL (ref 11.5–16)
IMM GRANULOCYTES # BLD AUTO: 0 K/UL (ref 0–0.04)
IMM GRANULOCYTES NFR BLD AUTO: 0 % (ref 0–0.5)
LYMPHOCYTES # BLD: 3.5 K/UL (ref 0.8–3.5)
LYMPHOCYTES NFR BLD: 42 % (ref 12–49)
MCH RBC QN AUTO: 30.7 PG (ref 26–34)
MCHC RBC AUTO-ENTMCNC: 35.1 G/DL (ref 30–36.5)
MCV RBC AUTO: 87.3 FL (ref 80–99)
MONOCYTES # BLD: 0.7 K/UL (ref 0–1)
MONOCYTES NFR BLD: 9 % (ref 5–13)
NEUTS SEG # BLD: 3.6 K/UL (ref 1.8–8)
NEUTS SEG NFR BLD: 43 % (ref 32–75)
NRBC # BLD: 0 K/UL (ref 0–0.01)
NRBC BLD-RTO: 0 PER 100 WBC
PLATELET # BLD AUTO: 302 K/UL (ref 150–400)
PMV BLD AUTO: 12.4 FL (ref 8.9–12.9)
POTASSIUM SERPL-SCNC: 3.2 MMOL/L (ref 3.5–5.1)
RBC # BLD AUTO: 3.39 M/UL (ref 3.8–5.2)
SODIUM SERPL-SCNC: 145 MMOL/L (ref 136–145)
WBC # BLD AUTO: 8.3 K/UL (ref 3.6–11)

## 2021-09-27 PROCEDURE — 65270000029 HC RM PRIVATE

## 2021-09-27 PROCEDURE — 74011000250 HC RX REV CODE- 250: Performed by: INTERNAL MEDICINE

## 2021-09-27 PROCEDURE — 80048 BASIC METABOLIC PNL TOTAL CA: CPT

## 2021-09-27 PROCEDURE — 74011250637 HC RX REV CODE- 250/637: Performed by: HOSPITALIST

## 2021-09-27 PROCEDURE — 74011000250 HC RX REV CODE- 250: Performed by: NURSE PRACTITIONER

## 2021-09-27 PROCEDURE — 74011250636 HC RX REV CODE- 250/636: Performed by: INTERNAL MEDICINE

## 2021-09-27 PROCEDURE — 99218 HC RM OBSERVATION: CPT

## 2021-09-27 PROCEDURE — 74011250637 HC RX REV CODE- 250/637: Performed by: INTERNAL MEDICINE

## 2021-09-27 PROCEDURE — 36415 COLL VENOUS BLD VENIPUNCTURE: CPT

## 2021-09-27 PROCEDURE — 96376 TX/PRO/DX INJ SAME DRUG ADON: CPT

## 2021-09-27 PROCEDURE — 85025 COMPLETE CBC W/AUTO DIFF WBC: CPT

## 2021-09-27 RX ORDER — POTASSIUM CHLORIDE AND SODIUM CHLORIDE 450; 150 MG/100ML; MG/100ML
INJECTION, SOLUTION INTRAVENOUS CONTINUOUS
Status: DISCONTINUED | OUTPATIENT
Start: 2021-09-27 | End: 2021-09-28

## 2021-09-27 RX ORDER — POTASSIUM CHLORIDE 20 MEQ/1
40 TABLET, EXTENDED RELEASE ORAL
Status: COMPLETED | OUTPATIENT
Start: 2021-09-27 | End: 2021-09-27

## 2021-09-27 RX ORDER — KETOTIFEN FUMARATE 0.35 MG/ML
1 SOLUTION/ DROPS OPHTHALMIC EVERY 12 HOURS
Status: DISCONTINUED | OUTPATIENT
Start: 2021-09-27 | End: 2021-09-28 | Stop reason: HOSPADM

## 2021-09-27 RX ADMIN — POTASSIUM CITRATE 10 MEQ: 5 TABLET ORAL at 16:33

## 2021-09-27 RX ADMIN — PREGABALIN 150 MG: 150 CAPSULE ORAL at 21:15

## 2021-09-27 RX ADMIN — PAROXETINE HYDROCHLORIDE 40 MG: 10 TABLET, FILM COATED ORAL at 08:52

## 2021-09-27 RX ADMIN — KETOTIFEN FUMARATE 1 DROP: 0.35 SOLUTION/ DROPS OPHTHALMIC at 21:15

## 2021-09-27 RX ADMIN — ROPINIROLE HYDROCHLORIDE 0.25 MG: 0.25 TABLET, FILM COATED ORAL at 16:03

## 2021-09-27 RX ADMIN — SODIUM BICARBONATE 650 MG: 650 TABLET ORAL at 21:15

## 2021-09-27 RX ADMIN — PREGABALIN 150 MG: 150 CAPSULE ORAL at 08:53

## 2021-09-27 RX ADMIN — POTASSIUM CITRATE 10 MEQ: 5 TABLET ORAL at 21:15

## 2021-09-27 RX ADMIN — ROPINIROLE HYDROCHLORIDE 0.25 MG: 0.25 TABLET, FILM COATED ORAL at 21:15

## 2021-09-27 RX ADMIN — POTASSIUM CHLORIDE 40 MEQ: 1500 TABLET, EXTENDED RELEASE ORAL at 11:58

## 2021-09-27 RX ADMIN — SODIUM BICARBONATE 650 MG: 650 TABLET ORAL at 08:53

## 2021-09-27 RX ADMIN — ACETAMINOPHEN 650 MG: 325 TABLET ORAL at 16:05

## 2021-09-27 RX ADMIN — ROPINIROLE HYDROCHLORIDE 0.25 MG: 0.25 TABLET, FILM COATED ORAL at 08:53

## 2021-09-27 RX ADMIN — POTASSIUM CHLORIDE AND SODIUM CHLORIDE: 450; 150 INJECTION, SOLUTION INTRAVENOUS at 11:57

## 2021-09-27 RX ADMIN — SODIUM BICARBONATE: 84 INJECTION, SOLUTION INTRAVENOUS at 07:19

## 2021-09-27 NOTE — PROGRESS NOTES
Hospitalist Progress Note    Subjective:   Daily Progress Note: 2021 5:00 PM    Hospital Course:  Pt admitted for weakness, poor appetite, presented with LOLITA and hypokalemia. Nephrology was consulted for evaluation and management. Pt was started on IV fluids and KCL replacement. Subjective: Pt seen in room, states feels better, not as weak since admission. Current Facility-Administered Medications   Medication Dose Route Frequency    0.45% sodium chloride with KCl 20 mEq/L infusion   IntraVENous CONTINUOUS    pregabalin (LYRICA) capsule 150 mg  150 mg Oral BID    rOPINIRole (REQUIP) tablet 0.25 mg  0.25 mg Oral TID    potassium citrate (UROCIT-K5) tablet 10 mEq  10 mEq Oral TID    sodium bicarbonate tablet 650 mg  650 mg Oral BID    PARoxetine (PAXIL) tablet 40 mg  40 mg Oral DAILY    sodium chloride (NS) flush 5-40 mL  5-40 mL IntraVENous PRN    acetaminophen (TYLENOL) tablet 650 mg  650 mg Oral Q6H PRN    Or    acetaminophen (TYLENOL) suppository 650 mg  650 mg Rectal Q6H PRN    ondansetron (ZOFRAN ODT) tablet 4 mg  4 mg Oral Q6H PRN    Or    ondansetron (ZOFRAN) injection 4 mg  4 mg IntraVENous Q6H PRN        Review of Systems:    Review of Systems   Constitutional: Positive for malaise/fatigue. Negative for chills and fever. HENT: Negative for congestion and sore throat. Respiratory: Negative for cough and shortness of breath. Cardiovascular: Negative for chest pain and palpitations. Genitourinary: Negative for dysuria and urgency. Neurological: Negative for dizziness and headaches.         Objective:     Visit Vitals  /62 (BP 1 Location: Left upper arm, BP Patient Position: At rest)   Pulse 76   Temp 98.3 °F (36.8 °C)   Resp 18   Ht 5' (1.524 m)   Wt 83.9 kg (185 lb)   LMP  (LMP Unknown) Comment: menopause   SpO2 96%   BMI 36.13 kg/m²      O2 Device: None (Room air)    Temp (24hrs), Av.2 °F (36.8 °C), Min:97.9 °F (36.6 °C), Max:98.5 °F (36.9 °C)      No intake/output data recorded. 09/25 1901 - 09/27 0700  In: 2000 [I.V.:2000]  Out: -     PHYSICAL EXAM:    Physical Exam  HENT:      Head: Normocephalic and atraumatic. Eyes:      Comments: Bilateral sclera redness, clear drainage   Cardiovascular:      Rate and Rhythm: Normal rate and regular rhythm. Pulses: Normal pulses. Heart sounds: Normal heart sounds. Abdominal:      General: Bowel sounds are normal.      Palpations: Abdomen is soft. Skin:     General: Skin is warm and dry. Neurological:      Mental Status: She is oriented to person, place, and time. Psychiatric:         Mood and Affect: Mood normal.         Behavior: Behavior normal.            Data Review    Recent Results (from the past 24 hour(s))   CBC WITH AUTOMATED DIFF    Collection Time: 09/27/21  6:23 AM   Result Value Ref Range    WBC 8.3 3.6 - 11.0 K/uL    RBC 3.39 (L) 3.80 - 5.20 M/uL    HGB 10.4 (L) 11.5 - 16.0 g/dL    HCT 29.6 (L) 35.0 - 47.0 %    MCV 87.3 80.0 - 99.0 FL    MCH 30.7 26.0 - 34.0 PG    MCHC 35.1 30.0 - 36.5 g/dL    RDW 14.4 11.5 - 14.5 %    PLATELET 611 019 - 966 K/uL    MPV 12.4 8.9 - 12.9 FL    NRBC 0.0 0.0  WBC    ABSOLUTE NRBC 0.00 0.00 - 0.01 K/uL    NEUTROPHILS 43 32 - 75 %    LYMPHOCYTES 42 12 - 49 %    MONOCYTES 9 5 - 13 %    EOSINOPHILS 5 0 - 7 %    BASOPHILS 1 0 - 1 %    IMMATURE GRANULOCYTES 0 0 - 0.5 %    ABS. NEUTROPHILS 3.6 1.8 - 8.0 K/UL    ABS. LYMPHOCYTES 3.5 0.8 - 3.5 K/UL    ABS. MONOCYTES 0.7 0.0 - 1.0 K/UL    ABS. EOSINOPHILS 0.4 0.0 - 0.4 K/UL    ABS. BASOPHILS 0.1 0.0 - 0.1 K/UL    ABS. IMM.  GRANS. 0.0 0.00 - 0.04 K/UL    DF AUTOMATED     METABOLIC PANEL, BASIC    Collection Time: 09/27/21  6:23 AM   Result Value Ref Range    Sodium 145 136 - 145 mmol/L    Potassium 3.2 (L) 3.5 - 5.1 mmol/L    Chloride 116 (H) 97 - 108 mmol/L    CO2 21 21 - 32 mmol/L    Anion gap 8 5 - 15 mmol/L    Glucose 112 (H) 65 - 100 mg/dL    BUN 23 (H) 6 - 20 mg/dL    Creatinine 1.24 (H) 0.55 - 1.02 mg/dL BUN/Creatinine ratio 19 12 - 20      GFR est AA 54 (L) >60 ml/min/1.73m2    GFR est non-AA 44 (L) >60 ml/min/1.73m2    Calcium 8.8 8.5 - 10.1 mg/dL       No orders to display       Active Problems:    Hypokalemia (11/4/2020)      Acute kidney injury ([facility] [address] (9/25/2021)        Assessment/Plan:   LOLITA- secondary to hypotension/dehydration, creatinine improving, continue IV fluids,   Nephrology following, repeat renal panel in am.    Hypokalemia- possible r/t renal tubular acidosis type I, urine studies pending, stop NSAIDs which could contribute to RTN. Hypotension- hold sprionlactone    Discharge plan- possible next 24 hrs depending on clinical course.          DVT Prophylaxis: sequential compression  Code Status:  Full Code  POA: deonte Ulloa     RFKLAUS MCKEON discussed with:   _________patient, staff nurse______________________________________________________    Celio Velarde NP

## 2021-09-27 NOTE — PROGRESS NOTES
----- Message from Yaniv Townsend MD sent at 8/27/2019  6:20 PM CDT -----  Please notify the patient that EF remains depressed as discussed and will proceed with CRT-D implant as planned.   Renal Note    NAME:  Irvin López   :   1960   MRN:   [id number]     ATTENDING: Yariel Ingram MD  PCP:  Jasmin Castro MD    Date/Time:  2021 2:01 PM      Subjective:     Patient seen in the room. She denies any complaints. Nausea is improving. She was able to eat some of her food yesterday. Labs are significantly better today    Past Medical History:   Diagnosis Date    Chronic obstructive pulmonary disease (HCC)     Chronic pain     neck, slipped disc    Ectopic pregnancy     GERD (gastroesophageal reflux disease)     Heart murmur     History of low potassium     Hypokalemia     Ill-defined condition     murmur    Psychiatric disorder     depression      Past Surgical History:   Procedure Laterality Date    HX APPENDECTOMY      HX OTHER SURGICAL      needle removal left arm     Social History     Tobacco Use    Smoking status: Current Every Day Smoker     Packs/day: 0.25    Smokeless tobacco: Never Used   Substance Use Topics    Alcohol use: Never      Family History   Family history unknown: Yes       No Known Allergies   Prior to Admission medications    Medication Sig Start Date End Date Taking? Authorizing Provider   calcium carbonate (TUMS) 200 mg calcium (500 mg) chew Take 2 Tablets by mouth every four (4) hours as needed for Other (reflux). 21   Natalie DELGADO MD   spironolactone (ALDACTONE) 25 mg tablet Take 0.5 Tablets by mouth two (2) times a day. 21   Natalie DELGADO MD   ferrous sulfate 325 mg (65 mg iron) tablet Take 1 Tab by mouth Daily (before breakfast). 20   Yessica Golden MD   omeprazole (PriLOSEC OTC) 20 mg tablet Take 1 Tab by mouth daily. 20   Yessica Golden MD   PARoxetine (PAXIL) 40 mg tablet Take 1 Tab by mouth daily. 20   Yessica Golden MD   pregabalin (LYRICA) 150 mg capsule Take 1 Cap by mouth two (2) times a day.  Max Daily Amount: 300 mg. 20   Jr Shankar Stephania Aguirre MD   rOPINIRole (REQUIP) 0.25 mg tablet Take 1 Tab by mouth three (3) times daily. 20   Lauren Saldivar MD       REVIEW OF SYSTEMS:       Constitutional: Negative for chills and fever. HENT: Negative. Eyes: Negative. Respiratory: Negative. Cardiovascular: Negative. Gastrointestinal: Negative for abdominal pain and nausea. Skin: Negative. Neurological: Negative. Objective:   VITALS:    Visit Vitals  /67 (BP 1 Location: Left upper arm, BP Patient Position: At rest)   Pulse 87   Temp 97.9 °F (36.6 °C)   Resp 18   Ht 5' (1.524 m)   Wt 83.9 kg (185 lb)   LMP  (LMP Unknown) Comment: menopause   SpO2 95%   BMI 36.13 kg/m²     Temp (24hrs), Av.1 °F (36.7 °C), Min:97.9 °F (36.6 °C), Max:98.5 °F (36.9 °C)      PHYSICAL EXAM:     General: NAD, alert, cooperative  Eyes: sclera anicteric  Oral Cavity: No thrush or ulcers  Neck: no JVD  Chest: Fair bilateral air entry. No Wheezing or Rhonchi. No rales. Heart: normal sounds  Abdomen: soft and non tender   : no alberts  Lower Extremities: no edema  Skin: no rash  Neuro: intact, no focals  Psychiatric: non-depressed          LAB DATA REVIEWED:    Recent Results (from the past 24 hour(s))   CBC WITH AUTOMATED DIFF    Collection Time: 21  6:23 AM   Result Value Ref Range    WBC 8.3 3.6 - 11.0 K/uL    RBC 3.39 (L) 3.80 - 5.20 M/uL    HGB 10.4 (L) 11.5 - 16.0 g/dL    HCT 29.6 (L) 35.0 - 47.0 %    MCV 87.3 80.0 - 99.0 FL    MCH 30.7 26.0 - 34.0 PG    MCHC 35.1 30.0 - 36.5 g/dL    RDW 14.4 11.5 - 14.5 %    PLATELET 814 024 - 980 K/uL    MPV 12.4 8.9 - 12.9 FL    NRBC 0.0 0.0  WBC    ABSOLUTE NRBC 0.00 0.00 - 0.01 K/uL    NEUTROPHILS 43 32 - 75 %    LYMPHOCYTES 42 12 - 49 %    MONOCYTES 9 5 - 13 %    EOSINOPHILS 5 0 - 7 %    BASOPHILS 1 0 - 1 %    IMMATURE GRANULOCYTES 0 0 - 0.5 %    ABS. NEUTROPHILS 3.6 1.8 - 8.0 K/UL    ABS. LYMPHOCYTES 3.5 0.8 - 3.5 K/UL    ABS. MONOCYTES 0.7 0.0 - 1.0 K/UL    ABS.  EOSINOPHILS 0.4 0.0 - 0.4 K/UL    ABS. BASOPHILS 0.1 0.0 - 0.1 K/UL    ABS. IMM. GRANS. 0.0 0.00 - 0.04 K/UL    DF AUTOMATED     METABOLIC PANEL, BASIC    Collection Time: 09/27/21  6:23 AM   Result Value Ref Range    Sodium 145 136 - 145 mmol/L    Potassium 3.2 (L) 3.5 - 5.1 mmol/L    Chloride 116 (H) 97 - 108 mmol/L    CO2 21 21 - 32 mmol/L    Anion gap 8 5 - 15 mmol/L    Glucose 112 (H) 65 - 100 mg/dL    BUN 23 (H) 6 - 20 mg/dL    Creatinine 1.24 (H) 0.55 - 1.02 mg/dL    BUN/Creatinine ratio 19 12 - 20      GFR est AA 54 (L) >60 ml/min/1.73m2    GFR est non-AA 44 (L) >60 ml/min/1.73m2    Calcium 8.8 8.5 - 10.1 mg/dL       Recommendations/Plan:  1 Severe hypokalemia:   -She presented with a potassium of 2.6   -symptomatic with muscle weakness  -Hypokalemia likely worsened as pt stopped her meds 2 weeks ago 2/2 N/V  -Likely etiology assumed to be type I renal tubular acidosis, UAG +45 and urine pH of 6.0 during previous admission. However not sure of the urine pH was checked while she was receiving bicarbonate(in which case hard to distinguish between RTA 1& 2, 2/2 HCO3 spill into urine)  -Clear etiology for RTA 1 unclear. She does not have any history of rheumatoid arthritis/autoimmune disease. NSAIDs will usually cause RTA 4 not 1 or 2   -We will repeat urine electrolytes. Check UA for urine pH(ordered but not done)  -She was started on potassium citrate 10 mEq TID which I will continue  -spironolactone held yesterday because of hypotension. Continue to hold  -We will continue to monitor potassium levels closely and adjust dose of potassium citrate   -Potassium proved to 3.2 today. We will give 1 dose of potassium chloride 40 mEq  -Explained the serious nature of severe hypokalemia to the patient and advised full compliance with all her medications as prescribed     2.  Acute kidney injury:  -Probably prerenal azotemia secondary to dehydration/hypotension  -Creatinine was 2.3 on admission which is improved to 1.2 today. It was 0.9 at the time of discharge on 6/21  -Continue IV fluids and continue to monitor renal functions     2.  Metabolic acidosis;   -nongap acidosis   -secondary to type I RTA  -Continue potassium citrate 10 mEq tid  -I will discontinue bicarbonate drip . Continue oral bicarbonate for now  -montinue to monitor CO2 levels and potassium levels     3.  Hypotension:  -Patient is low. I will continue to hold spironolactone for now   -continue IV fluids, and monitor blood pressures     4.  Generalized weakness:   -Acute because of severe hypokalemia   -should improve as her hypokalemia improves   -Will continue to  monitor       4.  Osteoarthritis/neck pains: Appears to be chronic   NSAIDs were recently discontinued.         _______________________________________________________________________  Signed: Tameka Hilton MD

## 2021-09-28 VITALS
HEIGHT: 60 IN | HEART RATE: 78 BPM | WEIGHT: 185 LBS | RESPIRATION RATE: 16 BRPM | DIASTOLIC BLOOD PRESSURE: 71 MMHG | SYSTOLIC BLOOD PRESSURE: 117 MMHG | TEMPERATURE: 97.5 F | BODY MASS INDEX: 36.32 KG/M2 | OXYGEN SATURATION: 100 %

## 2021-09-28 LAB
ALBUMIN SERPL-MCNC: 3.1 G/DL (ref 3.5–5)
ANION GAP SERPL CALC-SCNC: 6 MMOL/L (ref 5–15)
ATRIAL RATE: 92 BPM
BASOPHILS # BLD: 0.1 K/UL (ref 0–0.1)
BASOPHILS NFR BLD: 1 % (ref 0–1)
BUN SERPL-MCNC: 20 MG/DL (ref 6–20)
BUN/CREAT SERPL: 20 (ref 12–20)
CA-I BLD-MCNC: 9 MG/DL (ref 8.5–10.1)
CALCULATED P AXIS, ECG09: 53 DEGREES
CALCULATED R AXIS, ECG10: 12 DEGREES
CALCULATED T AXIS, ECG11: 88 DEGREES
CHLORIDE SERPL-SCNC: 115 MMOL/L (ref 97–108)
CO2 SERPL-SCNC: 23 MMOL/L (ref 21–32)
CREAT SERPL-MCNC: 1.02 MG/DL (ref 0.55–1.02)
DIAGNOSIS, 93000: NORMAL
DIFFERENTIAL METHOD BLD: ABNORMAL
EOSINOPHIL # BLD: 0.4 K/UL (ref 0–0.4)
EOSINOPHIL NFR BLD: 4 % (ref 0–7)
ERYTHROCYTE [DISTWIDTH] IN BLOOD BY AUTOMATED COUNT: 14.8 % (ref 11.5–14.5)
GLUCOSE SERPL-MCNC: 88 MG/DL (ref 65–100)
HCT VFR BLD AUTO: 31.6 % (ref 35–47)
HGB BLD-MCNC: 10.7 G/DL (ref 11.5–16)
IMM GRANULOCYTES # BLD AUTO: 0 K/UL (ref 0–0.04)
IMM GRANULOCYTES NFR BLD AUTO: 0 % (ref 0–0.5)
LYMPHOCYTES # BLD: 3.9 K/UL (ref 0.8–3.5)
LYMPHOCYTES NFR BLD: 43 % (ref 12–49)
MAGNESIUM SERPL-MCNC: 2 MG/DL (ref 1.6–2.4)
MCH RBC QN AUTO: 30.5 PG (ref 26–34)
MCHC RBC AUTO-ENTMCNC: 33.9 G/DL (ref 30–36.5)
MCV RBC AUTO: 90 FL (ref 80–99)
MONOCYTES # BLD: 0.8 K/UL (ref 0–1)
MONOCYTES NFR BLD: 8 % (ref 5–13)
NEUTS SEG # BLD: 4 K/UL (ref 1.8–8)
NEUTS SEG NFR BLD: 44 % (ref 32–75)
NRBC # BLD: 0 K/UL (ref 0–0.01)
NRBC BLD-RTO: 0 PER 100 WBC
P-R INTERVAL, ECG05: 154 MS
PHOSPHATE SERPL-MCNC: 2 MG/DL (ref 2.6–4.7)
PLATELET # BLD AUTO: 296 K/UL (ref 150–400)
PMV BLD AUTO: 12.5 FL (ref 8.9–12.9)
POTASSIUM SERPL-SCNC: 3.6 MMOL/L (ref 3.5–5.1)
Q-T INTERVAL, ECG07: 346 MS
QRS DURATION, ECG06: 76 MS
QTC CALCULATION (BEZET), ECG08: 427 MS
RBC # BLD AUTO: 3.51 M/UL (ref 3.8–5.2)
SODIUM SERPL-SCNC: 144 MMOL/L (ref 136–145)
VENTRICULAR RATE, ECG03: 92 BPM
WBC # BLD AUTO: 9.2 K/UL (ref 3.6–11)

## 2021-09-28 PROCEDURE — 80069 RENAL FUNCTION PANEL: CPT

## 2021-09-28 PROCEDURE — 36415 COLL VENOUS BLD VENIPUNCTURE: CPT

## 2021-09-28 PROCEDURE — 74011250637 HC RX REV CODE- 250/637: Performed by: INTERNAL MEDICINE

## 2021-09-28 PROCEDURE — 74011250637 HC RX REV CODE- 250/637: Performed by: HOSPITALIST

## 2021-09-28 PROCEDURE — 83735 ASSAY OF MAGNESIUM: CPT

## 2021-09-28 PROCEDURE — 85025 COMPLETE CBC W/AUTO DIFF WBC: CPT

## 2021-09-28 RX ORDER — POTASSIUM CITRATE 5 MEQ/1
10 TABLET, EXTENDED RELEASE ORAL 3 TIMES DAILY
Qty: 90 TABLET | Refills: 0 | Status: SHIPPED | OUTPATIENT
Start: 2021-09-28 | End: 2021-10-28

## 2021-09-28 RX ORDER — ACETAMINOPHEN 325 MG/1
650 TABLET ORAL
Qty: 180 TABLET | Refills: 0 | Status: SHIPPED
Start: 2021-09-28 | End: 2021-10-28

## 2021-09-28 RX ORDER — ONDANSETRON 4 MG/1
4 TABLET, ORALLY DISINTEGRATING ORAL
Qty: 28 TABLET | Refills: 0 | Status: SHIPPED | OUTPATIENT
Start: 2021-09-28 | End: 2021-10-05

## 2021-09-28 RX ADMIN — ROPINIROLE HYDROCHLORIDE 0.25 MG: 0.25 TABLET, FILM COATED ORAL at 10:24

## 2021-09-28 RX ADMIN — ACETAMINOPHEN 650 MG: 325 TABLET ORAL at 09:07

## 2021-09-28 RX ADMIN — KETOTIFEN FUMARATE 1 DROP: 0.35 SOLUTION/ DROPS OPHTHALMIC at 09:08

## 2021-09-28 RX ADMIN — PREGABALIN 150 MG: 150 CAPSULE ORAL at 09:07

## 2021-09-28 RX ADMIN — SODIUM BICARBONATE 650 MG: 650 TABLET ORAL at 09:07

## 2021-09-28 RX ADMIN — POTASSIUM CITRATE 10 MEQ: 5 TABLET ORAL at 10:24

## 2021-09-28 RX ADMIN — PAROXETINE HYDROCHLORIDE 40 MG: 10 TABLET, FILM COATED ORAL at 09:07

## 2021-09-28 NOTE — DISCHARGE SUMMARY
Hospitalist Discharge Summary     Patient ID:    Irvin López  [id number]  64 y.o.  1960    Admit date: 9/25/2021    Discharge date : 9/28/2021    Chronic Diagnoses:    Problem List as of 9/28/2021 Date Reviewed: 9/25/2021        Codes Class Noted - Resolved    Acute kidney injury [facility]) ICD-10-CM: N17.9  ICD-9-CM: 584.9  9/25/2021 - Present        Generalized weakness ICD-10-CM: R53.1  ICD-9-CM: 780.79  6/24/2021 - Present        Hypokalemia ICD-10-CM: E87.6  ICD-9-CM: 276.8  11/4/2020 - Present        UTI (urinary tract infection) ICD-10-CM: N39.0  ICD-9-CM: 599.0  11/4/2020 - Present          22    Final Diagnoses: Active Problems:    Hypokalemia (11/4/2020)      Acute kidney injury ([address] (9/25/2021)        Reason for Hospitalization:  Weakness, poor appetite, nausea    Hospital Course:   Pt reported  symptoms of weakness, muscle aches with associated nausea for several days at home, and presented in LOLITA and profound hypokalemia. She has a history of hypokalemia, and sees nephrology as an outpatient. Pt was started on IV fluids, KCL and sodium bicarbonate supplement as CO2 was 13 on admission. LOLITA and hypokalemia have resolved, and nephrology has cleared her for discharge today. Pt will follow up with nephrology in 2 weeks for repeat lab work. Pt should follow up with PCP in 4 weeks or sooner if needed. Discharge Medications:   Current Discharge Medication List      START taking these medications    Details   acetaminophen (TYLENOL) 325 mg tablet Take 2 Tablets by mouth every four (4) hours as needed for Pain or Fever for up to 30 days. Qty: 180 Tablet, Refills: 0  Start date: 9/28/2021, End date: 10/28/2021      potassium citrate (UROCIT-K5) 5 mEq (540 mg) TbER tablet Take 2 Tablets by mouth three (3) times daily for 30 days.   Qty: 90 Tablet, Refills: 0  Start date: 9/28/2021, End date: 10/28/2021      ondansetron (ZOFRAN ODT) 4 mg disintegrating tablet Take 1 Tablet by mouth every six (6) hours as needed for Nausea or Vomiting, Nausea or Vomiting for up to 7 days. Qty: 28 Tablet, Refills: 0  Start date: 9/28/2021, End date: 10/5/2021         CONTINUE these medications which have NOT CHANGED    Details   calcium carbonate (TUMS) 200 mg calcium (500 mg) chew Take 2 Tablets by mouth every four (4) hours as needed for Other (reflux). Qty: 30 Tablet, Refills: 0      ferrous sulfate 325 mg (65 mg iron) tablet Take 1 Tab by mouth Daily (before breakfast). Qty: 30 Tab, Refills: 0      omeprazole (PriLOSEC OTC) 20 mg tablet Take 1 Tab by mouth daily. Qty: 30 Tab, Refills: 0      PARoxetine (PAXIL) 40 mg tablet Take 1 Tab by mouth daily. Qty: 30 Tab, Refills: 0      pregabalin (LYRICA) 150 mg capsule Take 1 Cap by mouth two (2) times a day. Max Daily Amount: 300 mg. Qty: 60 Cap, Refills: 0    Associated Diagnoses: Idiopathic peripheral neuropathy      rOPINIRole (REQUIP) 0.25 mg tablet Take 1 Tab by mouth three (3) times daily. Qty: 90 Tab, Refills: 0         STOP taking these medications       spironolactone (ALDACTONE) 25 mg tablet Comments:   Reason for Stopping: Follow up Care:    1. Ofelia Grubbs MD in 1-2 weeks. Follow-up Information     Follow up With Specialties Details Why Mark Tariq MD Nephrology In 2 weeks  [facility]  [phone]      Ofelia Grubbs MD Internal Medicine In 4 weeks call for virtual appointment [address] [place] [address]  [phone]               Follow-up Care/Patient Instructions:   Activity: Activity as tolerated  Diet: Regular Diet  Wound Care: None needed      Condition at Discharge:  Stable  __________________________________________________________________    Disposition  Home or Self Care  ____________________________________________________________________    Code Status:  Full Code  ___________________________________________________________________    Discharge Exam:  Patient seen and examined by me on discharge day. Physical Exam  Constitutional:       General: She is not in acute distress. Appearance: She is obese. Cardiovascular:      Pulses: Normal pulses. Heart sounds: Normal heart sounds. Pulmonary:      Effort: Pulmonary effort is normal.      Breath sounds: Normal breath sounds. Abdominal:      General: Bowel sounds are normal.      Palpations: Abdomen is soft. Musculoskeletal:         General: Normal range of motion. Skin:     General: Skin is warm and dry. Neurological:      Mental Status: She is oriented to person, place, and time. Psychiatric:         Mood and Affect: Mood normal.         Behavior: Behavior normal.          CONSULTATIONS: Nephrology    Significant Diagnostic Studies:   Recent Results (from the past 24 hour(s))   CBC WITH AUTOMATED DIFF    Collection Time: 09/28/21  7:30 AM   Result Value Ref Range    WBC 9.2 3.6 - 11.0 K/uL    RBC 3.51 (L) 3.80 - 5.20 M/uL    HGB 10.7 (L) 11.5 - 16.0 g/dL    HCT 31.6 (L) 35.0 - 47.0 %    MCV 90.0 80.0 - 99.0 FL    MCH 30.5 26.0 - 34.0 PG    MCHC 33.9 30.0 - 36.5 g/dL    RDW 14.8 (H) 11.5 - 14.5 %    PLATELET 650 568 - 144 K/uL    MPV 12.5 8.9 - 12.9 FL    NRBC 0.0 0.0  WBC    ABSOLUTE NRBC 0.00 0.00 - 0.01 K/uL    NEUTROPHILS 44 32 - 75 %    LYMPHOCYTES 43 12 - 49 %    MONOCYTES 8 5 - 13 %    EOSINOPHILS 4 0 - 7 %    BASOPHILS 1 0 - 1 %    IMMATURE GRANULOCYTES 0 0 - 0.5 %    ABS. NEUTROPHILS 4.0 1.8 - 8.0 K/UL    ABS. LYMPHOCYTES 3.9 (H) 0.8 - 3.5 K/UL    ABS. MONOCYTES 0.8 0.0 - 1.0 K/UL    ABS. EOSINOPHILS 0.4 0.0 - 0.4 K/UL    ABS. BASOPHILS 0.1 0.0 - 0.1 K/UL    ABS. IMM.  GRANS. 0.0 0.00 - 0.04 K/UL    DF AUTOMATED     RENAL FUNCTION PANEL    Collection Time: 09/28/21  7:30 AM   Result Value Ref Range    Sodium 144 136 - 145 mmol/L    Potassium 3.6 3.5 - 5.1 mmol/L    Chloride 115 (H) 97 - 108 mmol/L    CO2 23 21 - 32 mmol/L    Anion gap 6 5 - 15 mmol/L    Glucose 88 65 - 100 mg/dL    BUN 20 6 - 20 mg/dL    Creatinine 1.02 0.55 - 1.02 mg/dL    BUN/Creatinine ratio 20 12 - 20      GFR est AA >60 >60 ml/min/1.73m2    GFR est non-AA 56 (L) >60 ml/min/1.73m2    Calcium 9.0 8.5 - 10.1 mg/dL    Phosphorus 2.0 (L) 2.6 - 4.7 mg/dL    Albumin 3.1 (L) 3.5 - 5.0 g/dL   MAGNESIUM    Collection Time: 09/28/21  7:30 AM   Result Value Ref Range    Magnesium 2.0 1.6 - 2.4 mg/dL     No orders to display       Discharge: time spent 35 minutes in discharge  Education and counseling.      Signed:  Candelaria Hanna NP  9/28/2021  10:32 AM

## 2021-09-28 NOTE — DISCHARGE INSTRUCTIONS
Patient Education        Hypokalemia: Care Instructions  Your Care Instructions     Hypokalemia (say \"lt-my-kna-MATA-angélica-uh\") is a low level of potassium. The heart, muscles, kidneys, and nervous system all need potassium to work well. This problem has many different causes. Kidney problems, diet, and medicines like diuretics and laxatives can cause it. So can vomiting or diarrhea. In some cases, cancer is the cause. Your doctor may do tests to find the cause of your low potassium levels. You may need medicines to bring your potassium levels back to normal. You may also need regular blood tests to check your potassium. If you have very low potassium, you may need intravenous (IV) medicines. You also may need tests to check the electrical activity of your heart. Heart problems caused by low potassium levels can be very serious. Follow-up care is a key part of your treatment and safety. Be sure to make and go to all appointments, and call your doctor if you are having problems. It's also a good idea to know your test results and keep a list of the medicines you take. How can you care for yourself at home? · If your doctor recommends it, eat foods that have a lot of potassium. These include fresh fruits, juices, and vegetables. They also include nuts, beans, and milk. · Be safe with medicines. If your doctor prescribes medicines or potassium supplements, take them exactly as directed. Call your doctor if you have any problems with your medicines. · Get your potassium levels tested as often as your doctor tells you. When should you call for help? Call 911 anytime you think you may need emergency care. For example, call if:    · You feel like your heart is missing beats. Heart problems caused by low potassium can cause death.     · You passed out (lost consciousness).     · You have a seizure.    Call your doctor now or seek immediate medical care if:    · You feel weak or unusually tired.     · You have severe arm or leg cramps.     · You have tingling or numbness.     · You feel sick to your stomach, or you vomit.     · You have belly cramps.     · You feel bloated or constipated.     · You have to urinate a lot.     · You feel very thirsty most of the time.     · You are dizzy or lightheaded, or you feel like you may faint.     · You feel depressed, or you lose touch with reality. Watch closely for changes in your health, and be sure to contact your doctor if:    · You do not get better as expected. Where can you learn more? Go to http://www.gray.com/  Enter G358 in the search box to learn more about \"Hypokalemia: Care Instructions. \"  Current as of: 2020               Content Version: 13.0  © 2126-7905 [employer]. Care instructions adapted under license by [facility] [employer] (which disclaims liability or warranty for this information). If you have questions about a medical condition or this instruction, always ask your healthcare professional. [place] [address] any warranty or liability for your use of this information. HOSPITALIST DISCHARGE INSTRUCTIONS    NAME: Nikia Byrd   :  1960   MRN:  [id number]     Date/Time:  2021 10:29 AM    ADMIT DATE: 2021   DISCHARGE DATE: 2021     Attending Physician: Oz Britt NP    DISCHARGE DIAGNOSIS:  Hypokalemia, LOLITA      Medications: Per above medication reconciliation. Pain Management: per above medications    Recommended diet: regular    Recommended activity: resume normal ADLS    Wound care: none    Indwelling devices:  none    Supplemental Oxygen: none    Required Lab work: none    Glucose management:  none    Code status: Full        Outside physician follow up:     Follow-up Information     Follow up With Specialties Details Why Contact Info    Nicki Moon MD Nephrology In 2 weeks  [address] [facility],[address] [place] [address]  [phone] Hector Reynoso MD Internal Medicine In 4 weeks call for virtual appointment [address] [facility] [phone]                 Home self/care MD responsible for above on discharge. Information obtained by :  I understand that if any problems occur once I am at home I am to contact my physician. I understand and acknowledge receipt of the instructions indicated above.                                                                                                                                            Physician's or R.N.'s Signature                                                                  Date/Time                                                                                                                                              Patient or Representative

## 2021-09-28 NOTE — PROGRESS NOTES
I have reviewed discharge instructions with the patient. The patient verbalized understanding. Discharge plan of care/case management plan validated with provider discharge order.

## 2022-03-10 ENCOUNTER — HOSPITAL ENCOUNTER (INPATIENT)
Age: 62
LOS: 5 days | Discharge: HOME | DRG: 871 | End: 2022-03-16
Attending: INTERNAL MEDICINE | Admitting: INTERNAL MEDICINE
Payer: COMMERCIAL

## 2022-03-10 DIAGNOSIS — M17.0 OSTEOARTHRITIS OF BOTH KNEES, UNSPECIFIED OSTEOARTHRITIS TYPE: ICD-10-CM

## 2022-03-10 DIAGNOSIS — N17.9 AKI (ACUTE KIDNEY INJURY) (HCC): Primary | ICD-10-CM

## 2022-03-10 DIAGNOSIS — K92.2 GASTROINTESTINAL HEMORRHAGE, UNSPECIFIED GASTROINTESTINAL HEMORRHAGE TYPE: ICD-10-CM

## 2022-03-10 DIAGNOSIS — R41.82 ALTERED MENTAL STATUS, UNSPECIFIED ALTERED MENTAL STATUS TYPE: ICD-10-CM

## 2022-03-10 DIAGNOSIS — G60.9 IDIOPATHIC PERIPHERAL NEUROPATHY: ICD-10-CM

## 2022-03-10 PROCEDURE — 96361 HYDRATE IV INFUSION ADD-ON: CPT

## 2022-03-10 PROCEDURE — 99285 EMERGENCY DEPT VISIT HI MDM: CPT

## 2022-03-10 PROCEDURE — 93005 ELECTROCARDIOGRAM TRACING: CPT

## 2022-03-10 PROCEDURE — 96374 THER/PROPH/DIAG INJ IV PUSH: CPT

## 2022-03-11 ENCOUNTER — APPOINTMENT (OUTPATIENT)
Dept: GENERAL RADIOLOGY | Age: 62
DRG: 871 | End: 2022-03-11
Attending: NURSE PRACTITIONER
Payer: COMMERCIAL

## 2022-03-11 PROBLEM — K92.2 GI BLEED: Status: ACTIVE | Noted: 2022-03-11

## 2022-03-11 LAB
ALBUMIN SERPL-MCNC: 2.7 G/DL (ref 3.5–5)
ALBUMIN/GLOB SERPL: 0.6 {RATIO} (ref 1.1–2.2)
ALP SERPL-CCNC: 172 U/L (ref 45–117)
ALT SERPL-CCNC: 27 U/L (ref 12–78)
AMMONIA PLAS-SCNC: 24 UMOL/L
ANION GAP SERPL CALC-SCNC: 16 MMOL/L (ref 5–15)
APPEARANCE UR: CLEAR
ARTERIAL PATENCY WRIST A: ABNORMAL
AST SERPL W P-5'-P-CCNC: 19 U/L (ref 15–37)
ATRIAL RATE: 83 BPM
BACTERIA URNS QL MICRO: ABNORMAL /HPF
BASE DEFICIT BLDA-SCNC: 15 MMOL/L (ref 0–2)
BASOPHILS # BLD: 0 K/UL (ref 0–0.1)
BASOPHILS NFR BLD: 0 % (ref 0–1)
BDY SITE: ABNORMAL
BILIRUB SERPL-MCNC: 0.3 MG/DL (ref 0.2–1)
BILIRUB UR QL: NEGATIVE
BUN SERPL-MCNC: 44 MG/DL (ref 6–20)
BUN/CREAT SERPL: 23 (ref 12–20)
CA-I BLD-MCNC: 8.8 MG/DL (ref 8.5–10.1)
CALCULATED P AXIS, ECG09: 7 DEGREES
CALCULATED R AXIS, ECG10: 20 DEGREES
CALCULATED T AXIS, ECG11: 54 DEGREES
CHLORIDE SERPL-SCNC: 114 MMOL/L (ref 97–108)
CO2 SERPL-SCNC: 12 MMOL/L (ref 21–32)
COLOR UR: ABNORMAL
COVID-19 RAPID TEST, COVR: NOT DETECTED
CREAT SERPL-MCNC: 1.93 MG/DL (ref 0.55–1.02)
CRP SERPL-MCNC: 14.6 MG/DL (ref 0–0.6)
DIAGNOSIS, 93000: NORMAL
DIFFERENTIAL METHOD BLD: ABNORMAL
EOSINOPHIL # BLD: 0.2 K/UL (ref 0–0.4)
EOSINOPHIL NFR BLD: 1 % (ref 0–7)
ERYTHROCYTE [DISTWIDTH] IN BLOOD BY AUTOMATED COUNT: 22.6 % (ref 11.5–14.5)
FIO2 ON VENT: 21 %
GLOBULIN SER CALC-MCNC: 4.5 G/DL (ref 2–4)
GLUCOSE BLD STRIP.AUTO-MCNC: 88 MG/DL (ref 65–117)
GLUCOSE SERPL-MCNC: 86 MG/DL (ref 65–100)
GLUCOSE UR STRIP.AUTO-MCNC: NEGATIVE MG/DL
HCO3 BLDA-SCNC: 13 MMOL/L (ref 22–26)
HCT VFR BLD AUTO: 22.7 % (ref 35–47)
HCT VFR BLD AUTO: 25.9 % (ref 35–47)
HEMOCCULT SP1 STL QL: POSITIVE
HGB BLD-MCNC: 7.1 G/DL (ref 11.5–16)
HGB BLD-MCNC: 8.1 G/DL (ref 11.5–16)
HGB UR QL STRIP: ABNORMAL
IMM GRANULOCYTES # BLD AUTO: 0 K/UL
IMM GRANULOCYTES NFR BLD AUTO: 0 %
KETONES UR QL STRIP.AUTO: NEGATIVE MG/DL
LACTATE SERPL-SCNC: 0.7 MMOL/L (ref 0.4–2)
LEUKOCYTE ESTERASE UR QL STRIP.AUTO: ABNORMAL
LYMPHOCYTES # BLD: 3.4 K/UL (ref 0.8–3.5)
LYMPHOCYTES NFR BLD: 18 % (ref 12–49)
MCH RBC QN AUTO: 26.2 PG (ref 26–34)
MCHC RBC AUTO-ENTMCNC: 31.3 G/DL (ref 30–36.5)
MCV RBC AUTO: 83.8 FL (ref 80–99)
MONOCYTES # BLD: 0.6 K/UL (ref 0–1)
MONOCYTES NFR BLD: 3 % (ref 5–13)
MRSA DNA SPEC QL NAA+PROBE: NOT DETECTED
MUCOUS THREADS URNS QL MICRO: ABNORMAL /LPF
MYELOCYTES NFR BLD MANUAL: 3 %
NEUTS SEG # BLD: 14.3 K/UL (ref 1.8–8)
NEUTS SEG NFR BLD: 75 % (ref 32–75)
NITRITE UR QL STRIP.AUTO: NEGATIVE
NRBC # BLD: 0.03 K/UL (ref 0–0.01)
NRBC BLD-RTO: 0.2 PER 100 WBC
P-R INTERVAL, ECG05: 106 MS
PCO2 BLDA: 25 MMHG (ref 35–45)
PERFORMED BY, TECHID: NORMAL
PH BLDA: 7.25 [PH] (ref 7.35–7.45)
PH UR STRIP: 6 [PH] (ref 5–8)
PLATELET # BLD AUTO: 595 K/UL (ref 150–400)
PLATELET COMMENTS,PCOM: ABNORMAL
PMV BLD AUTO: 12.4 FL (ref 8.9–12.9)
PO2 BLDA: 85 MMHG (ref 75–100)
POTASSIUM SERPL-SCNC: 4.4 MMOL/L (ref 3.5–5.1)
PROCALCITONIN SERPL-MCNC: 384.22 NG/ML
PROT SERPL-MCNC: 7.2 G/DL (ref 6.4–8.2)
PROT UR STRIP-MCNC: 30 MG/DL
Q-T INTERVAL, ECG07: 370 MS
QRS DURATION, ECG06: 72 MS
QTC CALCULATION (BEZET), ECG08: 434 MS
RBC # BLD AUTO: 3.09 M/UL (ref 3.8–5.2)
RBC #/AREA URNS HPF: ABNORMAL /HPF (ref 0–5)
RBC MORPH BLD: ABNORMAL
SAO2 % BLD: 97 %
SAO2% DEVICE SAO2% SENSOR NAME: ABNORMAL
SODIUM SERPL-SCNC: 142 MMOL/L (ref 136–145)
SP GR UR REFRACTOMETRY: 1.01 (ref 1–1.03)
UROBILINOGEN UR QL STRIP.AUTO: 0.1 EU/DL (ref 0.1–1)
VENTRICULAR RATE, ECG03: 83 BPM
WBC # BLD AUTO: 19.1 K/UL (ref 3.6–11)
WBC URNS QL MICRO: ABNORMAL /HPF (ref 0–4)

## 2022-03-11 PROCEDURE — 81001 URINALYSIS AUTO W/SCOPE: CPT

## 2022-03-11 PROCEDURE — 74011000258 HC RX REV CODE- 258: Performed by: NURSE PRACTITIONER

## 2022-03-11 PROCEDURE — 86920 COMPATIBILITY TEST SPIN: CPT

## 2022-03-11 PROCEDURE — 82803 BLOOD GASES ANY COMBINATION: CPT

## 2022-03-11 PROCEDURE — 74011250636 HC RX REV CODE- 250/636: Performed by: NURSE PRACTITIONER

## 2022-03-11 PROCEDURE — 74011250636 HC RX REV CODE- 250/636: Performed by: INTERNAL MEDICINE

## 2022-03-11 PROCEDURE — 74011250637 HC RX REV CODE- 250/637: Performed by: INTERNAL MEDICINE

## 2022-03-11 PROCEDURE — 85018 HEMOGLOBIN: CPT

## 2022-03-11 PROCEDURE — 86900 BLOOD TYPING SEROLOGIC ABO: CPT

## 2022-03-11 PROCEDURE — 82140 ASSAY OF AMMONIA: CPT

## 2022-03-11 PROCEDURE — C9113 INJ PANTOPRAZOLE SODIUM, VIA: HCPCS | Performed by: INTERNAL MEDICINE

## 2022-03-11 PROCEDURE — 85025 COMPLETE CBC W/AUTO DIFF WBC: CPT

## 2022-03-11 PROCEDURE — 74011000250 HC RX REV CODE- 250: Performed by: NURSE PRACTITIONER

## 2022-03-11 PROCEDURE — 87641 MR-STAPH DNA AMP PROBE: CPT

## 2022-03-11 PROCEDURE — 96361 HYDRATE IV INFUSION ADD-ON: CPT

## 2022-03-11 PROCEDURE — 96374 THER/PROPH/DIAG INJ IV PUSH: CPT

## 2022-03-11 PROCEDURE — 87040 BLOOD CULTURE FOR BACTERIA: CPT

## 2022-03-11 PROCEDURE — 80053 COMPREHEN METABOLIC PANEL: CPT

## 2022-03-11 PROCEDURE — 87635 SARS-COV-2 COVID-19 AMP PRB: CPT

## 2022-03-11 PROCEDURE — 71045 X-RAY EXAM CHEST 1 VIEW: CPT

## 2022-03-11 PROCEDURE — 74011000250 HC RX REV CODE- 250: Performed by: INTERNAL MEDICINE

## 2022-03-11 PROCEDURE — P9016 RBC LEUKOCYTES REDUCED: HCPCS

## 2022-03-11 PROCEDURE — 82962 GLUCOSE BLOOD TEST: CPT

## 2022-03-11 PROCEDURE — 86140 C-REACTIVE PROTEIN: CPT

## 2022-03-11 PROCEDURE — 83605 ASSAY OF LACTIC ACID: CPT

## 2022-03-11 PROCEDURE — 82272 OCCULT BLD FECES 1-3 TESTS: CPT

## 2022-03-11 PROCEDURE — 36415 COLL VENOUS BLD VENIPUNCTURE: CPT

## 2022-03-11 PROCEDURE — C9113 INJ PANTOPRAZOLE SODIUM, VIA: HCPCS | Performed by: NURSE PRACTITIONER

## 2022-03-11 PROCEDURE — 84145 PROCALCITONIN (PCT): CPT

## 2022-03-11 PROCEDURE — 65270000029 HC RM PRIVATE

## 2022-03-11 PROCEDURE — 86870 RBC ANTIBODY IDENTIFICATION: CPT

## 2022-03-11 RX ORDER — SULFAMETHOXAZOLE AND TRIMETHOPRIM 800; 160 MG/1; MG/1
1 TABLET ORAL EVERY 12 HOURS
Status: DISCONTINUED | OUTPATIENT
Start: 2022-03-11 | End: 2022-03-11

## 2022-03-11 RX ORDER — SODIUM CHLORIDE 0.9 % (FLUSH) 0.9 %
5-40 SYRINGE (ML) INJECTION AS NEEDED
Status: DISCONTINUED | OUTPATIENT
Start: 2022-03-11 | End: 2022-03-16 | Stop reason: HOSPADM

## 2022-03-11 RX ORDER — POTASSIUM CITRATE 5 MEQ/1
10 TABLET, EXTENDED RELEASE ORAL 3 TIMES DAILY
Status: DISCONTINUED | OUTPATIENT
Start: 2022-03-11 | End: 2022-03-12

## 2022-03-11 RX ORDER — PREGABALIN 150 MG/1
150 CAPSULE ORAL 2 TIMES DAILY
Status: DISCONTINUED | OUTPATIENT
Start: 2022-03-11 | End: 2022-03-16 | Stop reason: HOSPADM

## 2022-03-11 RX ORDER — ONDANSETRON 4 MG/1
4 TABLET, ORALLY DISINTEGRATING ORAL
Status: DISCONTINUED | OUTPATIENT
Start: 2022-03-11 | End: 2022-03-16 | Stop reason: HOSPADM

## 2022-03-11 RX ORDER — SODIUM CHLORIDE 9 MG/ML
250 INJECTION, SOLUTION INTRAVENOUS AS NEEDED
Status: DISCONTINUED | OUTPATIENT
Start: 2022-03-11 | End: 2022-03-16 | Stop reason: HOSPADM

## 2022-03-11 RX ORDER — CALCIUM CARBONATE 200(500)MG
400 TABLET,CHEWABLE ORAL
Status: DISCONTINUED | OUTPATIENT
Start: 2022-03-11 | End: 2022-03-16 | Stop reason: HOSPADM

## 2022-03-11 RX ORDER — ACETAMINOPHEN 650 MG/1
650 SUPPOSITORY RECTAL
Status: DISCONTINUED | OUTPATIENT
Start: 2022-03-11 | End: 2022-03-16 | Stop reason: HOSPADM

## 2022-03-11 RX ORDER — ONDANSETRON 2 MG/ML
4 INJECTION INTRAMUSCULAR; INTRAVENOUS
Status: DISCONTINUED | OUTPATIENT
Start: 2022-03-11 | End: 2022-03-16 | Stop reason: HOSPADM

## 2022-03-11 RX ORDER — SODIUM BICARBONATE 650 MG/1
2 TABLET ORAL DAILY
COMMUNITY
Start: 2022-02-10

## 2022-03-11 RX ORDER — PAROXETINE HYDROCHLORIDE 20 MG/1
40 TABLET, FILM COATED ORAL DAILY
Status: DISCONTINUED | OUTPATIENT
Start: 2022-03-11 | End: 2022-03-16 | Stop reason: HOSPADM

## 2022-03-11 RX ORDER — ACETAMINOPHEN 325 MG/1
650 TABLET ORAL
Status: DISCONTINUED | OUTPATIENT
Start: 2022-03-11 | End: 2022-03-16 | Stop reason: HOSPADM

## 2022-03-11 RX ORDER — SODIUM CHLORIDE 0.9 % (FLUSH) 0.9 %
5-40 SYRINGE (ML) INJECTION EVERY 8 HOURS
Status: DISCONTINUED | OUTPATIENT
Start: 2022-03-11 | End: 2022-03-16

## 2022-03-11 RX ORDER — ROPINIROLE 0.25 MG/1
0.25 TABLET, FILM COATED ORAL 3 TIMES DAILY
Status: DISCONTINUED | OUTPATIENT
Start: 2022-03-11 | End: 2022-03-16 | Stop reason: HOSPADM

## 2022-03-11 RX ORDER — SODIUM BICARBONATE 650 MG/1
1300 TABLET ORAL DAILY
Status: DISCONTINUED | OUTPATIENT
Start: 2022-03-11 | End: 2022-03-13

## 2022-03-11 RX ORDER — PREGABALIN 225 MG/1
1 CAPSULE ORAL 2 TIMES DAILY
COMMUNITY
Start: 2022-02-14 | End: 2022-03-11

## 2022-03-11 RX ORDER — POLYETHYLENE GLYCOL 3350 17 G/17G
17 POWDER, FOR SOLUTION ORAL DAILY PRN
Status: DISCONTINUED | OUTPATIENT
Start: 2022-03-11 | End: 2022-03-16 | Stop reason: HOSPADM

## 2022-03-11 RX ADMIN — PIPERACILLIN AND TAZOBACTAM 3.38 G: 3; .375 INJECTION, POWDER, LYOPHILIZED, FOR SOLUTION INTRAVENOUS at 12:28

## 2022-03-11 RX ADMIN — SODIUM BICARBONATE: 84 INJECTION, SOLUTION INTRAVENOUS at 08:56

## 2022-03-11 RX ADMIN — PREGABALIN 150 MG: 150 CAPSULE ORAL at 09:14

## 2022-03-11 RX ADMIN — PAROXETINE HYDROCHLORIDE 40 MG: 20 TABLET, FILM COATED ORAL at 09:14

## 2022-03-11 RX ADMIN — SODIUM CHLORIDE, PRESERVATIVE FREE 10 ML: 5 INJECTION INTRAVENOUS at 21:48

## 2022-03-11 RX ADMIN — ROPINIROLE HYDROCHLORIDE 0.25 MG: 0.25 TABLET, FILM COATED ORAL at 09:14

## 2022-03-11 RX ADMIN — SULFAMETHOXAZOLE AND TRIMETHOPRIM 1 TABLET: 800; 160 TABLET ORAL at 09:14

## 2022-03-11 RX ADMIN — SODIUM CHLORIDE 1000 ML: 9 INJECTION, SOLUTION INTRAVENOUS at 01:42

## 2022-03-11 RX ADMIN — SODIUM CHLORIDE, PRESERVATIVE FREE 40 MG: 5 INJECTION INTRAVENOUS at 20:54

## 2022-03-11 RX ADMIN — PIPERACILLIN AND TAZOBACTAM 3.38 G: 3; .375 INJECTION, POWDER, LYOPHILIZED, FOR SOLUTION INTRAVENOUS at 20:54

## 2022-03-11 RX ADMIN — ROPINIROLE HYDROCHLORIDE 0.25 MG: 0.25 TABLET, FILM COATED ORAL at 15:08

## 2022-03-11 RX ADMIN — SODIUM CHLORIDE, PRESERVATIVE FREE 10 ML: 5 INJECTION INTRAVENOUS at 15:09

## 2022-03-11 RX ADMIN — POTASSIUM CITRATE 10 MEQ: 5 TABLET ORAL at 21:47

## 2022-03-11 RX ADMIN — SODIUM CHLORIDE, PRESERVATIVE FREE 40 MG: 5 INJECTION INTRAVENOUS at 03:52

## 2022-03-11 RX ADMIN — PREGABALIN 150 MG: 150 CAPSULE ORAL at 20:54

## 2022-03-11 RX ADMIN — SODIUM CHLORIDE, PRESERVATIVE FREE 40 MG: 5 INJECTION INTRAVENOUS at 01:48

## 2022-03-11 RX ADMIN — SODIUM BICARBONATE: 84 INJECTION, SOLUTION INTRAVENOUS at 15:04

## 2022-03-11 RX ADMIN — ROPINIROLE HYDROCHLORIDE 0.25 MG: 0.25 TABLET, FILM COATED ORAL at 21:47

## 2022-03-11 RX ADMIN — POTASSIUM CITRATE 10 MEQ: 5 TABLET ORAL at 15:05

## 2022-03-11 RX ADMIN — SODIUM CHLORIDE, PRESERVATIVE FREE 10 ML: 5 INJECTION INTRAVENOUS at 05:07

## 2022-03-11 NOTE — PROGRESS NOTES
Hospitalist Progress Note         MARGARET Costa, FNP-C    Daily Progress Note: 3/11/2022    Marlise Simmonds is a 63 y.o. female with PMH of COPD, anemia and depression. Patient is a poor historian with majority of the history obtained from  and ED physician. She presented to the ED with chief complaint of altered mental status and generalized weakness and chills. In addition she also complains of shortness of breath, and sore throat for the past few days. She otherwise denies productive cough, abdominal pain, or UTI symptoms. Upon further questioning, she reports having black tarry stool for the past week. She also had episodes of nausea and vomiting about 2 weeks ago, but has since resolved. Admits to regular NSAIDs use.      In ED, vital signs within normal limits. Due to leukocytosis, WBC 19. Also has anemia, hemoglobin 8, baseline 10. Noted anion gap metabolic acidosis (chart review revealed previous diagnosis of RTA type I) and LOLITA on CKD. Lactic acid and ammonia negative. Urinalysis suggestive of UTI. Chest x-ray negative for acute findings, hiatal hernia noted. Metabolic acidosis noted on ABGs. Start sodium bicarb gtt. Obtain nephrology evaluation. Subjective:   Subjective   Patient examined alert with some confusion lying in bed  No acute distress noted on examination  No overnight events reported    Review of Systems:   Review of Systems   Constitutional: Negative for fever. Respiratory: Negative for cough and shortness of breath. Cardiovascular: Negative for chest pain. Genitourinary: Negative for dysuria. Musculoskeletal: Negative for myalgias.          Objective:   Objective      Vitals:  Patient Vitals for the past 12 hrs:   Temp Pulse Resp BP SpO2   03/11/22 0801 99.5 °F (37.5 °C) (!) 105 20 (!) 96/56 97 %   03/11/22 0330 97.4 °F (36.3 °C) 96 20 130/70 98 %   03/11/22 0227  88 19 130/79 99 %   03/11/22 0045  95 16 (!) 144/83 99 %   03/10/22 2348 97.6 °F (36.4 °C) 84 20 114/62 99 %        Physical Exam:  Physical Exam  Vitals and nursing note reviewed. Constitutional:       Appearance: Normal appearance. Cardiovascular:      Rate and Rhythm: Tachycardia present. Heart sounds: Normal heart sounds. Pulmonary:      Effort: Pulmonary effort is normal.      Breath sounds: Normal breath sounds. Abdominal:      General: Bowel sounds are normal.      Palpations: Abdomen is soft. Musculoskeletal:         General: Normal range of motion. Skin:     General: Skin is warm and dry. Capillary Refill: Capillary refill takes less than 2 seconds. Neurological:      Mental Status: She is alert. She is disoriented. Psychiatric:         Mood and Affect: Mood normal.          Lab Results:  Recent Results (from the past 24 hour(s))   CBC WITH AUTOMATED DIFF    Collection Time: 03/11/22 12:00 AM   Result Value Ref Range    WBC 19.1 (H) 3.6 - 11.0 K/uL    RBC 3.09 (L) 3.80 - 5.20 M/uL    HGB 8.1 (L) 11.5 - 16.0 g/dL    HCT 25.9 (L) 35.0 - 47.0 %    MCV 83.8 80.0 - 99.0 FL    MCH 26.2 26.0 - 34.0 PG    MCHC 31.3 30.0 - 36.5 g/dL    RDW 22.6 (H) 11.5 - 14.5 %    PLATELET 262 (H) 091 - 400 K/uL    MPV 12.4 8.9 - 12.9 FL    NRBC 0.2 (H) 0.0  WBC    ABSOLUTE NRBC 0.03 (H) 0.00 - 0.01 K/uL    NEUTROPHILS 75 32 - 75 %    LYMPHOCYTES 18 12 - 49 %    MONOCYTES 3 (L) 5 - 13 %    EOSINOPHILS 1 0 - 7 %    BASOPHILS 0 0 - 1 %    MYELOCYTES 3 (H) 0 %    IMMATURE GRANULOCYTES 0 %    ABS. NEUTROPHILS 14.3 (H) 1.8 - 8.0 K/UL    ABS. LYMPHOCYTES 3.4 0.8 - 3.5 K/UL    ABS. MONOCYTES 0.6 0.0 - 1.0 K/UL    ABS. EOSINOPHILS 0.2 0.0 - 0.4 K/UL    ABS. BASOPHILS 0.0 0.0 - 0.1 K/UL    ABS. IMM.  GRANS. 0.0 K/UL    DF Manual      PLATELET COMMENTS Large Platelets      RBC COMMENTS Teardrop cells  1+       METABOLIC PANEL, COMPREHENSIVE    Collection Time: 03/11/22 12:00 AM   Result Value Ref Range    Sodium 142 136 - 145 mmol/L    Potassium 4.4 3.5 - 5.1 mmol/L    Chloride 114 (H) 97 - 108 mmol/L    CO2 12 (LL) 21 - 32 mmol/L    Anion gap 16 (H) 5 - 15 mmol/L    Glucose 86 65 - 100 mg/dL    BUN 44 (H) 6 - 20 mg/dL    Creatinine 1.93 (H) 0.55 - 1.02 mg/dL    BUN/Creatinine ratio 23 (H) 12 - 20      GFR est AA 32 (L) >60 ml/min/1.73m2    GFR est non-AA 27 (L) >60 ml/min/1.73m2    Calcium 8.8 8.5 - 10.1 mg/dL    Bilirubin, total 0.3 0.2 - 1.0 mg/dL    AST (SGOT) 19 15 - 37 U/L    ALT (SGPT) 27 12 - 78 U/L    Alk.  phosphatase 172 (H) 45 - 117 U/L    Protein, total 7.2 6.4 - 8.2 g/dL    Albumin 2.7 (L) 3.5 - 5.0 g/dL    Globulin 4.5 (H) 2.0 - 4.0 g/dL    A-G Ratio 0.6 (L) 1.1 - 2.2     TYPE & SCREEN    Collection Time: 03/11/22 12:00 AM   Result Value Ref Range    Crossmatch Expiration 03/14/2022,2359     ABO/Rh(D) B Positive     Antibody screen Positive     Antibody ID No additional antibodies detected     XXAUTO CONTROL Negative    URINALYSIS W/MICROSCOPIC    Collection Time: 03/11/22  1:30 AM   Result Value Ref Range    Color Yellow/Straw      Appearance Clear Clear      Specific gravity 1.011 1.003 - 1.030      pH (UA) 6.0 5.0 - 8.0      Protein 30 (A) Negative mg/dL    Glucose Negative Negative mg/dL    Ketone Negative Negative mg/dL    Bilirubin Negative Negative      Blood Small (A) Negative      Urobilinogen 0.1 0.1 - 1.0 EU/dL    Nitrites Negative Negative      Leukocyte Esterase Small (A) Negative      WBC 10-20 0 - 4 /hpf    RBC 0-5 0 - 5 /hpf    Bacteria 1+ (A) Negative /hpf    Mucus Trace /lpf   OCCULT BLOOD, STOOL    Collection Time: 03/11/22  1:30 AM   Result Value Ref Range    Occult Blood,day 1 Positive (A) Negative     LACTIC ACID    Collection Time: 03/11/22  1:44 AM   Result Value Ref Range    Lactic acid 0.7 0.4 - 2.0 mmol/L   AMMONIA    Collection Time: 03/11/22  1:44 AM   Result Value Ref Range    Ammonia, plasma 24 <32 umol/L   COVID-19 RAPID TEST    Collection Time: 03/11/22  1:51 AM   Result Value Ref Range    COVID-19 rapid test Not Detected Not Detected     MRSA SCREEN - PCR (NASAL)    Collection Time: 03/11/22  4:00 AM   Result Value Ref Range    MRSA by PCR, Nasal Not Detected Not Detected     BLOOD GAS, ARTERIAL    Collection Time: 03/11/22  6:51 AM   Result Value Ref Range    pH 7.25 (L) 7.35 - 7.45      PCO2 25 (L) 35 - 45 mmHg    PO2 85 75 - 100 mmHg    O2 SAT 97 >95 %    BICARBONATE 13 (L) 22 - 26 mmol/L    BASE DEFICIT 15.0 (H) 0 - 2 mmol/L    O2 METHOD Room air      FIO2 21.0 %    SITE Arterial Line      LUIS'S TEST PASS            Diagnostic Images:  CT Results  (Last 48 hours)    None          Current Medications:    Current Facility-Administered Medications:     calcium carbonate (TUMS) chewable tablet 400 mg [elemental], 400 mg, Oral, Q4H PRN, Tali Schilling MD    PARoxetine (PAXIL) tablet 40 mg, 40 mg, Oral, DAILY, Tali Schilling MD    pregabalin (LYRICA) capsule 150 mg, 150 mg, Oral, BID, Tali Schilling MD    rOPINIRole (REQUIP) tablet 0.25 mg, 0.25 mg, Oral, TID, Ashlyn Quinonez MD    [Held by provider] sodium bicarbonate tablet 1,300 mg, 1,300 mg, Oral, DAILY, Tali Schilling MD    sodium chloride (NS) flush 5-40 mL, 5-40 mL, IntraVENous, Q8H, Tali Schilling MD, 10 mL at 03/11/22 0507    sodium chloride (NS) flush 5-40 mL, 5-40 mL, IntraVENous, PRN, Tali Schilling MD    acetaminophen (TYLENOL) tablet 650 mg, 650 mg, Oral, Q6H PRN **OR** acetaminophen (TYLENOL) suppository 650 mg, 650 mg, Rectal, Q6H PRN, Tali Schilling MD    polyethylene glycol (MIRALAX) packet 17 g, 17 g, Oral, DAILY PRN, Tali Schilling MD    ondansetron (ZOFRAN ODT) tablet 4 mg, 4 mg, Oral, Q8H PRN **OR** ondansetron (ZOFRAN) injection 4 mg, 4 mg, IntraVENous, Q6H PRN, Tali Schilling MD    pantoprazole (PROTONIX) 40 mg in 0.9% sodium chloride 10 mL injection, 40 mg, IntraVENous, Q12H, Tali Schilling MD, 40 mg at 03/11/22 0352    trimethoprim-sulfamethoxazole (BACTRIM DS, SEPTRA DS) 160-800 mg per tablet 1 Tablet, 1 Tablet, Oral, Q12H, Tali Schilling MD    sodium bicarbonate (8.4%) 50 mEq in dextrose 5% 1,000 mL infusion, , IntraVENous, CONTINUOUS, Luis Caldwell NP       ASSESSMENT:    1. GI bleed  -Patient present with above symptomology  -Continue IV PPI  -Consulted GI     2. Acute anemia  -Likely secondary to GI bleed  -No need for transfusion for now.  -H&H every 12 hours, transfuse as needed  -current hgb 8.1, baseline around 10.7     3. Altered mental status  - Likely secondary to acute anemia and metabolic acidosis  - Monitor for now     4. Suspected UTI  - UA not too impressive, Bacteria +1  - Bactrim     5. RTA type 1  - start sodium bicarb gtt  - obtain nephrology eval      6. LOLITA on CKD  7. Metabolic acidosis  - Likely secondary to regular NSAIDs use and acute anemia  - Started sodium bicarb gtt  - Obtain nephrology evaluation        Full Code  Dvt Prophylaxis scd's  GI Prophylaxis protonix  Home medications reviewed and reconciled  Disposition:  -GI and nephrology evaluation  -clinical improvement  ~48hrs      Above treatment plan reviewed and discussed with patient in detail at bedside, all questions answered. Care Plan discussed with: Interdisciplinary team    Total time spent with patient: 35 minutes.     Troy Mclaughlin NP

## 2022-03-11 NOTE — ED NOTES
TRANSFER - OUT REPORT:    Verbal report given to Tiffanie Bill RN (name) on Chadd Clarke  being transferred to [address] [place] [address] 221 (unit) for routine progression of care       Report consisted of patients Situation, Background, Assessment and   Recommendations(SBAR). Information from the following report(s) SBAR was reviewed with the receiving nurse. Lines:   Peripheral IV 03/11/22 Right Antecubital (Active)   Site Assessment Clean, dry, & intact 03/11/22 0005   Dressing Status Clean, dry, & intact 03/11/22 0005   Dressing Type Tape;Transparent 03/11/22 0005   Hub Color/Line Status Pink;Flushed;Patent 03/11/22 0005   Action Taken Blood drawn 03/11/22 0005        Opportunity for questions and clarification was provided.       Patient transported with:   Registered Nurse

## 2022-03-11 NOTE — CONSULTS
Renal Consultation Note    NAME:  Duncan Brink   :   1960   MRN:   [id number]     ATTENDING: Ruel Matos MD  PCP:  Ralf Cohen MD    Date/Time:  3/11/2022 12:36 PM      Subjective:   REQUESTING PHYSICIAN:  REASON FOR CONSULT:    Metabolic acidosis    Patient is a 24-year-old  female with history of COPD anemia depression and renal tubular acidosis type I based on previous work-up, who presented to the hospital because of generalized weakness and altered mentation. Patient herself is very confused so most of the history is obtained from her chart. According to history seems she was also short of breath and had a sore throat for the past few days but no cough or fever. Also noted to have a black tarry stool for the past week. Apparently she also had nausea and vomiting for about 2 weeks which has now resolved. On admission she was noted to have bicarb of 14 which prompted the renal consultation. Creatinine was also elevated at 1.9 on this admission. Apparently patient has been taking chronic NSAIDs at home    She follows with Dr. Leopoldo Patten outpatient for RTA, is on potassium citrate which she claims she has not been taking because of nausea   She had a WBC count of 19,000 on admission. Ammonia only 24  Urine is showing pyuria.  she has been started on Zosyn    Past Medical History:   Diagnosis Date    Anemia     Chronic obstructive pulmonary disease (HCC)     Chronic pain     neck, slipped disc    Ectopic pregnancy     GERD (gastroesophageal reflux disease)     Heart murmur     History of low potassium     Hypokalemia     Ill-defined condition     murmur    Psychiatric disorder     depression      Past Surgical History:   Procedure Laterality Date    HX APPENDECTOMY      HX OTHER SURGICAL      needle removal left arm     Social History     Tobacco Use    Smoking status: Current Every Day Smoker     Packs/day: 0.25    Smokeless tobacco: Never Used   Substance Use Topics    Alcohol use: Never      Family History   Family history unknown: Yes       No Known Allergies   Prior to Admission medications    Medication Sig Start Date End Date Taking? Authorizing Provider   sodium bicarbonate 650 mg tablet Take 2 Tablets by mouth daily. 2/10/22   Provider, Laxmi   calcium carbonate (TUMS) 200 mg calcium (500 mg) chew Take 2 Tablets by mouth every four (4) hours as needed for Other (reflux). 21   Burt DELGADO MD   ferrous sulfate 325 mg (65 mg iron) tablet Take 1 Tab by mouth Daily (before breakfast). 20   Alysia Braswell MD   omeprazole (PriLOSEC OTC) 20 mg tablet Take 1 Tab by mouth daily. 20   Alysia Braswell MD   PARoxetine (PAXIL) 40 mg tablet Take 1 Tab by mouth daily. 20   Alysia Braswell MD   pregabalin (LYRICA) 150 mg capsule Take 1 Cap by mouth two (2) times a day. Max Daily Amount: 300 mg. 20   Alysia Braswell MD   rOPINIRole (REQUIP) 0.25 mg tablet Take 1 Tab by mouth three (3) times daily. 20   Mona Chi MD       REVIEW OF SYSTEMS:       Unable to obtain as patient is very confused    Objective:   VITALS:    Visit Vitals  /68 (BP 1 Location: Right upper arm, BP Patient Position: At rest)   Pulse (!) 102   Temp 98.2 °F (36.8 °C)   Resp 18   Ht 5' (1.524 m)   Wt 81.6 kg (180 lb)   LMP  (LMP Unknown) Comment: menopause   SpO2 97%   Breastfeeding No   BMI 35.15 kg/m²     Temp (24hrs), Av.2 °F (36.8 °C), Min:97.4 °F (36.3 °C), Max:99.5 °F (37.5 °C)      PHYSICAL EXAM:     General: Lethargic  Eyes: sclera anicteric  Oral Cavity: Mucous membranes dry  Neck: no JVD  Chest: Fair bilateral air entry. No Wheezing or Rhonchi. No rales.   Heart: normal sounds  Abdomen: soft and non tender   : no alberts  Lower Extremities: no edema  Skin: no rash  Neuro: Lethargic but responds to questions somewhat   psychiatric: Unable to assess        LAB DATA REVIEWED:    Recent Results (from the past 24 hour(s))   EKG, 12 LEAD, INITIAL    Collection Time: 03/10/22 11:54 PM   Result Value Ref Range    Ventricular Rate 83 BPM    Atrial Rate 83 BPM    P-R Interval 106 ms    QRS Duration 72 ms    Q-T Interval 370 ms    QTC Calculation (Bezet) 434 ms    Calculated P Axis 7 degrees    Calculated R Axis 20 degrees    Calculated T Axis 54 degrees    Diagnosis       Sinus rhythm with short SC  Otherwise normal ECG  When compared with ECG of 25-SEP-2021 18:12,  Nonspecific T wave abnormality, improved in Lateral leads  Confirmed by Theodore Perry ([id number]) on 3/11/2022 9:34:47 AM     CBC WITH AUTOMATED DIFF    Collection Time: 03/11/22 12:00 AM   Result Value Ref Range    WBC 19.1 (H) 3.6 - 11.0 K/uL    RBC 3.09 (L) 3.80 - 5.20 M/uL    HGB 8.1 (L) 11.5 - 16.0 g/dL    HCT 25.9 (L) 35.0 - 47.0 %    MCV 83.8 80.0 - 99.0 FL    MCH 26.2 26.0 - 34.0 PG    MCHC 31.3 30.0 - 36.5 g/dL    RDW 22.6 (H) 11.5 - 14.5 %    PLATELET 383 (H) 284 - 400 K/uL    MPV 12.4 8.9 - 12.9 FL    NRBC 0.2 (H) 0.0  WBC    ABSOLUTE NRBC 0.03 (H) 0.00 - 0.01 K/uL    NEUTROPHILS 75 32 - 75 %    LYMPHOCYTES 18 12 - 49 %    MONOCYTES 3 (L) 5 - 13 %    EOSINOPHILS 1 0 - 7 %    BASOPHILS 0 0 - 1 %    MYELOCYTES 3 (H) 0 %    IMMATURE GRANULOCYTES 0 %    ABS. NEUTROPHILS 14.3 (H) 1.8 - 8.0 K/UL    ABS. LYMPHOCYTES 3.4 0.8 - 3.5 K/UL    ABS. MONOCYTES 0.6 0.0 - 1.0 K/UL    ABS. EOSINOPHILS 0.2 0.0 - 0.4 K/UL    ABS. BASOPHILS 0.0 0.0 - 0.1 K/UL    ABS. IMM.  GRANS. 0.0 K/UL    DF Manual      PLATELET COMMENTS Large Platelets      RBC COMMENTS Teardrop cells  1+       METABOLIC PANEL, COMPREHENSIVE    Collection Time: 03/11/22 12:00 AM   Result Value Ref Range    Sodium 142 136 - 145 mmol/L    Potassium 4.4 3.5 - 5.1 mmol/L    Chloride 114 (H) 97 - 108 mmol/L    CO2 12 (LL) 21 - 32 mmol/L    Anion gap 16 (H) 5 - 15 mmol/L    Glucose 86 65 - 100 mg/dL    BUN 44 (H) 6 - 20 mg/dL    Creatinine 1.93 (H) 0.55 - 1.02 mg/dL    BUN/Creatinine ratio 23 (H) 12 - 20      GFR est AA 32 (L) >60 ml/min/1.73m2    GFR est non-AA 27 (L) >60 ml/min/1.73m2    Calcium 8.8 8.5 - 10.1 mg/dL    Bilirubin, total 0.3 0.2 - 1.0 mg/dL    AST (SGOT) 19 15 - 37 U/L    ALT (SGPT) 27 12 - 78 U/L    Alk.  phosphatase 172 (H) 45 - 117 U/L    Protein, total 7.2 6.4 - 8.2 g/dL    Albumin 2.7 (L) 3.5 - 5.0 g/dL    Globulin 4.5 (H) 2.0 - 4.0 g/dL    A-G Ratio 0.6 (L) 1.1 - 2.2     TYPE & SCREEN    Collection Time: 03/11/22 12:00 AM   Result Value Ref Range    Crossmatch Expiration 03/14/2022,2359     ABO/Rh(D) B Positive     Antibody screen Positive     Antibody ID No additional antibodies detected     XXAUTO CONTROL Negative    URINALYSIS W/MICROSCOPIC    Collection Time: 03/11/22  1:30 AM   Result Value Ref Range    Color Yellow/Straw      Appearance Clear Clear      Specific gravity 1.011 1.003 - 1.030      pH (UA) 6.0 5.0 - 8.0      Protein 30 (A) Negative mg/dL    Glucose Negative Negative mg/dL    Ketone Negative Negative mg/dL    Bilirubin Negative Negative      Blood Small (A) Negative      Urobilinogen 0.1 0.1 - 1.0 EU/dL    Nitrites Negative Negative      Leukocyte Esterase Small (A) Negative      WBC 10-20 0 - 4 /hpf    RBC 0-5 0 - 5 /hpf    Bacteria 1+ (A) Negative /hpf    Mucus Trace /lpf   OCCULT BLOOD, STOOL    Collection Time: 03/11/22  1:30 AM   Result Value Ref Range    Occult Blood,day 1 Positive (A) Negative     LACTIC ACID    Collection Time: 03/11/22  1:44 AM   Result Value Ref Range    Lactic acid 0.7 0.4 - 2.0 mmol/L   AMMONIA    Collection Time: 03/11/22  1:44 AM   Result Value Ref Range    Ammonia, plasma 24 <32 umol/L   C REACTIVE PROTEIN, QT    Collection Time: 03/11/22  1:44 AM   Result Value Ref Range    C-Reactive protein 14.60 (H) 0.00 - 0.60 mg/dL   COVID-19 RAPID TEST    Collection Time: 03/11/22  1:51 AM   Result Value Ref Range    COVID-19 rapid test Not Detected Not Detected     MRSA SCREEN - PCR (NASAL)    Collection Time: 03/11/22  4:00 AM Result Value Ref Range    MRSA by PCR, Nasal Not Detected Not Detected     BLOOD GAS, ARTERIAL    Collection Time: 03/11/22  6:51 AM   Result Value Ref Range    pH 7.25 (L) 7.35 - 7.45      PCO2 25 (L) 35 - 45 mmHg    PO2 85 75 - 100 mmHg    O2 SAT 97 >95 %    BICARBONATE 13 (L) 22 - 26 mmol/L    BASE DEFICIT 15.0 (H) 0 - 2 mmol/L    O2 METHOD Room air      FIO2 21.0 %    SITE Arterial Line      LUIS'S TEST PASS     PROCALCITONIN    Collection Time: 03/11/22  8:30 AM   Result Value Ref Range    Procalcitonin 384.22 (H) 0 ng/mL   GLUCOSE, POC    Collection Time: 03/11/22  9:04 AM   Result Value Ref Range    Glucose (POC) 88 65 - 117 mg/dL    Performed by Ivy Homans        Recommendations/Plan:        1.  Acute kidney injury:  -Probably prerenal azotemia secondary to NSAIDs and presence of dehydration and decreased oral intake  -Creatinine was 1.9 on admission . He has no history of CKD has multiple LOLITA  -Advised her to not take NSAIDs at home  -Urine is suggestive of UTI  -We will continue IV fluids    2.  Metabolic acidosis;   -gap acidosis + this admission which goes against RTA . New metabolic acidosis because of acute kidney injury  -New metabolic acidosis not secondary to NSAIDs as that would cause RTA 4  -He has history of type I RTA, based on work-up outpatient.  -We will add potassium citrate 10 mEq tid  -She has been started on bicarbonate drip with 50 meq of bicarb which have been increased to 150 meq of bicarb since her CO2 is only 12  -montinue to monitor CO2 levels and potassium levels     3.  Altered mentation  -She presented with altered mentation.   Could be from UTI  -Ammonia was normal     4.  Osteoarthritis/neck pains: Appears to be chronic   -NSAIDs were recently discontinued but she has restarted them again at home    Thank you for the consultation         ________________________________________________________________________  Signed: Sheryle Ken, MD

## 2022-03-11 NOTE — ED NOTES
Pt presented to the ER from home with her  for multiple complaints. Pt appears pale and weak. Pt awake but is at times low to answer questions. Pt was uncertain of her location or time. Pt states her primary complaint is sore throat, weakness and feeling lethargic. Pt fell out of bed at home prior to arrival. Pt was examined by RN and NP. Fecal occult blood specimen obtained by NP. IV was established and blood sent along with an EKG in triage. Pt vitals are stable. Pt has reported a hx of hypokalemia, NVD x 1 week and anemia. Will continue to observe on cardiac monitor and frequent hemodynamic monitoring. Results pending.

## 2022-03-11 NOTE — PROGRESS NOTES
113  CM attempted to meet with patient at bedside to discuss dc planning. Patient verified her name, then dozed off to sleep. Patient instructed writer to contact spouse for additional assessment. Writer placed call to spouse, Letha Lazo @ [phone], No calls are being accepted at this number. Writer also called the other number listed [phone], same message received from this number also. Writer will f/u later to see if anything changes with the numbers or the patient. [address] asked primary nurse Nicholas if patient has been communicating with her, she reported patient has been lethargic all day. 1415  Attempted to speak with patient at the bedside, while she was in/out of consciousness. Patient verbalized her address which is correct in the record, however she gave a phone number [phone]. Writer dialed that number and it is not in service.      [address], [address]

## 2022-03-11 NOTE — PROGRESS NOTES
Physician Progress Note      PATIENT:               Carissa Peterson  CSN #:                  [id number]  :                       1960  ADMIT DATE:       3/10/2022 11:52 PM  [address] [place] DATE:  RESPONDING  PROVIDER #:        Terra Meza NP [address] [facility] NP          QUERY TEXT:    Pt admitted with GI bleed. Pt noted to have altered mental status. If possible, please document in the progress notes and discharge summary if you are evaluating and / or treating any of the following: The medical record reflects the following:  Risk Factors: 62 yo female with GI bleed, UTI, LOLITA  Clinical Indicators: Patien presented with confusion- GCS of 14; Creatinine 1.9,  Hgb 7.1  Treatment: IV Zosyn    Thank you,  Mary Jane Nicholas RN, CCDS  Options provided:  -- Metabolic encephalopathy  -- Septic encephalopathy  -- Toxic encephalopathy  -- Delirium  -- Other - I will add my own diagnosis  -- Disagree - Not applicable / Not valid  -- Disagree - Clinically unable to determine / Unknown  -- Refer to Clinical Documentation Reviewer    PROVIDER RESPONSE TEXT:    This patient has metabolic encephalopathy.     Query created by: Ernestina Garland on 3/11/2022 2:53 PM      Electronically signed by:  Terra Meza NP [address] [facility] NP 3/11/2022 5:37 PM

## 2022-03-11 NOTE — PROGRESS NOTES
Reason for Admission:   AMS                    RUR Score:   17%               PCP: First and Last name:   DR. Andres Delgado ([id number], [phone]     Name of Practice:    Are you a current patient: Yes/No:  YES   Approximate date of last visit:    Can you participate in a virtual visit if needed:     Do you (patient/family) have any concerns for transition/discharge? NO ISSUES              Plan for utilizing home health:   IF NEEDED    Current Advanced Directive/Advance Care Plan:  Full Code      Healthcare Decision Maker:   Click here to complete HealthCare Decision Makers including selection of the Healthcare Decision Maker Relationship (ie \"Primary\")            Primary Decision Maker: [id number] - [phone]    Transition of Care Plan:          CM met with patient attempted to meet with the patient at the bedside, unable to complete assessment r/t patient is lethargic and intermitingly confused. Call placed to spouse Leeann Sanchez @ [phone] and spoke with spouse. Spouse verified demos. Prior to admission, patient was independent with all of her adl's and iadl's and transported self to [facility]. Spouse will transport patient at [place]. Prior to admission, patient did not need DME, IRF, or , SNF. Patient obtains scripts from [facility] in [place]. At [place], spouse will transport patient home. Primary Care MD is  DR. Andres Delgado [id number], [phone] [id number] [facility]    Discharge dispo: TBD    [address], [address] [place] [address]

## 2022-03-11 NOTE — PROGRESS NOTES
DR Tamie Reed and Kait Glass \"hospitalist\" were notified patient PH is 7.25, PCO@ 25, and Biacrb 13.

## 2022-03-11 NOTE — ED TRIAGE NOTES
Patient states she thinks she is dehydrated; pt states she is delirious, sore throat, headache, fatigue, generalized weakness and bilateral ear pain

## 2022-03-11 NOTE — PROGRESS NOTES
Problem: Falls - Risk of  Goal: *Absence of Falls  Description: Document Cindy Brandtter Fall Risk and appropriate interventions in the flowsheet. Outcome: Progressing Towards Goal  Note: Fall Risk Interventions:       Mentation Interventions: Adequate sleep, hydration, pain control,Bed/chair exit alarm,Door open when patient unattended,Increase mobility,More frequent rounding,Reorient patient,Room close to nurse's station    Medication Interventions: Bed/chair exit alarm,Patient to call before getting OOB,Teach patient to arise slowly    Elimination Interventions: Bed/chair exit alarm,Call light in reach,Patient to call for help with toileting needs    History of Falls Interventions: Bed/chair exit alarm,Door open when patient unattended,Room close to nurse's station         Problem: Patient Education: Go to Patient Education Activity  Goal: Patient/Family Education  Outcome: Progressing Towards Goal     Problem: Pressure Injury - Risk of  Goal: *Prevention of pressure injury  Description: Document David Scale and appropriate interventions in the flowsheet. Outcome: Progressing Towards Goal  Note: Pressure Injury Interventions:             Activity Interventions: Assess need for specialty bed,Increase time out of bed    Mobility Interventions: Assess need for specialty bed,Float heels,HOB 30 degrees or less    Nutrition Interventions: Document food/fluid/supplement intake                     Problem: Patient Education: Go to Patient Education Activity  Goal: Patient/Family Education  Outcome: Progressing Towards Goal

## 2022-03-11 NOTE — ED PROVIDER NOTES
EMERGENCY DEPARTMENT HISTORY AND PHYSICAL EXAM      Date: 3/10/2022  Patient Name: Jacy Beach      History of Presenting Illness     Chief Complaint   Patient presents with    Fatigue       History Provided By: Patient and Patient's     HPI: Jacy Beach, 63 y.o. female with a past medical history significant for COPD, anemia and renal tubular acidosis presents to the emergency room today with complaints of generalized weakness, sore throat and ear pain. Patient states she contacted her PCP yesterday regarding her symptoms and was started on 2 medications though she is unsure of the names. She is accompanied by her  who reports noting intermittent altered mental status over the last several days.  states patient had N/V/D last week, none recently. Patient is alert, oriented to name and place, slow to respond to questions. She reports feeling fatigued and dizzy with some SOB. She reports chronic poor appetite, denies abdominal pain, urinary symptoms or fevers. She admits to a history of anemia, does not take iron as prescribed. She reports black stools since last week. She admits to taking meloxicam, Excedrin regularly and possibly Aleve/ibuprofen though she is unsure. She denies any associated chest pain or palpitations. Records reviewed noting she has had several admissions with diagnosis of renal tubular acidosis likely secondary to chronic NSAID use. Unable to get much detail from patient regarding, she did not follow-up with nephrology as advised. There are no other complaints, changes, or physical findings at this time. PCP: Lesvia Good MD    Current Outpatient Medications   Medication Sig Dispense Refill    sodium bicarbonate 650 mg tablet Take 2 Tablets by mouth daily.  calcium carbonate (TUMS) 200 mg calcium (500 mg) chew Take 2 Tablets by mouth every four (4) hours as needed for Other (reflux).  30 Tablet 0    ferrous sulfate 325 mg (65 mg iron) tablet Take 1 Tab by mouth Daily (before breakfast). 30 Tab 0    omeprazole (PriLOSEC OTC) 20 mg tablet Take 1 Tab by mouth daily. 30 Tab 0    PARoxetine (PAXIL) 40 mg tablet Take 1 Tab by mouth daily. 30 Tab 0    pregabalin (LYRICA) 150 mg capsule Take 1 Cap by mouth two (2) times a day. Max Daily Amount: 300 mg. 60 Cap 0    rOPINIRole (REQUIP) 0.25 mg tablet Take 1 Tab by mouth three (3) times daily. 80 Tab 0       Past History     Past Medical History:  Past Medical History:   Diagnosis Date    Anemia     Chronic obstructive pulmonary disease (HCC)     Chronic pain     neck, slipped disc    Ectopic pregnancy     GERD (gastroesophageal reflux disease)     Heart murmur     History of low potassium     Hypokalemia     Ill-defined condition     murmur    Psychiatric disorder     depression       Past Surgical History:  Past Surgical History:   Procedure Laterality Date    HX APPENDECTOMY      HX OTHER SURGICAL      needle removal left arm       Family History:  Family History   Family history unknown: Yes       Social History:  Social History     Tobacco Use    Smoking status: Current Every Day Smoker     Packs/day: 0.25    Smokeless tobacco: Never Used   Substance Use Topics    Alcohol use: Never    Drug use: Never       Allergies:  No Known Allergies      Review of Systems     Review of Systems   Constitutional: Positive for appetite change and fatigue. Negative for chills and fever. HENT: Positive for ear pain and sore throat. Negative for trouble swallowing. Respiratory: Positive for shortness of breath. Cardiovascular: Negative. Negative for chest pain and palpitations. Gastrointestinal: Positive for blood in stool. Negative for abdominal pain, diarrhea, nausea and vomiting. Genitourinary: Negative. Negative for dysuria. Neurological: Positive for dizziness and weakness. Negative for headaches. All other systems reviewed and are negative.       Physical Exam     Physical Exam  Vitals and nursing note reviewed. Constitutional:       General: She is not in acute distress. Appearance: She is ill-appearing. HENT:      Head: Normocephalic and atraumatic. Right Ear: A middle ear effusion is present. Tympanic membrane is not erythematous. Left Ear: A middle ear effusion is present. Tympanic membrane is not erythematous. Nose: Nose normal.      Mouth/Throat:      Lips: Pink. Mouth: Mucous membranes are dry. Pharynx: Uvula midline. Posterior oropharyngeal erythema present. No pharyngeal swelling, oropharyngeal exudate or uvula swelling. Tonsils: No tonsillar exudate or tonsillar abscesses. Eyes:      Extraocular Movements: Extraocular movements intact. Conjunctiva/sclera: Conjunctivae normal.      Pupils: Pupils are equal, round, and reactive to light. Cardiovascular:      Rate and Rhythm: Regular rhythm. Tachycardia present. Heart sounds: Normal heart sounds. Pulmonary:      Effort: Pulmonary effort is normal. No tachypnea or accessory muscle usage. Breath sounds: Normal breath sounds. No wheezing or rales. Abdominal:      General: Bowel sounds are normal.      Palpations: Abdomen is soft. Tenderness: There is no abdominal tenderness. There is no right CVA tenderness or left CVA tenderness. Musculoskeletal:         General: Normal range of motion. Cervical back: Neck supple. Right lower leg: No edema. Left lower leg: No edema. Lymphadenopathy:      Cervical: Cervical adenopathy present. Skin:     General: Skin is warm and dry. Coloration: Skin is pale. Neurological:      Mental Status: She is alert. She is confused. GCS: GCS eye subscore is 4. GCS verbal subscore is 4. GCS motor subscore is 6. Cranial Nerves: Cranial nerves are intact. Sensory: Sensation is intact. Motor: Weakness present.          Lab and Diagnostic Study Results     Labs -     Recent Results (from the past 12 hour(s))   CBC WITH AUTOMATED DIFF    Collection Time: 03/11/22 12:00 AM   Result Value Ref Range    WBC 19.1 (H) 3.6 - 11.0 K/uL    RBC 3.09 (L) 3.80 - 5.20 M/uL    HGB 8.1 (L) 11.5 - 16.0 g/dL    HCT 25.9 (L) 35.0 - 47.0 %    MCV 83.8 80.0 - 99.0 FL    MCH 26.2 26.0 - 34.0 PG    MCHC 31.3 30.0 - 36.5 g/dL    RDW 22.6 (H) 11.5 - 14.5 %    PLATELET 800 (H) 087 - 400 K/uL    MPV 12.4 8.9 - 12.9 FL    NRBC 0.2 (H) 0.0  WBC    ABSOLUTE NRBC 0.03 (H) 0.00 - 0.01 K/uL    NEUTROPHILS 75 32 - 75 %    LYMPHOCYTES 18 12 - 49 %    MONOCYTES 3 (L) 5 - 13 %    EOSINOPHILS 1 0 - 7 %    BASOPHILS 0 0 - 1 %    MYELOCYTES 3 (H) 0 %    IMMATURE GRANULOCYTES 0 %    ABS. NEUTROPHILS 14.3 (H) 1.8 - 8.0 K/UL    ABS. LYMPHOCYTES 3.4 0.8 - 3.5 K/UL    ABS. MONOCYTES 0.6 0.0 - 1.0 K/UL    ABS. EOSINOPHILS 0.2 0.0 - 0.4 K/UL    ABS. BASOPHILS 0.0 0.0 - 0.1 K/UL    ABS. IMM. GRANS. 0.0 K/UL    DF Manual      PLATELET COMMENTS Large Platelets      RBC COMMENTS Teardrop cells  1+       METABOLIC PANEL, COMPREHENSIVE    Collection Time: 03/11/22 12:00 AM   Result Value Ref Range    Sodium 142 136 - 145 mmol/L    Potassium 4.4 3.5 - 5.1 mmol/L    Chloride 114 (H) 97 - 108 mmol/L    CO2 12 (LL) 21 - 32 mmol/L    Anion gap 16 (H) 5 - 15 mmol/L    Glucose 86 65 - 100 mg/dL    BUN 44 (H) 6 - 20 mg/dL    Creatinine 1.93 (H) 0.55 - 1.02 mg/dL    BUN/Creatinine ratio 23 (H) 12 - 20      GFR est AA 32 (L) >60 ml/min/1.73m2    GFR est non-AA 27 (L) >60 ml/min/1.73m2    Calcium 8.8 8.5 - 10.1 mg/dL    Bilirubin, total 0.3 0.2 - 1.0 mg/dL    AST (SGOT) 19 15 - 37 U/L    ALT (SGPT) 27 12 - 78 U/L    Alk.  phosphatase 172 (H) 45 - 117 U/L    Protein, total 7.2 6.4 - 8.2 g/dL    Albumin 2.7 (L) 3.5 - 5.0 g/dL    Globulin 4.5 (H) 2.0 - 4.0 g/dL    A-G Ratio 0.6 (L) 1.1 - 2.2     URINALYSIS W/MICROSCOPIC    Collection Time: 03/11/22  1:30 AM   Result Value Ref Range    Color Yellow/Straw      Appearance Clear Clear      Specific gravity 1.011 1.003 - 1.030      pH (UA) 6.0 5.0 - 8.0      Protein 30 (A) Negative mg/dL    Glucose Negative Negative mg/dL    Ketone Negative Negative mg/dL    Bilirubin Negative Negative      Blood Small (A) Negative      Urobilinogen 0.1 0.1 - 1.0 EU/dL    Nitrites Negative Negative      Leukocyte Esterase Small (A) Negative      WBC 10-20 0 - 4 /hpf    RBC 0-5 0 - 5 /hpf    Bacteria 1+ (A) Negative /hpf    Mucus Trace /lpf   OCCULT BLOOD, STOOL    Collection Time: 03/11/22  1:30 AM   Result Value Ref Range    Occult Blood,day 1 Positive (A) Negative     COVID-19 RAPID TEST    Collection Time: 03/11/22  1:51 AM   Result Value Ref Range    COVID-19 rapid test Not Detected Not Detected         Radiologic Studies -   [unfilled]  CT Results  (Last 48 hours)    None        CXR Results  (Last 48 hours)    None          Medical Decision Making and ED Course   - I am the first and primary provider for this patient AND AM THE PRIMARY PROVIDER OF RECORD. - I reviewed the vital signs, available nursing notes, past medical history, past surgical history, family history and social history. - Initial assessment performed. The patients presenting problems have been discussed, and the staff are in agreement with the care plan formulated and outlined with them. I have encouraged them to ask questions as they arise throughout their visit. Vital Signs-Reviewed the patient's vital signs. Patient Vitals for the past 12 hrs:   Temp Pulse Resp BP SpO2   03/11/22 0227  88 19 130/79 99 %   03/11/22 0045  95 16 (!) 144/83 99 %   03/10/22 2348 97.6 °F (36.4 °C) 84 20 114/62 99 %       EKG interpretation: (Preliminary): Performed at 2354, and read at 2355  Rhythm: normal sinus rhythm; and regular .  Rate (approx.): 83; Axis: normal; WV interval: shortened; QRS interval: normal ; ST/T wave: normal; Other findings: normal.      Records Reviewed: Nursing Notes, Old Medical Records and Previous Laboratory Studies    The patient presents with altered mental status with a differential diagnosis of  ETOH intoxication, encephalopathy, hyponatremia, hypoxia, UTI and volume depletion    ED Course:   Presents with AMS with worsening lethargy over the last several days. Recent history of N/V, poor PO intake and black stools. Patient is ill-appearing, vital signs stableafebrile, mild tachycardia improved with IVF. Labs: WBC 19.1, Hgb 8.1 baseline of 10, creatinine 1.93, BUN 44, anion gap acidosis - CO2 12, anion gap 16,  chart review indicates previous diagnosis of RTA type 1; lactic acid normal, ammonia normal.  CXR, UA pending. Hemoccult positive, no abdominal pain. Suspect anemia secondary to GI bleeding/NSAID use. Will admit to hospitalist for further work-up      ED Course as of 03/11/22 0256   Fri Mar 11, 2022   0145 Patient and spouse updated on results and plan of care [LP]      ED Course User Index  [LP] Cyn Collins NP         Provider Notes (Medical Decision Making):     MDM  Number of Diagnoses or Management Options  LOLITA (acute kidney injury) [facility]): new, needed workup  Altered mental status, unspecified altered mental status type: new, needed workup  Gastrointestinal hemorrhage, unspecified gastrointestinal hemorrhage type: new, needed workup     Amount and/or Complexity of Data Reviewed  Clinical lab tests: ordered and reviewed  Tests in the radiology section of CPT®: ordered and reviewed  Obtain history from someone other than the patient: yes  Review and summarize past medical records: yes  Discuss the patient with other providers: yes    Risk of Complications, Morbidity, and/or Mortality  Presenting problems: moderate  Diagnostic procedures: moderate  Management options: moderate    Patient Progress  Patient progress: stable             Consultations:       Consultations: -  Hospitalist Consultant: Dr. Missy Ball: We have asked for emergent assistance with regard to this patient.  We have discussed the patients HPI, ROS, PE and results this far.  They will come and evaluate the patient for admission. Disposition     Disposition: Admitted to Floor Stepdown Unit the case was discussed with the admitting physician Tonie    Admitted    Diagnosis     Clinical Impression:   1. LOLITA (acute kidney injury) ([facility] [address]    2. Altered mental status, unspecified altered mental status type    3. Gastrointestinal hemorrhage, unspecified gastrointestinal hemorrhage type        Attestations:    Judy Coats NP    Please note that this dictation was completed with [employer], the computer voice recognition software. Quite often unanticipated grammatical, syntax, homophones, and other interpretive errors are inadvertently transcribed by the computer software. Please disregard these errors. Please excuse any errors that have escaped final proofreading. Thank you.

## 2022-03-11 NOTE — PROGRESS NOTES
Problem: Falls - Risk of  Goal: *Absence of Falls  Description: Document Kath Coleman Fall Risk and appropriate interventions in the flowsheet. Outcome: Progressing Towards Goal  Note: Fall Risk Interventions:       Mentation Interventions: Adequate sleep, hydration, pain control    Medication Interventions: Bed/chair exit alarm    Elimination Interventions: Bed/chair exit alarm    History of Falls Interventions: Bed/chair exit alarm         Problem: Patient Education: Go to Patient Education Activity  Goal: Patient/Family Education  Outcome: Progressing Towards Goal     Problem: Pressure Injury - Risk of  Goal: *Prevention of pressure injury  Description: Document David Scale and appropriate interventions in the flowsheet. Outcome: Progressing Towards Goal  Note: Pressure Injury Interventions:             Activity Interventions: Assess need for specialty bed    Mobility Interventions: Assess need for specialty bed    Nutrition Interventions: Document food/fluid/supplement intake                     Problem: Patient Education: Go to Patient Education Activity  Goal: Patient/Family Education  Outcome: Progressing Towards Goal

## 2022-03-11 NOTE — CONSULTS
Gastroenterology Consult     Referring Physician: Roney Herrera MD     Consult Date: 3/11/2022     Subjective:     Chief Complaint: Altered mental status and generalized weakness. History of Present Illness: Mary Moulton is a 63 y.o. female who is seen in consultation for GI bleed - dark stools. Patient was admitted to the hospital with complain of altered mental status and generalized weakness. Patient is a poor historian. History gathered from chart documentation. Patient reportedly fell out of bed prior to arrival. Family reported patient has been having intermittent altered mental status over the past few days. She had nausea, vomiting, and diarrhea last week. Reported having dark liquid stools for the past week. She denies use of blood thinners but admits to regular NSAIDs use. She had several admissions with diagnosis of renal tubular acidosis most likely from chronic NSAID use. Denies acid reflux or heartburn.     -Initial workup in the ED shows an elevated WBC 19. 1, hgb 8. 1. creatinine 1.93. Stool occult blood positive. CXR showed No consolidation in the lungs. Hiatal hernia. Patient seen in room lethargic. She would open her eyes briefly then doze off. Denies nausea, vomiting, or abdominal pain. Latest hgb today is 7.1 creatinine 1.93, Procalcitonin 384.22.        Past Medical History:   Diagnosis Date    Anemia     Chronic obstructive pulmonary disease (HCC)     Chronic pain     neck, slipped disc    Ectopic pregnancy     GERD (gastroesophageal reflux disease)     Heart murmur     History of low potassium     Hypokalemia     Ill-defined condition     murmur    Psychiatric disorder     depression     Past Surgical History:   Procedure Laterality Date    HX APPENDECTOMY      HX OTHER SURGICAL      needle removal left arm      Family History   Family history unknown: Yes     Social History     Tobacco Use    Smoking status: Current Every Day Smoker     Packs/day: 0.25    Smokeless tobacco: Never Used   Substance Use Topics    Alcohol use: Never      No Known Allergies  Current Facility-Administered Medications   Medication Dose Route Frequency    calcium carbonate (TUMS) chewable tablet 400 mg [elemental]  400 mg Oral Q4H PRN    PARoxetine (PAXIL) tablet 40 mg  40 mg Oral DAILY    pregabalin (LYRICA) capsule 150 mg  150 mg Oral BID    rOPINIRole (REQUIP) tablet 0.25 mg  0.25 mg Oral TID    [Held by provider] sodium bicarbonate tablet 1,300 mg  1,300 mg Oral DAILY    sodium chloride (NS) flush 5-40 mL  5-40 mL IntraVENous Q8H    sodium chloride (NS) flush 5-40 mL  5-40 mL IntraVENous PRN    acetaminophen (TYLENOL) tablet 650 mg  650 mg Oral Q6H PRN    Or    acetaminophen (TYLENOL) suppository 650 mg  650 mg Rectal Q6H PRN    polyethylene glycol (MIRALAX) packet 17 g  17 g Oral DAILY PRN    ondansetron (ZOFRAN ODT) tablet 4 mg  4 mg Oral Q8H PRN    Or    ondansetron (ZOFRAN) injection 4 mg  4 mg IntraVENous Q6H PRN    pantoprazole (PROTONIX) 40 mg in 0.9% sodium chloride 10 mL injection  40 mg IntraVENous Q12H    piperacillin-tazobactam (ZOSYN) 3.375 g in 0.9% sodium chloride (MBP/ADV) 100 mL MBP  3.375 g IntraVENous Q8H    benzocaine-menthoL (CEPACOL) lozenge 1 Lozenge  1 Lozenge Mucous Membrane PRN    potassium citrate (UROCIT-K5) tablet 10 mEq  10 mEq Oral TID    sodium bicarbonate (8.4%) 150 mEq in dextrose 5% 1,000 mL infusion   IntraVENous CONTINUOUS        Review of Systems:  A detailed 10 organ review of systems is obtained with pertinent positives as listed in the History of Present Illness and Past Medical History. All others are negative.     Objective:     Physical Exam:  Visit Vitals  /68 (BP 1 Location: Right upper arm, BP Patient Position: At rest)   Pulse (!) 102   Temp 98.2 °F (36.8 °C)   Resp 18   Ht 5' (1.524 m)   Wt 81.6 kg (180 lb)   LMP  (LMP Unknown) Comment: menopause   SpO2 97%   Breastfeeding No   BMI 35.15 kg/m²        Skin: Extremities and face reveal no rashes. No cutler erythema. No telangiectasias on the chest wall. HEENT: Sclerae anicteric. Extra-occular muscles are intact. No oral ulcers. No abnormal pigmentation of the lips. The neck is supple. Cardiovascular: Regular rate and rhythm. No murmurs, gallops, or rubs. PMI nondisplaced. Carotids without bruits. Respiratory:  Comfortable breathing with no accessory muscle use. Clear breath sounds with no wheezes, rales, or rhonchi. GI:  Abdomen nondistended, soft, and nontender. Normal active bowel sounds. No enlargement of the liver or spleen. No masses palpable. Rectal:  Deferred  Musculoskeletal:  Generalized weakness  Neurological:  Gross memory appears intact. Patient is alert and oriented, somewhat disoriented. Psychiatric:  Mood appears appropriate with judgement intact. Lymphatic:  No cervical or supraclavicular adenopathy.     Lab/Data Review:  Recent Results (from the past 24 hour(s))   EKG, 12 LEAD, INITIAL    Collection Time: 03/10/22 11:54 PM   Result Value Ref Range    Ventricular Rate 83 BPM    Atrial Rate 83 BPM    P-R Interval 106 ms    QRS Duration 72 ms    Q-T Interval 370 ms    QTC Calculation (Bezet) 434 ms    Calculated P Axis 7 degrees    Calculated R Axis 20 degrees    Calculated T Axis 54 degrees    Diagnosis       Sinus rhythm with short DC  Otherwise normal ECG  When compared with ECG of 25-SEP-2021 18:12,  Nonspecific T wave abnormality, improved in Lateral leads  Confirmed by Theodore Perry ([id number]) on 3/11/2022 9:34:47 AM     CBC WITH AUTOMATED DIFF    Collection Time: 03/11/22 12:00 AM   Result Value Ref Range    WBC 19.1 (H) 3.6 - 11.0 K/uL    RBC 3.09 (L) 3.80 - 5.20 M/uL    HGB 8.1 (L) 11.5 - 16.0 g/dL    HCT 25.9 (L) 35.0 - 47.0 %    MCV 83.8 80.0 - 99.0 FL    MCH 26.2 26.0 - 34.0 PG    MCHC 31.3 30.0 - 36.5 g/dL    RDW 22.6 (H) 11.5 - 14.5 %    PLATELET 792 (H) 666 - 400 K/uL    MPV 12.4 8.9 - 12.9 FL    NRBC 0.2 (H) 0.0  WBC ABSOLUTE NRBC 0.03 (H) 0.00 - 0.01 K/uL    NEUTROPHILS 75 32 - 75 %    LYMPHOCYTES 18 12 - 49 %    MONOCYTES 3 (L) 5 - 13 %    EOSINOPHILS 1 0 - 7 %    BASOPHILS 0 0 - 1 %    MYELOCYTES 3 (H) 0 %    IMMATURE GRANULOCYTES 0 %    ABS. NEUTROPHILS 14.3 (H) 1.8 - 8.0 K/UL    ABS. LYMPHOCYTES 3.4 0.8 - 3.5 K/UL    ABS. MONOCYTES 0.6 0.0 - 1.0 K/UL    ABS. EOSINOPHILS 0.2 0.0 - 0.4 K/UL    ABS. BASOPHILS 0.0 0.0 - 0.1 K/UL    ABS. IMM. GRANS. 0.0 K/UL    DF Manual      PLATELET COMMENTS Large Platelets      RBC COMMENTS Teardrop cells  1+       METABOLIC PANEL, COMPREHENSIVE    Collection Time: 03/11/22 12:00 AM   Result Value Ref Range    Sodium 142 136 - 145 mmol/L    Potassium 4.4 3.5 - 5.1 mmol/L    Chloride 114 (H) 97 - 108 mmol/L    CO2 12 (LL) 21 - 32 mmol/L    Anion gap 16 (H) 5 - 15 mmol/L    Glucose 86 65 - 100 mg/dL    BUN 44 (H) 6 - 20 mg/dL    Creatinine 1.93 (H) 0.55 - 1.02 mg/dL    BUN/Creatinine ratio 23 (H) 12 - 20      GFR est AA 32 (L) >60 ml/min/1.73m2    GFR est non-AA 27 (L) >60 ml/min/1.73m2    Calcium 8.8 8.5 - 10.1 mg/dL    Bilirubin, total 0.3 0.2 - 1.0 mg/dL    AST (SGOT) 19 15 - 37 U/L    ALT (SGPT) 27 12 - 78 U/L    Alk.  phosphatase 172 (H) 45 - 117 U/L    Protein, total 7.2 6.4 - 8.2 g/dL    Albumin 2.7 (L) 3.5 - 5.0 g/dL    Globulin 4.5 (H) 2.0 - 4.0 g/dL    A-G Ratio 0.6 (L) 1.1 - 2.2     TYPE & SCREEN    Collection Time: 03/11/22 12:00 AM   Result Value Ref Range    Crossmatch Expiration 03/14/2022,2359     ABO/Rh(D) B Positive     Antibody screen Positive     Antibody ID No additional antibodies detected     XXAUTO CONTROL Negative    URINALYSIS W/MICROSCOPIC    Collection Time: 03/11/22  1:30 AM   Result Value Ref Range    Color Yellow/Straw      Appearance Clear Clear      Specific gravity 1.011 1.003 - 1.030      pH (UA) 6.0 5.0 - 8.0      Protein 30 (A) Negative mg/dL    Glucose Negative Negative mg/dL    Ketone Negative Negative mg/dL    Bilirubin Negative Negative      Blood Small (A) Negative      Urobilinogen 0.1 0.1 - 1.0 EU/dL    Nitrites Negative Negative      Leukocyte Esterase Small (A) Negative      WBC 10-20 0 - 4 /hpf    RBC 0-5 0 - 5 /hpf    Bacteria 1+ (A) Negative /hpf    Mucus Trace /lpf   OCCULT BLOOD, STOOL    Collection Time: 03/11/22  1:30 AM   Result Value Ref Range    Occult Blood,day 1 Positive (A) Negative     LACTIC ACID    Collection Time: 03/11/22  1:44 AM   Result Value Ref Range    Lactic acid 0.7 0.4 - 2.0 mmol/L   AMMONIA    Collection Time: 03/11/22  1:44 AM   Result Value Ref Range    Ammonia, plasma 24 <32 umol/L   C REACTIVE PROTEIN, QT    Collection Time: 03/11/22  1:44 AM   Result Value Ref Range    C-Reactive protein 14.60 (H) 0.00 - 0.60 mg/dL   COVID-19 RAPID TEST    Collection Time: 03/11/22  1:51 AM   Result Value Ref Range    COVID-19 rapid test Not Detected Not Detected     MRSA SCREEN - PCR (NASAL)    Collection Time: 03/11/22  4:00 AM   Result Value Ref Range    MRSA by PCR, Nasal Not Detected Not Detected     BLOOD GAS, ARTERIAL    Collection Time: 03/11/22  6:51 AM   Result Value Ref Range    pH 7.25 (L) 7.35 - 7.45      PCO2 25 (L) 35 - 45 mmHg    PO2 85 75 - 100 mmHg    O2 SAT 97 >95 %    BICARBONATE 13 (L) 22 - 26 mmol/L    BASE DEFICIT 15.0 (H) 0 - 2 mmol/L    O2 METHOD Room air      FIO2 21.0 %    SITE Arterial Line      LUIS'S TEST PASS     PROCALCITONIN    Collection Time: 03/11/22  8:30 AM   Result Value Ref Range    Procalcitonin 384.22 (H) 0 ng/mL   GLUCOSE, POC    Collection Time: 03/11/22  9:04 AM   Result Value Ref Range    Glucose (POC) 88 65 - 117 mg/dL    Performed by Liz Mccall    HGB & HCT    Collection Time: 03/11/22  1:35 PM   Result Value Ref Range    HGB 7.1 (L) 11.5 - 16.0 g/dL    HCT 22.7 (L) 35.0 - 47.0 %        XR CHEST PORT   Final Result      No consolidation in the lungs. Hiatal hernia. Follow-up as clinically indicated. Assessment/Plan:   1. GI bleed - dark stool    -Stool occult positive    -Hgb 7.1. Monitor hgb and transfuse as needed.    -continue PPI    -repeat CBC in am.    -Schedule for EGD in am. Please get consent. NPO post midnight. 2. Leukocytosis     -WBC 19.1     -Urine showing pyuria     -Currently on Zosyn  3. Acute anemia     -most likely d/t GI bleed     -Hgb 7.1. Monitor and transfuse to keep >7.0     -For EGD in am.  4. LOLITA on CKD     -creatinine elevated on admission - 1.93     -Nephrology following - Probably prerenal azotemia secondary to NSAIDs and presence of dehydration and decreased oral intake     -continue IVF  5. Altered mental status     -possibly from UTI, Zosyn started     -Normal ammonia 24. Principal Problem:    GI bleed (3/11/2022)         IP CONSULT TO HOSPITALIST  IP CONSULT TO GASTROENTEROLOGY  IP CONSULT TO NEPHROLOGY      Patient discussed with Dr Shree Turcios and agrees to above impression and plan.   Thank you for allowing me to participate in this patients care    Eri Neumann FNP-C  Cc Referring Physician   Rigo Quarles MD

## 2022-03-11 NOTE — H&P
History and Physical    Patient: Maribeth Aquino MRN: [id number]  SSN: [id number]    YOB: 1960  Age: 63 y.o. Sex: female      Subjective:      Maribeth Aquino is a 63 y.o. female with PMH of COPD, anemia and depression. Patient is a poor historian with majority of the history obtained from  and ED physician. She presented to the ED with chief complaint of altered mental status and generalized weakness and chills. In addition she also complains of shortness of breath, and sore throat for the past few days. She otherwise denies productive cough, abdominal pain, or UTI symptoms. Upon further questioning, she reports having black tarry stool for the past week. She also had episodes of nausea and vomiting about 2 weeks ago, but has since resolved. Admits to regular NSAIDs use. In ED, vital signs within normal limits. Due to leukocytosis, WBC 19. Also has anemia, hemoglobin 8, baseline 10. Noted anion gap metabolic acidosis (chart review revealed previous diagnosis of RTA type I) and LOLITA on CKD. Lactic acid and ammonia negative. Urinalysis suggestive of UTI. Chest x-ray pending. Past Medical History:   Diagnosis Date    Anemia     Chronic obstructive pulmonary disease (HCC)     Chronic pain     neck, slipped disc    Ectopic pregnancy     GERD (gastroesophageal reflux disease)     Heart murmur     History of low potassium     Hypokalemia     Ill-defined condition     murmur    Psychiatric disorder     depression     Past Surgical History:   Procedure Laterality Date    HX APPENDECTOMY      HX OTHER SURGICAL      needle removal left arm      Family History   Family history unknown: Yes     Social History     Tobacco Use    Smoking status: Current Every Day Smoker     Packs/day: 0.25    Smokeless tobacco: Never Used   Substance Use Topics    Alcohol use: Never      Prior to Admission medications    Medication Sig Start Date End Date Taking?  Authorizing Provider sodium bicarbonate 650 mg tablet Take 2 Tablets by mouth daily. 2/10/22   Provider, Laxmi   calcium carbonate (TUMS) 200 mg calcium (500 mg) chew Take 2 Tablets by mouth every four (4) hours as needed for Other (reflux). 6/26/21   Nish DELGADO MD   ferrous sulfate 325 mg (65 mg iron) tablet Take 1 Tab by mouth Daily (before breakfast). 11/9/20   Lieutenant Kandice MD   omeprazole (PriLOSEC OTC) 20 mg tablet Take 1 Tab by mouth daily. 11/8/20   Lieutenant Kandice MD   PARoxetine (PAXIL) 40 mg tablet Take 1 Tab by mouth daily. 11/8/20   Lieutenant Kandice MD   pregabalin (LYRICA) 150 mg capsule Take 1 Cap by mouth two (2) times a day. Max Daily Amount: 300 mg. 11/8/20   Lieutenant Kandice MD   rOPINIRole (REQUIP) 0.25 mg tablet Take 1 Tab by mouth three (3) times daily. 11/8/20   Lieutenant Kandice MD        No Known Allergies    Review of Systems:   Constitutional: See HPI  Eyes: No visual disturbance  Ears, Nose, Mouth, Throat, and Face: No nasal congestion, No sore throat  Respiratory: No cough, No sputum, No wheezing,   Cardiovascular: No chest pain, No lower extremity edema, No Palpitations   Gastrointestinal: No nausea, No vomiting, No diarrhea, No constipation, No abdominal pain  Genitourinary: No frequency, No dysuria, No hematuria  Integument/Breast: No rash, No skin lesion(s), No dryness  Musculoskeletal: No arthralgias, No neck pain, No back pain  Neurological: No headaches, No dizziness, No confusion,  No seizures  Behavioral/Psychiatric: No anxiety, No depression      Objective:     Vitals:    03/10/22 2348 03/11/22 0045 03/11/22 0227   BP: 114/62 (!) 144/83 130/79   Pulse: 84 95 88   Resp: 20 16 19   Temp: 97.6 °F (36.4 °C)     SpO2: 99% 99% 99%   Weight: 81.6 kg (180 lb)     Height: 5' (1.524 m)          Physical Exam:   General: Confused, cooperative, no distress  Eye: Pale conjunctivae. PERRL, EOM's intact.    Throat and Neck: normal and no erythema or exudates noted. No mass   Lung: clear to auscultation bilaterally  Heart: regular rate and rhythm,   Abdomen: soft, non-tender. Bowel sounds normal. No masses,  Extremities:  able to move all extremities normal, atraumatic  Skin: Normal.  Neurologic: AOx1. Motor function and sensation grossly intact. Psychiatric: non focal    Recent Results (from the past 24 hour(s))   CBC WITH AUTOMATED DIFF    Collection Time: 03/11/22 12:00 AM   Result Value Ref Range    WBC 19.1 (H) 3.6 - 11.0 K/uL    RBC 3.09 (L) 3.80 - 5.20 M/uL    HGB 8.1 (L) 11.5 - 16.0 g/dL    HCT 25.9 (L) 35.0 - 47.0 %    MCV 83.8 80.0 - 99.0 FL    MCH 26.2 26.0 - 34.0 PG    MCHC 31.3 30.0 - 36.5 g/dL    RDW 22.6 (H) 11.5 - 14.5 %    PLATELET 393 (H) 769 - 400 K/uL    MPV 12.4 8.9 - 12.9 FL    NRBC 0.2 (H) 0.0  WBC    ABSOLUTE NRBC 0.03 (H) 0.00 - 0.01 K/uL    NEUTROPHILS 75 32 - 75 %    LYMPHOCYTES 18 12 - 49 %    MONOCYTES 3 (L) 5 - 13 %    EOSINOPHILS 1 0 - 7 %    BASOPHILS 0 0 - 1 %    MYELOCYTES 3 (H) 0 %    IMMATURE GRANULOCYTES 0 %    ABS. NEUTROPHILS 14.3 (H) 1.8 - 8.0 K/UL    ABS. LYMPHOCYTES 3.4 0.8 - 3.5 K/UL    ABS. MONOCYTES 0.6 0.0 - 1.0 K/UL    ABS. EOSINOPHILS 0.2 0.0 - 0.4 K/UL    ABS. BASOPHILS 0.0 0.0 - 0.1 K/UL    ABS. IMM.  GRANS. 0.0 K/UL    DF Manual      PLATELET COMMENTS Large Platelets      RBC COMMENTS Teardrop cells  1+       METABOLIC PANEL, COMPREHENSIVE    Collection Time: 03/11/22 12:00 AM   Result Value Ref Range    Sodium 142 136 - 145 mmol/L    Potassium 4.4 3.5 - 5.1 mmol/L    Chloride 114 (H) 97 - 108 mmol/L    CO2 12 (LL) 21 - 32 mmol/L    Anion gap 16 (H) 5 - 15 mmol/L    Glucose 86 65 - 100 mg/dL    BUN 44 (H) 6 - 20 mg/dL    Creatinine 1.93 (H) 0.55 - 1.02 mg/dL    BUN/Creatinine ratio 23 (H) 12 - 20      GFR est AA 32 (L) >60 ml/min/1.73m2    GFR est non-AA 27 (L) >60 ml/min/1.73m2    Calcium 8.8 8.5 - 10.1 mg/dL    Bilirubin, total 0.3 0.2 - 1.0 mg/dL    AST (SGOT) 19 15 - 37 U/L    ALT (SGPT) 27 12 - 78 U/L    Alk. phosphatase 172 (H) 45 - 117 U/L    Protein, total 7.2 6.4 - 8.2 g/dL    Albumin 2.7 (L) 3.5 - 5.0 g/dL    Globulin 4.5 (H) 2.0 - 4.0 g/dL    A-G Ratio 0.6 (L) 1.1 - 2.2     URINALYSIS W/MICROSCOPIC    Collection Time: 03/11/22  1:30 AM   Result Value Ref Range    Color Yellow/Straw      Appearance Clear Clear      Specific gravity 1.011 1.003 - 1.030      pH (UA) 6.0 5.0 - 8.0      Protein 30 (A) Negative mg/dL    Glucose Negative Negative mg/dL    Ketone Negative Negative mg/dL    Bilirubin Negative Negative      Blood Small (A) Negative      Urobilinogen 0.1 0.1 - 1.0 EU/dL    Nitrites Negative Negative      Leukocyte Esterase Small (A) Negative      WBC 10-20 0 - 4 /hpf    RBC 0-5 0 - 5 /hpf    Bacteria 1+ (A) Negative /hpf    Mucus Trace /lpf   OCCULT BLOOD, STOOL    Collection Time: 03/11/22  1:30 AM   Result Value Ref Range    Occult Blood,day 1 Positive (A) Negative     COVID-19 RAPID TEST    Collection Time: 03/11/22  1:51 AM   Result Value Ref Range    COVID-19 rapid test Not Detected Not Detected         XR Results (maximum last 3): Results from [place] encounter on 11/04/20    XR CHEST PORT    Narrative  Portable chest, 1012 hours. Comparison with 11/4/2020. Cardiopericardial  silhouette appears within normal limits. Retrocardiac opacity may containing  gas, favoring hiatus hernia with partially intrathoracic stomach. No evidence of  pulmonary edema, air space pneumonia, or pleural effusion. No evidence of  pneumothorax. Some structures are partially obscured by overlying monitoring  electrodes. Impression  IMPRESSION: Retrocardiac mass, likely hiatus hernia with partially intrathoracic  stomach. No acute pulmonary or pleural disease. XR CHEST PORT    Narrative  Chest single view. No prior comparison study in [facility] PACS. The lungs are clear. No interstitial or alveolar pulmonary edema. Cardiac  silhouette within normal limits.  Small rounded retrocardiac density is a  nonspecific finding, probable small hiatal hernia. No pneumothorax or pleural  effusion. CT Results (maximum last 3): No results found for this or any previous visit. MRI Results (maximum last 3): No results found for this or any previous visit. Nuclear Medicine Results (maximum last 3): No results found for this or any previous visit. US Results (maximum last 3): Results from [place] encounter on 11/04/20    US RETROPERITONEUM COMP    Narrative  Right kidney is 9.7 cm and left is 10.7. The kidneys are symmetric in size. There is no evidence of hydronephrosis, nephrolithiasis, scar, or mass. The  cortical echogenicity is normal. The aorta and IVC appear normal as visualized. There is no evidence of bladder mass. Impression  Impression: Normal exam      Assessment:   # GI bleed  # Acute anemia  # Altered mental status  # Leukocytosis   # UTI  # RTA type 1  # LOLITA on CKD    Plan:     # GI bleed  -IV PPI  -Consult GI    # Acute anemia  -Likely secondary to GI bleed  -No need for transfusion for now.  -H&H every 12 hours, transfuse as needed. # Altered mental status  - Likely secondary to acute anemia and possibly UTI   - Monitor for now. # UTI  - Bactrim    # RTA type 1  - Continue home bicarb     # LOLITA on CKD  - Likely secondary to regular NSAIDs use and acute anemia  - Monitor for now, consider consult nephrology if worsening.      Signed By: Carol Ann Mathias MD     March 11, 2022

## 2022-03-11 NOTE — PROGRESS NOTES
Patient Procalcitonin is 384.22, Radha Milton NO notified. Patient is complainig of throat pain [place] NP requested a placement of an order of throat losange.

## 2022-03-12 ENCOUNTER — ANESTHESIA (OUTPATIENT)
Dept: ENDOSCOPY | Age: 62
DRG: 871 | End: 2022-03-12
Payer: COMMERCIAL

## 2022-03-12 ENCOUNTER — ANESTHESIA EVENT (OUTPATIENT)
Dept: ENDOSCOPY | Age: 62
DRG: 871 | End: 2022-03-12
Payer: COMMERCIAL

## 2022-03-12 ENCOUNTER — APPOINTMENT (OUTPATIENT)
Dept: ENDOSCOPY | Age: 62
DRG: 871 | End: 2022-03-12
Attending: INTERNAL MEDICINE
Payer: COMMERCIAL

## 2022-03-12 LAB
ABO + RH BLD: NORMAL
ALBUMIN SERPL-MCNC: 2.6 G/DL (ref 3.5–5)
ANION GAP SERPL CALC-SCNC: 12 MMOL/L (ref 5–15)
ANTIGENS PRESENT RBC DONR: NORMAL
BASOPHILS # BLD: 0.1 K/UL (ref 0–0.1)
BASOPHILS NFR BLD: 1 % (ref 0–1)
BLD PROD TYP BPU: NORMAL
BLOOD GROUP ANTIBODIES SERPL: NORMAL
BLOOD GROUP ANTIBODIES SERPL: POSITIVE
BPU ID: NORMAL
BUN SERPL-MCNC: 22 MG/DL (ref 6–20)
BUN/CREAT SERPL: 17 (ref 12–20)
CA-I BLD-MCNC: 8.3 MG/DL (ref 8.5–10.1)
CHLORIDE SERPL-SCNC: 121 MMOL/L (ref 97–108)
CO2 SERPL-SCNC: 17 MMOL/L (ref 21–32)
CREAT SERPL-MCNC: 1.32 MG/DL (ref 0.55–1.02)
CROSSMATCH RESULT,%XM: NORMAL
CRP SERPL-MCNC: 12.3 MG/DL (ref 0–0.6)
DIFFERENTIAL METHOD BLD: ABNORMAL
EOSINOPHIL # BLD: 0 K/UL (ref 0–0.4)
EOSINOPHIL NFR BLD: 0 % (ref 0–7)
ERYTHROCYTE [DISTWIDTH] IN BLOOD BY AUTOMATED COUNT: 21.2 % (ref 11.5–14.5)
GLUCOSE BLD STRIP.AUTO-MCNC: 101 MG/DL (ref 65–117)
GLUCOSE SERPL-MCNC: 99 MG/DL (ref 65–100)
HCT VFR BLD AUTO: 25.7 % (ref 35–47)
HCT VFR BLD AUTO: 26 % (ref 35–47)
HGB BLD-MCNC: 8.2 G/DL (ref 11.5–16)
HGB BLD-MCNC: 8.5 G/DL (ref 11.5–16)
IMM GRANULOCYTES # BLD AUTO: 0 K/UL
IMM GRANULOCYTES NFR BLD AUTO: 0 %
LYMPHOCYTES # BLD: 2.4 K/UL (ref 0.8–3.5)
LYMPHOCYTES NFR BLD: 23 % (ref 12–49)
MCH RBC QN AUTO: 26.2 PG (ref 26–34)
MCHC RBC AUTO-ENTMCNC: 32.7 G/DL (ref 30–36.5)
MCV RBC AUTO: 80 FL (ref 80–99)
MONOCYTES # BLD: 0.1 K/UL (ref 0–1)
MONOCYTES NFR BLD: 1 % (ref 5–13)
MYELOCYTES NFR BLD MANUAL: 7 %
NEUTS SEG # BLD: 7.1 K/UL (ref 1.8–8)
NEUTS SEG NFR BLD: 68 % (ref 32–75)
NRBC # BLD: 0.04 K/UL (ref 0–0.01)
NRBC # BLD: 0.31 K/UL
NRBC BLD MANUAL-RTO: 3 PER 100 WBC
NRBC BLD-RTO: 0.4 PER 100 WBC
PERFORMED BY, TECHID: NORMAL
PHOSPHATE SERPL-MCNC: 3 MG/DL (ref 2.6–4.7)
PLATELET # BLD AUTO: 474 K/UL (ref 150–400)
PLATELET COMMENTS,PCOM: ABNORMAL
PMV BLD AUTO: 11.6 FL (ref 8.9–12.9)
POTASSIUM SERPL-SCNC: 3.4 MMOL/L (ref 3.5–5.1)
RBC # BLD AUTO: 3.25 M/UL (ref 3.8–5.2)
RBC MORPH BLD: ABNORMAL
SODIUM SERPL-SCNC: 150 MMOL/L (ref 136–145)
SPECIMEN EXP DATE BLD: NORMAL
STATUS OF UNIT,%ST: NORMAL
TRANSFUSION STATUS PATIENT QL: NORMAL
UNIT DIVISION, %UDIV: 0
WBC # BLD AUTO: 10.5 K/UL (ref 3.6–11)
XXAUTO CONTROL: NEGATIVE

## 2022-03-12 PROCEDURE — 74011000258 HC RX REV CODE- 258: Performed by: NURSE PRACTITIONER

## 2022-03-12 PROCEDURE — 87086 URINE CULTURE/COLONY COUNT: CPT

## 2022-03-12 PROCEDURE — 87186 SC STD MICRODIL/AGAR DIL: CPT

## 2022-03-12 PROCEDURE — 74011250636 HC RX REV CODE- 250/636: Performed by: INTERNAL MEDICINE

## 2022-03-12 PROCEDURE — 2709999900 HC NON-CHARGEABLE SUPPLY: Performed by: INTERNAL MEDICINE

## 2022-03-12 PROCEDURE — 86140 C-REACTIVE PROTEIN: CPT

## 2022-03-12 PROCEDURE — 0DJ08ZZ INSPECTION OF UPPER INTESTINAL TRACT, VIA NATURAL OR ARTIFICIAL OPENING ENDOSCOPIC: ICD-10-PCS | Performed by: INTERNAL MEDICINE

## 2022-03-12 PROCEDURE — 85025 COMPLETE CBC W/AUTO DIFF WBC: CPT

## 2022-03-12 PROCEDURE — 92610 EVALUATE SWALLOWING FUNCTION: CPT

## 2022-03-12 PROCEDURE — 85018 HEMOGLOBIN: CPT

## 2022-03-12 PROCEDURE — 74011250637 HC RX REV CODE- 250/637: Performed by: INTERNAL MEDICINE

## 2022-03-12 PROCEDURE — 36415 COLL VENOUS BLD VENIPUNCTURE: CPT

## 2022-03-12 PROCEDURE — 76040000019: Performed by: INTERNAL MEDICINE

## 2022-03-12 PROCEDURE — 87077 CULTURE AEROBIC IDENTIFY: CPT

## 2022-03-12 PROCEDURE — 74011000250 HC RX REV CODE- 250: Performed by: NURSE ANESTHETIST, CERTIFIED REGISTERED

## 2022-03-12 PROCEDURE — 74011250636 HC RX REV CODE- 250/636: Performed by: NURSE PRACTITIONER

## 2022-03-12 PROCEDURE — 36430 TRANSFUSION BLD/BLD COMPNT: CPT

## 2022-03-12 PROCEDURE — 80069 RENAL FUNCTION PANEL: CPT

## 2022-03-12 PROCEDURE — C9113 INJ PANTOPRAZOLE SODIUM, VIA: HCPCS | Performed by: INTERNAL MEDICINE

## 2022-03-12 PROCEDURE — 74011000250 HC RX REV CODE- 250: Performed by: INTERNAL MEDICINE

## 2022-03-12 PROCEDURE — 74011250636 HC RX REV CODE- 250/636: Performed by: NURSE ANESTHETIST, CERTIFIED REGISTERED

## 2022-03-12 PROCEDURE — 82962 GLUCOSE BLOOD TEST: CPT

## 2022-03-12 PROCEDURE — 65270000029 HC RM PRIVATE

## 2022-03-12 PROCEDURE — 76060000031 HC ANESTHESIA FIRST 0.5 HR: Performed by: INTERNAL MEDICINE

## 2022-03-12 RX ORDER — GLYCOPYRROLATE 0.2 MG/ML
INJECTION INTRAMUSCULAR; INTRAVENOUS AS NEEDED
Status: DISCONTINUED | OUTPATIENT
Start: 2022-03-12 | End: 2022-03-12 | Stop reason: HOSPADM

## 2022-03-12 RX ORDER — SODIUM CHLORIDE, SODIUM LACTATE, POTASSIUM CHLORIDE, CALCIUM CHLORIDE 600; 310; 30; 20 MG/100ML; MG/100ML; MG/100ML; MG/100ML
INJECTION, SOLUTION INTRAVENOUS
Status: DISCONTINUED | OUTPATIENT
Start: 2022-03-12 | End: 2022-03-12 | Stop reason: HOSPADM

## 2022-03-12 RX ORDER — PROPOFOL 10 MG/ML
INJECTION, EMULSION INTRAVENOUS AS NEEDED
Status: DISCONTINUED | OUTPATIENT
Start: 2022-03-12 | End: 2022-03-12 | Stop reason: HOSPADM

## 2022-03-12 RX ORDER — LIDOCAINE HYDROCHLORIDE 20 MG/ML
INJECTION, SOLUTION EPIDURAL; INFILTRATION; INTRACAUDAL; PERINEURAL AS NEEDED
Status: DISCONTINUED | OUTPATIENT
Start: 2022-03-12 | End: 2022-03-12 | Stop reason: HOSPADM

## 2022-03-12 RX ORDER — POTASSIUM CITRATE 5 MEQ/1
10 TABLET, EXTENDED RELEASE ORAL 4 TIMES DAILY
Status: DISCONTINUED | OUTPATIENT
Start: 2022-03-12 | End: 2022-03-14

## 2022-03-12 RX ADMIN — SODIUM CHLORIDE, PRESERVATIVE FREE 40 MG: 5 INJECTION INTRAVENOUS at 09:11

## 2022-03-12 RX ADMIN — LIDOCAINE HYDROCHLORIDE 60 MG: 20 INJECTION, SOLUTION EPIDURAL; INFILTRATION; INTRACAUDAL; PERINEURAL at 08:25

## 2022-03-12 RX ADMIN — ROPINIROLE HYDROCHLORIDE 0.25 MG: 0.25 TABLET, FILM COATED ORAL at 17:08

## 2022-03-12 RX ADMIN — SODIUM BICARBONATE: 84 INJECTION, SOLUTION INTRAVENOUS at 19:48

## 2022-03-12 RX ADMIN — PROPOFOL 50 MG: 10 INJECTION, EMULSION INTRAVENOUS at 08:29

## 2022-03-12 RX ADMIN — POTASSIUM CITRATE 10 MEQ: 5 TABLET ORAL at 09:10

## 2022-03-12 RX ADMIN — SODIUM CHLORIDE, PRESERVATIVE FREE 10 ML: 5 INJECTION INTRAVENOUS at 15:10

## 2022-03-12 RX ADMIN — POTASSIUM CITRATE 10 MEQ: 5 TABLET ORAL at 21:45

## 2022-03-12 RX ADMIN — SODIUM CHLORIDE, PRESERVATIVE FREE 10 ML: 5 INJECTION INTRAVENOUS at 05:16

## 2022-03-12 RX ADMIN — SODIUM CHLORIDE, PRESERVATIVE FREE 40 MG: 5 INJECTION INTRAVENOUS at 20:31

## 2022-03-12 RX ADMIN — PREGABALIN 150 MG: 150 CAPSULE ORAL at 09:11

## 2022-03-12 RX ADMIN — SODIUM CHLORIDE, POTASSIUM CHLORIDE, SODIUM LACTATE AND CALCIUM CHLORIDE: 600; 310; 30; 20 INJECTION, SOLUTION INTRAVENOUS at 08:14

## 2022-03-12 RX ADMIN — POTASSIUM CITRATE 10 MEQ: 5 TABLET ORAL at 13:31

## 2022-03-12 RX ADMIN — PIPERACILLIN AND TAZOBACTAM 3.38 G: 3; .375 INJECTION, POWDER, LYOPHILIZED, FOR SOLUTION INTRAVENOUS at 12:07

## 2022-03-12 RX ADMIN — POTASSIUM CITRATE 10 MEQ: 5 TABLET ORAL at 17:08

## 2022-03-12 RX ADMIN — SODIUM CHLORIDE, PRESERVATIVE FREE 10 ML: 5 INJECTION INTRAVENOUS at 22:18

## 2022-03-12 RX ADMIN — PAROXETINE HYDROCHLORIDE 40 MG: 20 TABLET, FILM COATED ORAL at 09:10

## 2022-03-12 RX ADMIN — ROPINIROLE HYDROCHLORIDE 0.25 MG: 0.25 TABLET, FILM COATED ORAL at 09:11

## 2022-03-12 RX ADMIN — PROPOFOL 50 MG: 10 INJECTION, EMULSION INTRAVENOUS at 08:25

## 2022-03-12 RX ADMIN — SODIUM BICARBONATE: 84 INJECTION, SOLUTION INTRAVENOUS at 05:21

## 2022-03-12 RX ADMIN — PREGABALIN 150 MG: 150 CAPSULE ORAL at 20:31

## 2022-03-12 RX ADMIN — GLYCOPYRROLATE 0.2 MG: 0.2 INJECTION, SOLUTION INTRAMUSCULAR; INTRAVENOUS at 08:25

## 2022-03-12 RX ADMIN — ROPINIROLE HYDROCHLORIDE 0.25 MG: 0.25 TABLET, FILM COATED ORAL at 21:45

## 2022-03-12 RX ADMIN — PIPERACILLIN AND TAZOBACTAM 3.38 G: 3; .375 INJECTION, POWDER, LYOPHILIZED, FOR SOLUTION INTRAVENOUS at 04:19

## 2022-03-12 RX ADMIN — PIPERACILLIN AND TAZOBACTAM 3.38 G: 3; .375 INJECTION, POWDER, LYOPHILIZED, FOR SOLUTION INTRAVENOUS at 20:31

## 2022-03-12 NOTE — PROGRESS NOTES
Progress Note    Patient: Roger Avilez MRN: [id number]  SSN: [id number]    YOB: 1960  Age: 63 y.o. Sex: female      Admit Date: 3/10/2022    LOS: 1 day     Subjective:   GI in consultation for GI bleed-dark stools    3/12: Patient seen resting comfortably, she is sleepy with some confusion. She did have EGD today showing  esophagitis   Unspecified chronic gastritis without bleeding   No active bleeding, No active  bleeding in upper GI tract. Monitor Hemoglobin,Transfuse as necessary Schedule for colonoscopy. HgB is 8.2     EGD 3/12: showing  esophagitis   Unspecified chronic gastritis without bleeding   No active bleeding in upper GI tract    History of Present Illness: Roger Avilez is a 63 y.o. female who is seen in consultation for GI bleed - dark stools. Patient was admitted to the hospital with complain of altered mental status and generalized weakness. Patient is a poor historian. History gathered from chart documentation. Patient reportedly fell out of bed prior to arrival. Family reported patient has been having intermittent altered mental status over the past few days. She had nausea, vomiting, and diarrhea last week. Reported having dark liquid stools for the past week. She denies use of blood thinners but admits to regular NSAIDs use. She had several admissions with diagnosis of renal tubular acidosis most likely from chronic NSAID use.  Denies acid reflux or heartburn.          Objective:     Vitals:    03/12/22 0832 03/12/22 0836 03/12/22 0919 03/12/22 1507   BP: 126/66 102/64 127/72 (!) 145/70   Pulse: (!) 122 (!) 112 (!) 111 91   Resp: 22 20 18 16   Temp: 96.8 °F (36 °C) 98.4 °F (36.9 °C) 98.7 °F (37.1 °C) 98.6 °F (37 °C)   SpO2: 98% 98% 95% 96%   Weight:       Height:            Intake and Output:  Current Shift: 03/12 0701 - 03/12 1900  In: -   Out: 225 [Urine:225]  Last three shifts: 03/10 1901 - 03/12 0700  In: 2119.9 [I.V.:1698.6]  Out: 2820 [Urine:2820]    Physical Exam:   Skin:  Extremities and face reveal no rashes. No cutler erythema. HEENT: Sclerae anicteric. Extra-occular muscles are intact. No abnormal pigmentation of the lips. The neck is supple. Cardiovascular: Regular rate and rhythm. Respiratory:  Comfortable breathing with no accessory muscle use. GI:  Abdomen nondistended, soft, and nontender. No enlargement of the liver or spleen. No masses palpable. Rectal:  Deferred  Musculoskeletal: Generilized weakness  Neurological:  Gross memory appears intact. Confused at times  Psychiatric:  Mood appears appropriate with judgement intact. Lymphatic:  No visible adenopathy      Lab/Data Review:  Recent Results (from the past 24 hour(s))   CBC WITH AUTOMATED DIFF    Collection Time: 03/12/22  6:13 AM   Result Value Ref Range    WBC 10.5 3.6 - 11.0 K/uL    RBC 3.25 (L) 3.80 - 5.20 M/uL    HGB 8.5 (L) 11.5 - 16.0 g/dL    HCT 26.0 (L) 35.0 - 47.0 %    MCV 80.0 80.0 - 99.0 FL    MCH 26.2 26.0 - 34.0 PG    MCHC 32.7 30.0 - 36.5 g/dL    RDW 21.2 (H) 11.5 - 14.5 %    PLATELET 709 (H) 660 - 400 K/uL    MPV 11.6 8.9 - 12.9 FL    NRBC 0.4 (H) 0.0  WBC    ABSOLUTE NRBC 0.04 (H) 0.00 - 0.01 K/uL    NEUTROPHILS 68 32 - 75 %    LYMPHOCYTES 23 12 - 49 %    MONOCYTES 1 (L) 5 - 13 %    EOSINOPHILS 0 0 - 7 %    BASOPHILS 1 0 - 1 %    MYELOCYTES 7 (H) 0 %    NRBC 3.0  WBC    IMMATURE GRANULOCYTES 0 %    ABS. NEUTROPHILS 7.1 1.8 - 8.0 K/UL    ABS. LYMPHOCYTES 2.4 0.8 - 3.5 K/UL    ABS. MONOCYTES 0.1 0.0 - 1.0 K/UL    ABS. EOSINOPHILS 0.0 0.0 - 0.4 K/UL    ABS. BASOPHILS 0.1 0.0 - 0.1 K/UL    ABSOLUTE NRBC 0.31 K/uL    ABS. IMM.  GRANS. 0.0 K/UL    DF Manual      PLATELET COMMENTS Large Platelets      RBC COMMENTS Polychromasia  1+        RBC COMMENTS Target Cells  1+        RBC COMMENTS Stomatocytes  1+        RBC COMMENTS Anisocytosis  1+       RENAL FUNCTION PANEL    Collection Time: 03/12/22  6:13 AM   Result Value Ref Range    Sodium 150 (H) 136 - 145 mmol/L Potassium 3.4 (L) 3.5 - 5.1 mmol/L    Chloride 121 (H) 97 - 108 mmol/L    CO2 17 (L) 21 - 32 mmol/L    Anion gap 12 5 - 15 mmol/L    Glucose 99 65 - 100 mg/dL    BUN 22 (H) 6 - 20 mg/dL    Creatinine 1.32 (H) 0.55 - 1.02 mg/dL    BUN/Creatinine ratio 17 12 - 20      GFR est AA 50 (L) >60 ml/min/1.73m2    GFR est non-AA 41 (L) >60 ml/min/1.73m2    Calcium 8.3 (L) 8.5 - 10.1 mg/dL    Phosphorus 3.0 2.6 - 4.7 mg/dL    Albumin 2.6 (L) 3.5 - 5.0 g/dL   C REACTIVE PROTEIN, QT    Collection Time: 03/12/22  9:30 AM   Result Value Ref Range    C-Reactive protein 12.30 (H) 0.00 - 0.60 mg/dL   HGB & HCT    Collection Time: 03/12/22 10:38 AM   Result Value Ref Range    HGB 8.2 (L) 11.5 - 16.0 g/dL    HCT 25.7 (L) 35.0 - 47.0 %              XR CHEST PORT   Final Result      No consolidation in the lungs. Hiatal hernia. Follow-up as clinically indicated. Assessment:     Principal Problem:    GI bleed (3/11/2022)        Plan:     . GI bleed - dark stool    -Stool occult positive    -Hgb 8.2. Monitor hgb and transfuse as needed.    -continue PPI    -repeat CBC in am.    -S/P EGD 3/12/22      -Colonoscopy Monday 3/14/22       -NPO after midnight 3/14/22        -Start Prep 3/13/22 at 1700  2. Leukocytosis (Improved)     -WBC 10.5     -Urine showing pyuria     -Currently on Zosyn  3. Acute anemia     -most likely d/t GI bleed     -Hgb . Monitor and transfuse to keep >7.0     -For EGD in am.  4. LOLITA on CKD     -creatinine elevated on admission - 1.93     -Nephrology following - Probably prerenal azotemia secondary to NSAIDs and presence of dehydration and decreased oral intake     -continue IVF  5. Altered mental status     -possibly from UTI, Zosyn started     -Normal ammonia 24.                                    Revision History                                              Thank you for allowing me to participate in this patients care  Plan discussed with Dr. Payton Farias and he approves.     Signed By: Mindi Land NP March 12, 2022

## 2022-03-12 NOTE — PROGRESS NOTES
SPEECH LANGUAGE PATHOLOGY BEDSIDE SWALLOW EVALUATION  Patient: Shamir Zaman (48 y.o. female)  Date: 3/12/2022  Primary Diagnosis: GI bleed [K92.2]  Procedure(s) (LRB):  ESOPHAGOGASTRODUODENOSCOPY (EGD) (N/A) Day of Surgery   Precautions: Aspiration      ASSESSMENT :  Based on the objective data described below, the patient presents with largely [facility] swallow function. Patient with timely and efficient swallow. Pharyngeal phase appears largely [facility]. HLE adequate to palpation. Patient tolerated single and consecutive straw sips thin liquid with no overt s/s of pen/aspiration. Nsg reports patient is tolerating current diet of clear thin liquids with no overt s/s of pen/aspiration. Patient will benefit from skilled intervention to address the above impairments. Patients rehabilitation potential is considered to be Good     PLAN :  Recommendations and Planned Interventions:  Recommend continue clear thin liquid diet. Adhere to aspiration precautions. Rec PO trials with clinician once cleared by MD  Frequency/Duration: Patient will be followed by speech-language pathology 5 times a week to address goals. Discharge Recommendations: To Be Determined     SUBJECTIVE:   Patient is agreeable to beginning eval. Patient drowsy throughout eval    OBJECTIVE:     CXR Results  (Last 48 hours)                 03/11/22 0227  XR CHEST PORT Final result    Impression:      No consolidation in the lungs. Hiatal hernia. Follow-up as clinically indicated. Narrative:  Chest one view       INDICATION: Leukocytosis       COMPARISON: 11/8/2020       FINDINGS:       Cardiac silhouette is upper normal. Hiatal hernia. Mildly elevated right   hemidiaphragm again seen. No feliciano pulmonary edema. No consolidation in the   lungs. There may be minimal atelectasis in the lung bases. No pleural effusions. No pneumothorax. No displaced fracture in the visualized bony structures.                   Past Medical History:   Diagnosis Date    Anemia     Chronic obstructive pulmonary disease (HCC)     Chronic pain     neck, slipped disc    Ectopic pregnancy     GERD (gastroesophageal reflux disease)     Heart murmur     History of low potassium     Hypokalemia     Ill-defined condition     murmur    Psychiatric disorder     depression     Past Surgical History:   Procedure Laterality Date    HX APPENDECTOMY      HX OTHER SURGICAL      needle removal left arm     Prior Level of Function/Home Situation:   Home Situation  Home Environment: Private residence  One/Two Story Residence: One story  Living Alone: No  Support Systems: Spouse/Significant Other  Patient Expects to be Discharged to[de-identified] Home  Current DME Used/Available at Home: None  Diet prior to admission: Regular, thin  Current Diet: Clear liquids    Cognitive and Communication Status:  Neurologic State: Drowsy  Orientation Level: Oriented to person,Disoriented to time,Disoriented to situation,Disoriented to place  Cognition: Decreased command following,Decreased attention/concentration    Swallowing Evaluation:   Oral Assessment:  Oral Assessment  Labial: No impairment  Dentition: Full;Natural  Oral Hygiene:  (Adequate)  Lingual: Decreased rate;Decreased strength    P.O. Trials:  Patient Position:  (HOB raised)  Vocal quality prior to P.O.: No impairment  Consistency Presented: Thin liquid  How Presented: SLP-fed/presented; Successive swallows;Straw  How Much:  (Multiple presentations)  Bolus Acceptance: No impairment  Bolus Formation/Control: No impairment  Propulsion: No impairment  Oral Residue: None  Initiation of Swallow: No impairment  Laryngeal Elevation: Functional  Aspiration Signs/Symptoms: None  Pharyngeal Phase Characteristics: No impairment, issues, or problems   Oral Phase Severity: No impairment  Pharyngeal Phase Severity : No impairment    Voice:  Vocal Quality: No impairment    Pain: 0    After treatment:   Patient left in no apparent distress in bed, Call bell within reach, and Nursing notified    COMMUNICATION/EDUCATION:   Patient was educated regarding purpose of SLP assessment, POC, diet recs and sw safety precautions. Patient demonstrated Guarded understanding as evidenced by drowsiness. The patient's plan of care including recommendations, planned interventions, and recommended diet changes were discussed with: Registered nurse and Physician. Patient/family agree to work toward stated goals and plan of care. Problem: Dysphagia (Adult)  Goal: *Acute Goals and Plan of Care (Insert Text)  Description: Speech Therapy Goals  Initiated 3/12/2022  -Patient will tolerate thin liquid diet without signs/symptoms of aspiration within 7 day(s). [ ] Not met  [ ]  MET   [ ] Progressing  [ ] Leopold Puff  -Patient will tolerate PO trials with clinician without signs/symptoms of aspiration within 7 day(s). [ ] Not met  [ ]  MET   [ ] Progressing  [ ] Leopold Puff  -Patient will demonstrate understanding of swallow safety precautions and aspiration precautions, diet recs within 7 day(s).         [ ] Not met  [ ]  MET   [ ] Progressing  [ ] Discontinue   Outcome: Not Met    Thank you for this referral.  Armando Arnold M.S. CCC-SLP  Time Calculation: 12 mins

## 2022-03-12 NOTE — ANESTHESIA PREPROCEDURE EVALUATION
Relevant Problems   RENAL FAILURE   (+) Acute kidney injury ([facility] [address]       Anesthetic History   No history of anesthetic complications            Review of Systems / Medical History  Patient summary reviewed, nursing notes reviewed and pertinent labs reviewed    Pulmonary    COPD      Smoker         Neuro/Psych         Psychiatric history    Comments: ALTERED MENTAL STATUS  Cardiovascular  Within defined limits                     GI/Hepatic/Renal     GERD          Comments: GI BLEED Endo/Other        Anemia (Hb/Hct 8.5/26.0)     Other Findings   Comments: GENERALIZED WEAKNESS. Na+ 150    Hx OF FALL.           Physical Exam    Airway  Mallampati: IV  TM Distance: < 4 cm    Mouth opening: Normal     Cardiovascular    Rhythm: regular  Rate: normal    Murmur     Dental         Pulmonary      Decreased breath sounds           Abdominal  Abdominal exam normal       Other Findings            Anesthetic Plan    ASA: 2, emergent  Anesthesia type: MAC          Induction: Intravenous  Anesthetic plan and risks discussed with: Patient

## 2022-03-12 NOTE — PROGRESS NOTES
Hospitalist Progress Note         MARGARET Garcia, FNP-C    Daily Progress Note: 3/12/2022    Manuel Cr is a 63 y.o. female with PMH of COPD, anemia and depression. Patient is a poor historian with majority of the history obtained from  and ED physician. She presented to the ED with chief complaint of altered mental status and generalized weakness and chills. In addition she also complains of shortness of breath, and sore throat for the past few days. She otherwise denies productive cough, abdominal pain, or UTI symptoms. Upon further questioning, she reports having black tarry stool for the past week. She also had episodes of nausea and vomiting about 2 weeks ago, but has since resolved. Admits to regular NSAIDs use.      In ED, vital signs within normal limits. Due to leukocytosis, WBC 19. Also has anemia, hemoglobin 8, baseline 10. Noted anion gap metabolic acidosis (chart review revealed previous diagnosis of RTA type I) and LOLITA on CKD. Lactic acid and ammonia negative. Urinalysis suggestive of UTI. Chest x-ray negative for acute findings, hiatal hernia noted. Metabolic acidosis noted on ABGs. Start sodium bicarb gtt. Nephrology following, sodium bicarb 150 meq. Subjective:   Subjective   Patient examined alert with some confusion lying in bed  No acute distress noted on examination  No overnight events reported    Review of Systems:   Review of Systems   Constitutional: Negative for fever. Respiratory: Negative for cough and shortness of breath. Cardiovascular: Negative for chest pain. Genitourinary: Negative for dysuria. Musculoskeletal: Negative for myalgias.          Objective:   Objective      Vitals:  Patient Vitals for the past 12 hrs:   Temp Pulse Resp BP SpO2   03/12/22 0919 98.7 °F (37.1 °C) (!) 111 18 127/72 95 %   03/12/22 0836 98.4 °F (36.9 °C) (!) 112 20 102/64 98 %   03/12/22 0832 96.8 °F (36 °C) (!) 122 22 126/66 98 %   03/12/22 0830  (!) 121 20 Iline Marker ) 146/67 98 %   03/12/22 0813  99 18 133/64    03/12/22 0732 97.9 °F (36.6 °C) (!) 102 16 (!) 152/76 95 %   03/12/22 0454 97.3 °F (36.3 °C) 90 16 (!) 141/84 97 %   03/12/22 0213 98.5 °F (36.9 °C) (!) 101 18 (!) 142/86 96 %   03/12/22 0158 98.4 °F (36.9 °C) 99 18 (!) 152/81 96 %   03/12/22 0004 97.3 °F (36.3 °C) 93 18 (!) 143/88 95 %   03/11/22 2141 98.2 °F (36.8 °C) 99 18 (!) 142/69 98 %        Physical Exam:  Physical Exam  Vitals and nursing note reviewed. Constitutional:       Appearance: Normal appearance. Cardiovascular:      Rate and Rhythm: Tachycardia present. Heart sounds: Normal heart sounds. Pulmonary:      Effort: Pulmonary effort is normal.      Breath sounds: Normal breath sounds. Abdominal:      General: Bowel sounds are normal.      Palpations: Abdomen is soft. Musculoskeletal:         General: Normal range of motion. Skin:     General: Skin is warm and dry. Capillary Refill: Capillary refill takes less than 2 seconds. Neurological:      Mental Status: She is alert. She is disoriented.    Psychiatric:         Mood and Affect: Mood normal.          Lab Results:  Recent Results (from the past 24 hour(s))   HGB & HCT    Collection Time: 03/11/22  1:35 PM   Result Value Ref Range    HGB 7.1 (L) 11.5 - 16.0 g/dL    HCT 22.7 (L) 35.0 - 47.0 %   CBC WITH AUTOMATED DIFF    Collection Time: 03/12/22  6:13 AM   Result Value Ref Range    WBC 10.5 3.6 - 11.0 K/uL    RBC 3.25 (L) 3.80 - 5.20 M/uL    HGB 8.5 (L) 11.5 - 16.0 g/dL    HCT 26.0 (L) 35.0 - 47.0 %    MCV 80.0 80.0 - 99.0 FL    MCH 26.2 26.0 - 34.0 PG    MCHC 32.7 30.0 - 36.5 g/dL    RDW 21.2 (H) 11.5 - 14.5 %    PLATELET 933 (H) 371 - 400 K/uL    MPV 11.6 8.9 - 12.9 FL    NRBC 0.4 (H) 0.0  WBC    ABSOLUTE NRBC 0.04 (H) 0.00 - 0.01 K/uL    NEUTROPHILS 68 32 - 75 %    LYMPHOCYTES 23 12 - 49 %    MONOCYTES 1 (L) 5 - 13 %    EOSINOPHILS 0 0 - 7 %    BASOPHILS 1 0 - 1 %    MYELOCYTES 7 (H) 0 %    NRBC 3.0  WBC IMMATURE GRANULOCYTES 0 %    ABS. NEUTROPHILS 7.1 1.8 - 8.0 K/UL    ABS. LYMPHOCYTES 2.4 0.8 - 3.5 K/UL    ABS. MONOCYTES 0.1 0.0 - 1.0 K/UL    ABS. EOSINOPHILS 0.0 0.0 - 0.4 K/UL    ABS. BASOPHILS 0.1 0.0 - 0.1 K/UL    ABSOLUTE NRBC 0.31 K/uL    ABS. IMM.  GRANS. 0.0 K/UL    DF Manual      PLATELET COMMENTS Large Platelets      RBC COMMENTS Polychromasia  1+        RBC COMMENTS Target Cells  1+        RBC COMMENTS Stomatocytes  1+        RBC COMMENTS Anisocytosis  1+       RENAL FUNCTION PANEL    Collection Time: 03/12/22  6:13 AM   Result Value Ref Range    Sodium 150 (H) 136 - 145 mmol/L    Potassium 3.4 (L) 3.5 - 5.1 mmol/L    Chloride 121 (H) 97 - 108 mmol/L    CO2 17 (L) 21 - 32 mmol/L    Anion gap 12 5 - 15 mmol/L    Glucose 99 65 - 100 mg/dL    BUN 22 (H) 6 - 20 mg/dL    Creatinine 1.32 (H) 0.55 - 1.02 mg/dL    BUN/Creatinine ratio 17 12 - 20      GFR est AA 50 (L) >60 ml/min/1.73m2    GFR est non-AA 41 (L) >60 ml/min/1.73m2    Calcium 8.3 (L) 8.5 - 10.1 mg/dL    Phosphorus 3.0 2.6 - 4.7 mg/dL    Albumin 2.6 (L) 3.5 - 5.0 g/dL          Diagnostic Images:  CT Results  (Last 48 hours)    None          Current Medications:    Current Facility-Administered Medications:     calcium carbonate (TUMS) chewable tablet 400 mg [elemental], 400 mg, Oral, Q4H PRN, Payton Schilling MD    PARoxetine (PAXIL) tablet 40 mg, 40 mg, Oral, DAILY, Payton Schilling MD, 40 mg at 03/12/22 0910    pregabalin (LYRICA) capsule 150 mg, 150 mg, Oral, BID, Juliano Schilling MD, 150 mg at 03/12/22 0911    rOPINIRole (REQUIP) tablet 0.25 mg, 0.25 mg, Oral, TID, Joselito Mckenzie MD, 0.25 mg at 03/12/22 0911    [Held by provider] sodium bicarbonate tablet 1,300 mg, 1,300 mg, Oral, DAILY, Payton Schilling MD    sodium chloride (NS) flush 5-40 mL, 5-40 mL, IntraVENous, Q8H, Payton Schilling MD, 10 mL at 03/12/22 0516    sodium chloride (NS) flush 5-40 mL, 5-40 mL, IntraVENous, PRN, Payton Schilling MD    acetaminophen (TYLENOL) tablet 650 mg, 650 mg, Oral, Q6H PRN **OR** acetaminophen (TYLENOL) suppository 650 mg, 650 mg, Rectal, Q6H PRN, Radha Schilling MD    polyethylene glycol (MIRALAX) packet 17 g, 17 g, Oral, DAILY PRN, Radha Schilling MD    ondansetron (ZOFRAN ODT) tablet 4 mg, 4 mg, Oral, Q8H PRN **OR** ondansetron (ZOFRAN) injection 4 mg, 4 mg, IntraVENous, Q6H PRN, Radha Schilling MD    pantoprazole (PROTONIX) 40 mg in 0.9% sodium chloride 10 mL injection, 40 mg, IntraVENous, Q12H, Juliano Schilling MD, 40 mg at 03/12/22 0911    piperacillin-tazobactam (ZOSYN) 3.375 g in 0.9% sodium chloride (MBP/ADV) 100 mL MBP, 3.375 g, IntraVENous, Q8H, Autumn Vidal NP, Last Rate: 25 mL/hr at 03/12/22 0419, 3.375 g at 03/12/22 0419    benzocaine-menthoL (CEPACOL) lozenge 1 Lozenge, 1 Lozenge, Mucous Membrane, PRN, Nito Corbin NP    potassium citrate (UROCIT-K5) tablet 10 mEq, 10 mEq, Oral, TID, Kiran Danielle MD, 10 mEq at 03/12/22 0910    sodium bicarbonate (8.4%) 150 mEq in dextrose 5% 1,000 mL infusion, , IntraVENous, CONTINUOUS, Kiran Danielle MD, Last Rate: 84 mL/hr at 03/12/22 0521, New Bag at 03/12/22 0521    0.9% sodium chloride infusion 250 mL, 250 mL, IntraVENous, PRN, Nito Corbin NP       ASSESSMENT:    1. GI bleed  -Patient present with above symptomology  -Continue IV PPI  -GI following  -EGD scheduled this morning     2. Acute anemia  -Likely secondary to GI bleed  -No need for transfusion for now.  -H&H every 12 hours, transfuse as needed  -current hgb 8.5 s/p 1u prbc, baseline around 10.7     3. Altered mental status  - Likely secondary to acute anemia and metabolic acidosis  - Monitor for now     4. Sepsis  - leukocytosis, tachycardia, elevated procal  - follow urine and blood cultures  - continue IV zosyn  - wbc downtrending  - procal and crp pending     5. RTA type 1   6. LOLITA on CKD  7. Metabolic acidosis  - Continue sodium bicarb gtt  - Nephrology managing    8.  Suspected UTI  - UA not too impressive, Bacteria +1  - transitioned to IV zosyn    9. Hypokalemia  - replenish electrolytes      Full Code  Dvt Prophylaxis scd's  GI Prophylaxis protonix  Disposition:  -GI and nephrology following  -EGD this morning  -clinical improvement  -continue PPI, trend h/h  ~48hrs      NOK: Anayeli Champ [phone] (vm left)    Above treatment plan reviewed and discussed with patient in detail at bedside, all questions answered. Care Plan discussed with: Interdisciplinary team    Total time spent with patient: 35 minutes.     Libra Ross NP

## 2022-03-12 NOTE — PROGRESS NOTES
Called Mr. Mendoza to obtain consent for blood transfusion. Risk and benefit explained, consent given.     Chiquita Miller MD  11:44 PM  03/11/22

## 2022-03-12 NOTE — PROGRESS NOTES
Renal Note    NAME:  Roger Avilez   :   1960   MRN:   [id number]     ATTENDING: Nikko Hamilton MD  PCP:  None    Date/Time:  3/12/2022 12:36 PM      Subjective:   REQUESTING PHYSICIAN:  REASON FOR CONSULT:    Metabolic acidosis    Patient seen in the room. She is still very confused. Is on clear liquids. Bicarb has improved today to 17 but sodium increased to 150. Potassium 3.4. Renal function is improving creatinine 1.3 today    She is still lethargic and confused    Past Medical History:   Diagnosis Date    Anemia     Chronic obstructive pulmonary disease (HCC)     Chronic pain     neck, slipped disc    Ectopic pregnancy     GERD (gastroesophageal reflux disease)     Heart murmur     History of low potassium     Hypokalemia     Ill-defined condition     murmur    Psychiatric disorder     depression      Past Surgical History:   Procedure Laterality Date    HX APPENDECTOMY      HX OTHER SURGICAL      needle removal left arm     Social History     Tobacco Use    Smoking status: Current Every Day Smoker     Packs/day: 0.25    Smokeless tobacco: Never Used   Substance Use Topics    Alcohol use: Never      Family History   Family history unknown: Yes       No Known Allergies   Prior to Admission medications    Medication Sig Start Date End Date Taking? Authorizing Provider   sodium bicarbonate 650 mg tablet Take 2 Tablets by mouth daily. 2/10/22   Provider, Historical   calcium carbonate (TUMS) 200 mg calcium (500 mg) chew Take 2 Tablets by mouth every four (4) hours as needed for Other (reflux). 21   Scooter DELGADO MD   ferrous sulfate 325 mg (65 mg iron) tablet Take 1 Tab by mouth Daily (before breakfast). 20   Aruna Chandra MD   omeprazole (PriLOSEC OTC) 20 mg tablet Take 1 Tab by mouth daily. 20   Aruna Chandra MD   PARoxetine (PAXIL) 40 mg tablet Take 1 Tab by mouth daily.  20   Aruna Chandra MD   pregabalin (LYRICA) 150 mg capsule Take 1 Cap by mouth two (2) times a day. Max Daily Amount: 300 mg. 20   Bonnie Mendiola MD   rOPINIRole (REQUIP) 0.25 mg tablet Take 1 Tab by mouth three (3) times daily. 20   Ashlyn Chi MD       REVIEW OF SYSTEMS:       Unable to obtain as patient is very confused    Objective:   VITALS:    Visit Vitals  /72 (BP 1 Location: Right upper arm, BP Patient Position: At rest)   Pulse (!) 111   Temp 98.7 °F (37.1 °C)   Resp 18   Ht 5' (1.524 m)   Wt 81.6 kg (180 lb)   LMP  (LMP Unknown) Comment: menopause   SpO2 95%   Breastfeeding No   BMI 35.15 kg/m²     Temp (24hrs), Av °F (36.7 °C), Min:96.8 °F (36 °C), Max:98.7 °F (37.1 °C)      PHYSICAL EXAM:     General: Lethargic  Eyes: sclera anicteric  Oral Cavity: Mucous membranes dry  Neck: no JVD  Chest: Fair bilateral air entry. No Wheezing or Rhonchi. No rales.   Heart: normal sounds  Abdomen: soft and non tender   : no alberts  Lower Extremities: no edema  Skin: no rash  Neuro: Lethargic but responds to questions somewhat   psychiatric: Unable to assess        LAB DATA REVIEWED:    Recent Results (from the past 24 hour(s))   HGB & HCT    Collection Time: 22  1:35 PM   Result Value Ref Range    HGB 7.1 (L) 11.5 - 16.0 g/dL    HCT 22.7 (L) 35.0 - 47.0 %   CBC WITH AUTOMATED DIFF    Collection Time: 22  6:13 AM   Result Value Ref Range    WBC 10.5 3.6 - 11.0 K/uL    RBC 3.25 (L) 3.80 - 5.20 M/uL    HGB 8.5 (L) 11.5 - 16.0 g/dL    HCT 26.0 (L) 35.0 - 47.0 %    MCV 80.0 80.0 - 99.0 FL    MCH 26.2 26.0 - 34.0 PG    MCHC 32.7 30.0 - 36.5 g/dL    RDW 21.2 (H) 11.5 - 14.5 %    PLATELET 735 (H) 002 - 400 K/uL    MPV 11.6 8.9 - 12.9 FL    NRBC 0.4 (H) 0.0  WBC    ABSOLUTE NRBC 0.04 (H) 0.00 - 0.01 K/uL    NEUTROPHILS 68 32 - 75 %    LYMPHOCYTES 23 12 - 49 %    MONOCYTES 1 (L) 5 - 13 %    EOSINOPHILS 0 0 - 7 %    BASOPHILS 1 0 - 1 %    MYELOCYTES 7 (H) 0 %    NRBC 3.0  WBC    IMMATURE GRANULOCYTES 0 %    ABS. NEUTROPHILS 7.1 1.8 - 8.0 K/UL    ABS. LYMPHOCYTES 2.4 0.8 - 3.5 K/UL    ABS. MONOCYTES 0.1 0.0 - 1.0 K/UL    ABS. EOSINOPHILS 0.0 0.0 - 0.4 K/UL    ABS. BASOPHILS 0.1 0.0 - 0.1 K/UL    ABSOLUTE NRBC 0.31 K/uL    ABS. IMM. GRANS. 0.0 K/UL    DF Manual      PLATELET COMMENTS Large Platelets      RBC COMMENTS Polychromasia  1+        RBC COMMENTS Target Cells  1+        RBC COMMENTS Stomatocytes  1+        RBC COMMENTS Anisocytosis  1+       RENAL FUNCTION PANEL    Collection Time: 03/12/22  6:13 AM   Result Value Ref Range    Sodium 150 (H) 136 - 145 mmol/L    Potassium 3.4 (L) 3.5 - 5.1 mmol/L    Chloride 121 (H) 97 - 108 mmol/L    CO2 17 (L) 21 - 32 mmol/L    Anion gap 12 5 - 15 mmol/L    Glucose 99 65 - 100 mg/dL    BUN 22 (H) 6 - 20 mg/dL    Creatinine 1.32 (H) 0.55 - 1.02 mg/dL    BUN/Creatinine ratio 17 12 - 20      GFR est AA 50 (L) >60 ml/min/1.73m2    GFR est non-AA 41 (L) >60 ml/min/1.73m2    Calcium 8.3 (L) 8.5 - 10.1 mg/dL    Phosphorus 3.0 2.6 - 4.7 mg/dL    Albumin 2.6 (L) 3.5 - 5.0 g/dL   C REACTIVE PROTEIN, QT    Collection Time: 03/12/22  9:30 AM   Result Value Ref Range    C-Reactive protein 12.30 (H) 0.00 - 0.60 mg/dL   HGB & HCT    Collection Time: 03/12/22 10:38 AM   Result Value Ref Range    HGB 8.2 (L) 11.5 - 16.0 g/dL    HCT 25.7 (L) 35.0 - 47.0 %       Recommendations/Plan:        1.  Acute kidney injury:  -Probably prerenal azotemia secondary to NSAIDs and presence of dehydration and decreased oral intake  -Creatinine was 1.9 on admission . Improved to 1.3 today he has no history of CKD has multiple LOLITA  -Advised her to not take NSAIDs at home  -Urine is suggestive of UTI  -We will continue IV fluids    2.  Metabolic acidosis;   -gap acidosis + this admission which goes against RTA .   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  -I started potassium citrate 10 mEq tid I will increase to 10 mg QID  -Continue bicarb drip with 150 mEq of bicarb. I will increase the bicarbonate as per hour  --arash to monitor CO2 levels and potassium levels     3. Hyponatremia  -Sodium 150 today. From water deficit likely because of decreased oral intake  -D5W as a carrier fluid for a bicarb drip  -Encouraged patient to drink more water. I spoke with the nursing staff also to encourage her to drink more fluids    4. Hypokalemia  -Potassium is 3.4. It has been replaced this morning  -Increase potassium citrate as above    5. Altered mentation  -She presented with altered mentation.   Could be from metabolic derangements/UTI  -Ammonia was normal     6.  Osteoarthritis/neck pains: Appears to be chronic   -NSAIDs were recently discontinued but she has restarted them again at home           ________________________________________________________________________  Signed: Izabella To MD

## 2022-03-12 NOTE — PROGRESS NOTES
Problem: Falls - Risk of  Goal: *Absence of Falls  Description: Document Darcy Litter Fall Risk and appropriate interventions in the flowsheet. Outcome: Progressing Towards Goal  Note: Fall Risk Interventions:  Mobility Interventions: Bed/chair exit alarm    Mentation Interventions: Bed/chair exit alarm    Medication Interventions: Bed/chair exit alarm    Elimination Interventions: Call light in reach    History of Falls Interventions: Bed/chair exit alarm         Problem: Pressure Injury - Risk of  Goal: *Prevention of pressure injury  Description: Document David Scale and appropriate interventions in the flowsheet.   Outcome: Progressing Towards Goal  Note: Pressure Injury Interventions:       Moisture Interventions: Absorbent underpads    Activity Interventions: PT/OT evaluation    Mobility Interventions: HOB 30 degrees or less    Nutrition Interventions: Document food/fluid/supplement intake    Friction and Shear Interventions: HOB 30 degrees or less

## 2022-03-12 NOTE — ANESTHESIA POSTPROCEDURE EVALUATION
Procedure(s):  ESOPHAGOGASTRODUODENOSCOPY (EGD).     MAC    Anesthesia Post Evaluation      Multimodal analgesia: multimodal analgesia not used between 6 hours prior to anesthesia start to PACU discharge  Patient location during evaluation: bedside  Patient participation: complete - patient participated  Level of consciousness: awake and alert  Pain management: adequate  Airway patency: patent  Anesthetic complications: no  Cardiovascular status: tachycardic  Respiratory status: acceptable  Hydration status: acceptable  Post anesthesia nausea and vomiting:  none  Final Post Anesthesia Temperature Assessment:  Normothermia (36.0-37.5 degrees C)      INITIAL Post-op Vital signs:   Vitals Value Taken Time   /66 03/12/22 0832   Temp 36 °C (96.8 °F) 03/12/22 0832   Pulse 122 03/12/22 0832   Resp 22 03/12/22 0832   SpO2 98 % 03/12/22 0832

## 2022-03-13 LAB
ANION GAP SERPL CALC-SCNC: 4 MMOL/L (ref 5–15)
BASOPHILS # BLD: 0 K/UL (ref 0–0.1)
BASOPHILS NFR BLD: 0 % (ref 0–1)
BUN SERPL-MCNC: 13 MG/DL (ref 6–20)
BUN/CREAT SERPL: 11 (ref 12–20)
CA-I BLD-MCNC: 8.7 MG/DL (ref 8.5–10.1)
CHLORIDE SERPL-SCNC: 110 MMOL/L (ref 97–108)
CO2 SERPL-SCNC: 29 MMOL/L (ref 21–32)
CREAT SERPL-MCNC: 1.18 MG/DL (ref 0.55–1.02)
DIFFERENTIAL METHOD BLD: ABNORMAL
EOSINOPHIL # BLD: 0.1 K/UL (ref 0–0.4)
EOSINOPHIL NFR BLD: 1 % (ref 0–7)
ERYTHROCYTE [DISTWIDTH] IN BLOOD BY AUTOMATED COUNT: 21.1 % (ref 11.5–14.5)
GLUCOSE SERPL-MCNC: 107 MG/DL (ref 65–100)
HCT VFR BLD AUTO: 26.9 % (ref 35–47)
HCT VFR BLD AUTO: 27.4 % (ref 35–47)
HGB BLD-MCNC: 8.5 G/DL (ref 11.5–16)
HGB BLD-MCNC: 8.8 G/DL (ref 11.5–16)
IMM GRANULOCYTES # BLD AUTO: 0 K/UL
IMM GRANULOCYTES NFR BLD AUTO: 0 %
LYMPHOCYTES # BLD: 2 K/UL (ref 0.8–3.5)
LYMPHOCYTES NFR BLD: 20 % (ref 12–49)
MAGNESIUM SERPL-MCNC: 2 MG/DL (ref 1.6–2.4)
MCH RBC QN AUTO: 25.9 PG (ref 26–34)
MCHC RBC AUTO-ENTMCNC: 32.1 G/DL (ref 30–36.5)
MCV RBC AUTO: 80.6 FL (ref 80–99)
METAMYELOCYTES NFR BLD MANUAL: 4 %
MONOCYTES # BLD: 0.7 K/UL (ref 0–1)
MONOCYTES NFR BLD: 7 % (ref 5–13)
MYELOCYTES NFR BLD MANUAL: 1 %
NEUTS BAND NFR BLD MANUAL: 1 % (ref 0–6)
NEUTS SEG # BLD: 6.8 K/UL (ref 1.8–8)
NEUTS SEG NFR BLD: 66 % (ref 32–75)
NRBC # BLD: 0.05 K/UL (ref 0–0.01)
NRBC BLD-RTO: 0.5 PER 100 WBC
PLATELET # BLD AUTO: 471 K/UL (ref 150–400)
PMV BLD AUTO: 11.9 FL (ref 8.9–12.9)
POTASSIUM SERPL-SCNC: 2.9 MMOL/L (ref 3.5–5.1)
PROCALCITONIN SERPL-MCNC: 88 NG/ML
RBC # BLD AUTO: 3.4 M/UL (ref 3.8–5.2)
RBC MORPH BLD: ABNORMAL
SODIUM SERPL-SCNC: 143 MMOL/L (ref 136–145)
WBC # BLD AUTO: 10.1 K/UL (ref 3.6–11)

## 2022-03-13 PROCEDURE — 65270000029 HC RM PRIVATE

## 2022-03-13 PROCEDURE — C9113 INJ PANTOPRAZOLE SODIUM, VIA: HCPCS | Performed by: INTERNAL MEDICINE

## 2022-03-13 PROCEDURE — 83735 ASSAY OF MAGNESIUM: CPT

## 2022-03-13 PROCEDURE — 74011000250 HC RX REV CODE- 250: Performed by: INTERNAL MEDICINE

## 2022-03-13 PROCEDURE — 74011250637 HC RX REV CODE- 250/637: Performed by: NURSE PRACTITIONER

## 2022-03-13 PROCEDURE — 85025 COMPLETE CBC W/AUTO DIFF WBC: CPT

## 2022-03-13 PROCEDURE — 36415 COLL VENOUS BLD VENIPUNCTURE: CPT

## 2022-03-13 PROCEDURE — 85018 HEMOGLOBIN: CPT

## 2022-03-13 PROCEDURE — 74011250637 HC RX REV CODE- 250/637: Performed by: INTERNAL MEDICINE

## 2022-03-13 PROCEDURE — 74011250636 HC RX REV CODE- 250/636: Performed by: NURSE PRACTITIONER

## 2022-03-13 PROCEDURE — 74011000258 HC RX REV CODE- 258: Performed by: NURSE PRACTITIONER

## 2022-03-13 PROCEDURE — 74011250636 HC RX REV CODE- 250/636: Performed by: INTERNAL MEDICINE

## 2022-03-13 PROCEDURE — 84145 PROCALCITONIN (PCT): CPT

## 2022-03-13 PROCEDURE — 74011000250 HC RX REV CODE- 250: Performed by: NURSE PRACTITIONER

## 2022-03-13 PROCEDURE — 80048 BASIC METABOLIC PNL TOTAL CA: CPT

## 2022-03-13 RX ORDER — POTASSIUM CHLORIDE 20 MEQ/1
40 TABLET, EXTENDED RELEASE ORAL
Status: COMPLETED | OUTPATIENT
Start: 2022-03-13 | End: 2022-03-13

## 2022-03-13 RX ADMIN — POLYETHYLENE GLYCOL 3350, SODIUM SULFATE ANHYDROUS, SODIUM BICARBONATE, SODIUM CHLORIDE, POTASSIUM CHLORIDE 4000 ML: 236; 22.74; 6.74; 5.86; 2.97 POWDER, FOR SOLUTION ORAL at 18:20

## 2022-03-13 RX ADMIN — PREGABALIN 150 MG: 150 CAPSULE ORAL at 20:18

## 2022-03-13 RX ADMIN — POTASSIUM CITRATE 10 MEQ: 5 TABLET ORAL at 12:50

## 2022-03-13 RX ADMIN — ROPINIROLE HYDROCHLORIDE 0.25 MG: 0.25 TABLET, FILM COATED ORAL at 20:20

## 2022-03-13 RX ADMIN — SODIUM BICARBONATE: 84 INJECTION, SOLUTION INTRAVENOUS at 10:41

## 2022-03-13 RX ADMIN — POTASSIUM CITRATE 10 MEQ: 5 TABLET ORAL at 17:13

## 2022-03-13 RX ADMIN — PAROXETINE HYDROCHLORIDE 40 MG: 20 TABLET, FILM COATED ORAL at 09:10

## 2022-03-13 RX ADMIN — PIPERACILLIN AND TAZOBACTAM 3.38 G: 3; .375 INJECTION, POWDER, LYOPHILIZED, FOR SOLUTION INTRAVENOUS at 04:19

## 2022-03-13 RX ADMIN — POTASSIUM CITRATE 10 MEQ: 5 TABLET ORAL at 20:20

## 2022-03-13 RX ADMIN — PREGABALIN 150 MG: 150 CAPSULE ORAL at 09:10

## 2022-03-13 RX ADMIN — POTASSIUM CHLORIDE 40 MEQ: 20 TABLET, EXTENDED RELEASE ORAL at 15:11

## 2022-03-13 RX ADMIN — PIPERACILLIN AND TAZOBACTAM 3.38 G: 3; .375 INJECTION, POWDER, LYOPHILIZED, FOR SOLUTION INTRAVENOUS at 20:18

## 2022-03-13 RX ADMIN — POTASSIUM CITRATE 10 MEQ: 5 TABLET ORAL at 09:10

## 2022-03-13 RX ADMIN — SODIUM CHLORIDE, PRESERVATIVE FREE 10 ML: 5 INJECTION INTRAVENOUS at 17:47

## 2022-03-13 RX ADMIN — SODIUM CHLORIDE, PRESERVATIVE FREE 10 ML: 5 INJECTION INTRAVENOUS at 05:24

## 2022-03-13 RX ADMIN — SODIUM CHLORIDE, PRESERVATIVE FREE 40 MG: 5 INJECTION INTRAVENOUS at 09:10

## 2022-03-13 RX ADMIN — SODIUM CHLORIDE, PRESERVATIVE FREE 40 MG: 5 INJECTION INTRAVENOUS at 20:18

## 2022-03-13 RX ADMIN — POTASSIUM CHLORIDE 40 MEQ: 1500 TABLET, EXTENDED RELEASE ORAL at 09:10

## 2022-03-13 RX ADMIN — ROPINIROLE HYDROCHLORIDE 0.25 MG: 0.25 TABLET, FILM COATED ORAL at 17:13

## 2022-03-13 RX ADMIN — PIPERACILLIN AND TAZOBACTAM 3.38 G: 3; .375 INJECTION, POWDER, LYOPHILIZED, FOR SOLUTION INTRAVENOUS at 12:50

## 2022-03-13 RX ADMIN — ROPINIROLE HYDROCHLORIDE 0.25 MG: 0.25 TABLET, FILM COATED ORAL at 09:09

## 2022-03-13 RX ADMIN — POTASSIUM CHLORIDE 40 MEQ: 20 TABLET, EXTENDED RELEASE ORAL at 17:13

## 2022-03-13 NOTE — ROUTINE PROCESS
Bedside and Verbal shift change report given to Tata (oncoming nurse) by David Jackson (offgoing nurse). Report included the following information SBAR.

## 2022-03-13 NOTE — PROGRESS NOTES
Renal Note    NAME:  Roger Avilez   :   1960   MRN:   [id number]     ATTENDING: Nikko Hamilton MD  PCP:  None    Date/Time:  3/13/2022 12:36 PM      Subjective:   REQUESTING PHYSICIAN:  REASON FOR CONSULT:    Metabolic acidosis    Patient seen in the room. She is still quite confused. Is on clear liquids. Sodium is improved to 143 today. However potassium decreased to 2.9. Bicarb is improved to 29. She is on bicarbonate drip and on potassium citrate      Past Medical History:   Diagnosis Date    Anemia     Chronic obstructive pulmonary disease (HCC)     Chronic pain     neck, slipped disc    Ectopic pregnancy     GERD (gastroesophageal reflux disease)     Heart murmur     History of low potassium     Hypokalemia     Ill-defined condition     murmur    Psychiatric disorder     depression      Past Surgical History:   Procedure Laterality Date    HX APPENDECTOMY      HX OTHER SURGICAL      needle removal left arm     Social History     Tobacco Use    Smoking status: Current Every Day Smoker     Packs/day: 0.25    Smokeless tobacco: Never Used   Substance Use Topics    Alcohol use: Never      Family History   Family history unknown: Yes       No Known Allergies   Prior to Admission medications    Medication Sig Start Date End Date Taking? Authorizing Provider   sodium bicarbonate 650 mg tablet Take 2 Tablets by mouth daily. 2/10/22   Provider, Historical   calcium carbonate (TUMS) 200 mg calcium (500 mg) chew Take 2 Tablets by mouth every four (4) hours as needed for Other (reflux). 21   Scooter DELGADO MD   ferrous sulfate 325 mg (65 mg iron) tablet Take 1 Tab by mouth Daily (before breakfast). 20   Aruna Chandra MD   omeprazole (PriLOSEC OTC) 20 mg tablet Take 1 Tab by mouth daily. 20   Aruna Chandra MD   PARoxetine (PAXIL) 40 mg tablet Take 1 Tab by mouth daily.  20   Aruna Chandra MD   [facility] 150 mg capsule Take 1 Cap by mouth two (2) times a day. Max Daily Amount: 300 mg. 20   Joann Crisostomo MD   rOPINIRole (REQUIP) 0.25 mg tablet Take 1 Tab by mouth three (3) times daily. 20   Mj Chi MD       REVIEW OF SYSTEMS:       Unable to obtain as patient is very confused    Objective:   VITALS:    Visit Vitals  BP (!) 153/80 (BP 1 Location: Left lower arm, BP Patient Position: At rest)   Pulse 70   Temp 98.2 °F (36.8 °C)   Resp 16   Ht 5' (1.524 m)   Wt 81.6 kg (180 lb)   LMP  (LMP Unknown) Comment: menopause   SpO2 95%   Breastfeeding No   BMI 35.15 kg/m²     Temp (24hrs), Av.3 °F (36.8 °C), Min:97.9 °F (36.6 °C), Max:98.6 °F (37 °C)      PHYSICAL EXAM:     General: Lethargic  Eyes: sclera anicteric  Oral Cavity: Mucous membranes dry  Neck: no JVD  Chest: Fair bilateral air entry. No Wheezing or Rhonchi. No rales.   Heart: normal sounds  Abdomen: soft and non tender   : no alberts  Lower Extremities: no edema  Skin: no rash  Neuro: Lethargic but responds to questions somewhat   psychiatric: Unable to assess        LAB DATA REVIEWED:    Recent Results (from the past 24 hour(s))   GLUCOSE, POC    Collection Time: 22  7:25 PM   Result Value Ref Range    Glucose (POC) 101 65 - 117 mg/dL    Performed by Jovita Guy    CBC WITH AUTOMATED DIFF    Collection Time: 22  3:30 AM   Result Value Ref Range    WBC 10.1 3.6 - 11.0 K/uL    RBC 3.40 (L) 3.80 - 5.20 M/uL    HGB 8.8 (L) 11.5 - 16.0 g/dL    HCT 27.4 (L) 35.0 - 47.0 %    MCV 80.6 80.0 - 99.0 FL    MCH 25.9 (L) 26.0 - 34.0 PG    MCHC 32.1 30.0 - 36.5 g/dL    RDW 21.1 (H) 11.5 - 14.5 %    PLATELET 888 (H) 470 - 400 K/uL    MPV 11.9 8.9 - 12.9 FL    NRBC 0.5 (H) 0.0  WBC    ABSOLUTE NRBC 0.05 (H) 0.00 - 0.01 K/uL    NEUTROPHILS 66 32 - 75 %    BAND NEUTROPHILS 1 0 - 6 %    LYMPHOCYTES 20 12 - 49 %    MONOCYTES 7 5 - 13 %    EOSINOPHILS 1 0 - 7 %    BASOPHILS 0 0 - 1 %    METAMYELOCYTES 4 (H) 0 % MYELOCYTES 1 (H) 0 %    IMMATURE GRANULOCYTES 0 %    ABS. NEUTROPHILS 6.8 1.8 - 8.0 K/UL    ABS. LYMPHOCYTES 2.0 0.8 - 3.5 K/UL    ABS. MONOCYTES 0.7 0.0 - 1.0 K/UL    ABS. EOSINOPHILS 0.1 0.0 - 0.4 K/UL    ABS. BASOPHILS 0.0 0.0 - 0.1 K/UL    ABS. IMM. GRANS. 0.0 K/UL    DF Manual      RBC COMMENTS Polychromasia  1+        RBC COMMENTS Hypochromia  1+        RBC COMMENTS Target Cells  1+       METABOLIC PANEL, BASIC    Collection Time: 03/13/22  3:30 AM   Result Value Ref Range    Sodium 143 136 - 145 mmol/L    Potassium 2.9 (L) 3.5 - 5.1 mmol/L    Chloride 110 (H) 97 - 108 mmol/L    CO2 29 21 - 32 mmol/L    Anion gap 4 (L) 5 - 15 mmol/L    Glucose 107 (H) 65 - 100 mg/dL    BUN 13 6 - 20 mg/dL    Creatinine 1.18 (H) 0.55 - 1.02 mg/dL    BUN/Creatinine ratio 11 (L) 12 - 20      GFR est AA 57 (L) >60 ml/min/1.73m2    GFR est non-AA 47 (L) >60 ml/min/1.73m2    Calcium 8.7 8.5 - 10.1 mg/dL   PROCALCITONIN    Collection Time: 03/13/22  3:30 AM   Result Value Ref Range    Procalcitonin 88.00 (H) 0 ng/mL   MAGNESIUM    Collection Time: 03/13/22  3:30 AM   Result Value Ref Range    Magnesium 2.0 1.6 - 2.4 mg/dL   HGB & HCT    Collection Time: 03/13/22 11:30 AM   Result Value Ref Range    HGB 8.5 (L) 11.5 - 16.0 g/dL    HCT 26.9 (L) 35.0 - 47.0 %       Recommendations/Plan:        1.  Acute kidney injury:  -Probably prerenal azotemia secondary to NSAIDs and presence of dehydration and decreased oral intake  -Creatinine was 1.9 on admission . Improved to 1.1 today   -no history of CKD has multiple LOLITA  -Advised her to not take NSAIDs at home  -Urine is suggestive of UTI  -Will continue IV fluids    2.  Metabolic acidosis;   -gap acidosis + this admission which goes against RTA .   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  -started potassium citrate 10 mEq tid I will increase to 10 mg QID  -Discontinue bicarb drip   -montinue to monitor CO2 levels and potassium levels     3. Hyponatremia  -Sodium 150-->143 today. From water deficit likely because of decreased oral intake  -Encouraged patient to drink more water. I spoke with the nursing staff also to encourage her to drink more fluids    4. Severe hypokalemia  -Potassium is 2.9. I will give potassium chloride 40 mEq 2 doses  -Increased potassium citrate as above    5. Altered mentation  -She presented with altered mentation.   Could be from metabolic derangements/UTI  -Ammonia was normal     6.  Osteoarthritis/neck pains: Appears to be chronic   -NSAIDs were recently discontinued but she has restarted them again at home           ________________________________________________________________________  Signed: Lashawn Falcon MD

## 2022-03-13 NOTE — PROGRESS NOTES
Hospitalist Progress Note         MARGARET Latham, FNP-C    Daily Progress Note: 3/13/2022    Young Kat is a 63 y.o. female with PMH of COPD, anemia and depression. Patient is a poor historian with majority of the history obtained from  and ED physician. She presented to the ED with chief complaint of altered mental status and generalized weakness and chills. In addition she also complains of shortness of breath, and sore throat for the past few days. She otherwise denies productive cough, abdominal pain, or UTI symptoms. Upon further questioning, she reports having black tarry stool for the past week. She also had episodes of nausea and vomiting about 2 weeks ago, but has since resolved. Admits to regular NSAIDs use.      In ED, vital signs within normal limits. Due to leukocytosis, WBC 19. Also has anemia, hemoglobin 8, baseline 10. Noted anion gap metabolic acidosis (chart review revealed previous diagnosis of RTA type I) and LOLITA on CKD. Lactic acid and ammonia negative. Urinalysis suggestive of UTI. Chest x-ray negative for acute findings, hiatal hernia noted. Metabolic acidosis noted on ABGs. Start sodium bicarb gtt. Nephrology following, sodium bicarb 150 meq. EGD 3/12: showing  esophagitis unspecified chronic gastritis without bleeding, no active bleeding in upper GI tract. Subjective:   Subjective   Patient examined alert with some confusion lying in bed  No acute distress noted on examination  No overnight events reported    Review of Systems:   Review of Systems   Unable to perform ROS: Mental acuity   Constitutional: Negative for fever. Respiratory: Negative for cough and shortness of breath. Cardiovascular: Negative for chest pain. Genitourinary: Negative for dysuria. Musculoskeletal: Negative for myalgias.          Objective:   Objective      Vitals:  Patient Vitals for the past 12 hrs:   Temp Pulse Resp BP SpO2   03/13/22 0729 97.9 °F (36.6 °C) 98 16 113/60 98 %   03/13/22 0320 98.6 °F (37 °C) 93 16 134/69 97 %        Physical Exam:  Physical Exam  Vitals and nursing note reviewed. Constitutional:       Appearance: Normal appearance. Cardiovascular:      Rate and Rhythm: Normal rate. Heart sounds: Normal heart sounds. Pulmonary:      Effort: Pulmonary effort is normal.      Breath sounds: Normal breath sounds. Abdominal:      General: Bowel sounds are normal.      Palpations: Abdomen is soft. Musculoskeletal:         General: Normal range of motion. Skin:     General: Skin is warm and dry. Capillary Refill: Capillary refill takes less than 2 seconds. Neurological:      Mental Status: She is alert. She is disoriented.    Psychiatric:         Mood and Affect: Mood normal.          Lab Results:  Recent Results (from the past 24 hour(s))   C REACTIVE PROTEIN, QT    Collection Time: 03/12/22  9:30 AM   Result Value Ref Range    C-Reactive protein 12.30 (H) 0.00 - 0.60 mg/dL   HGB & HCT    Collection Time: 03/12/22 10:38 AM   Result Value Ref Range    HGB 8.2 (L) 11.5 - 16.0 g/dL    HCT 25.7 (L) 35.0 - 47.0 %   GLUCOSE, POC    Collection Time: 03/12/22  7:25 PM   Result Value Ref Range    Glucose (POC) 101 65 - 117 mg/dL    Performed by Ese Ramirez    CBC WITH AUTOMATED DIFF    Collection Time: 03/13/22  3:30 AM   Result Value Ref Range    WBC 10.1 3.6 - 11.0 K/uL    RBC 3.40 (L) 3.80 - 5.20 M/uL    HGB 8.8 (L) 11.5 - 16.0 g/dL    HCT 27.4 (L) 35.0 - 47.0 %    MCV 80.6 80.0 - 99.0 FL    MCH 25.9 (L) 26.0 - 34.0 PG    MCHC 32.1 30.0 - 36.5 g/dL    RDW 21.1 (H) 11.5 - 14.5 %    PLATELET 381 (H) 541 - 400 K/uL    MPV 11.9 8.9 - 12.9 FL    NRBC 0.5 (H) 0.0  WBC    ABSOLUTE NRBC 0.05 (H) 0.00 - 0.01 K/uL    NEUTROPHILS 66 32 - 75 %    BAND NEUTROPHILS 1 0 - 6 %    LYMPHOCYTES 20 12 - 49 %    MONOCYTES 7 5 - 13 %    EOSINOPHILS 1 0 - 7 %    BASOPHILS 0 0 - 1 %    METAMYELOCYTES 4 (H) 0 %    MYELOCYTES 1 (H) 0 %    IMMATURE GRANULOCYTES 0 % ABS. NEUTROPHILS 6.8 1.8 - 8.0 K/UL    ABS. LYMPHOCYTES 2.0 0.8 - 3.5 K/UL    ABS. MONOCYTES 0.7 0.0 - 1.0 K/UL    ABS. EOSINOPHILS 0.1 0.0 - 0.4 K/UL    ABS. BASOPHILS 0.0 0.0 - 0.1 K/UL    ABS. IMM.  GRANS. 0.0 K/UL    DF Manual      RBC COMMENTS Polychromasia  1+        RBC COMMENTS Hypochromia  1+        RBC COMMENTS Target Cells  1+       METABOLIC PANEL, BASIC    Collection Time: 03/13/22  3:30 AM   Result Value Ref Range    Sodium 143 136 - 145 mmol/L    Potassium 2.9 (L) 3.5 - 5.1 mmol/L    Chloride 110 (H) 97 - 108 mmol/L    CO2 29 21 - 32 mmol/L    Anion gap 4 (L) 5 - 15 mmol/L    Glucose 107 (H) 65 - 100 mg/dL    BUN 13 6 - 20 mg/dL    Creatinine 1.18 (H) 0.55 - 1.02 mg/dL    BUN/Creatinine ratio 11 (L) 12 - 20      GFR est AA 57 (L) >60 ml/min/1.73m2    GFR est non-AA 47 (L) >60 ml/min/1.73m2    Calcium 8.7 8.5 - 10.1 mg/dL   PROCALCITONIN    Collection Time: 03/13/22  3:30 AM   Result Value Ref Range    Procalcitonin 88.00 (H) 0 ng/mL          Diagnostic Images:  CT Results  (Last 48 hours)    None          Current Medications:    Current Facility-Administered Medications:     potassium citrate (UROCIT-K5) tablet 10 mEq, 10 mEq, Oral, QID, Diana Elizabeth MD, 10 mEq at 03/13/22 0910    calcium carbonate (TUMS) chewable tablet 400 mg [elemental], 400 mg, Oral, Q4H PRN, Maribel Schilling MD    PARoxetine (PAXIL) tablet 40 mg, 40 mg, Oral, DAILY, Maribel Schilling MD, 40 mg at 03/13/22 0910    pregabalin (LYRICA) capsule 150 mg, 150 mg, Oral, BID, Juliano Schilling MD, 150 mg at 03/13/22 0910    rOPINIRole (REQUIP) tablet 0.25 mg, 0.25 mg, Oral, TID, Matt Levin MD, 0.25 mg at 03/13/22 0909    [Held by provider] sodium bicarbonate tablet 1,300 mg, 1,300 mg, Oral, DAILY, Maribel Schilling MD    sodium chloride (NS) flush 5-40 mL, 5-40 mL, IntraVENous, Q8H, Juilano Schilling MD, 10 mL at 03/13/22 0524    sodium chloride (NS) flush 5-40 mL, 5-40 mL, IntraVENous, PRN, Maribel Schilling MD    acetaminophen (TYLENOL) tablet 650 mg, 650 mg, Oral, Q6H PRN **OR** acetaminophen (TYLENOL) suppository 650 mg, 650 mg, Rectal, Q6H PRN, Vee Schilling MD    polyethylene glycol (MIRALAX) packet 17 g, 17 g, Oral, DAILY PRN, Vee Schilling MD    ondansetron (ZOFRAN ODT) tablet 4 mg, 4 mg, Oral, Q8H PRN **OR** ondansetron (ZOFRAN) injection 4 mg, 4 mg, IntraVENous, Q6H PRN, Vee Schilling MD    pantoprazole (PROTONIX) 40 mg in 0.9% sodium chloride 10 mL injection, 40 mg, IntraVENous, Q12H, Juliano Schilling MD, 40 mg at 03/13/22 0910    piperacillin-tazobactam (ZOSYN) 3.375 g in 0.9% sodium chloride (MBP/ADV) 100 mL MBP, 3.375 g, IntraVENous, Q8H, Autumn Vidal NP, Last Rate: 25 mL/hr at 03/13/22 0419, 3.375 g at 03/13/22 0419    benzocaine-menthoL (CEPACOL) lozenge 1 Lozenge, 1 Lozenge, Mucous Membrane, PRN, Guido Diana, NP    sodium bicarbonate (8.4%) 150 mEq in dextrose 5% 1,000 mL infusion, , IntraVENous, CONTINUOUS, Jaskaran Tavarez MD, Last Rate: 100 mL/hr at 03/12/22 1948, New Bag at 03/12/22 1948    0.9% sodium chloride infusion 250 mL, 250 mL, IntraVENous, PRN, Guido Lucila, NP       ASSESSMENT:    1. GI bleed  -Patient present with above symptomology  -Continue IV PPI  -GI following  -EGD showed esophagitis, no bleed  -Colonoscopy in am, NPO after midnight     2. Acute anemia  -Likely secondary to GI bleed  -No need for transfusion for now.  -H&H every 12 hours, transfuse as needed  -current hgb 8.8 s/p 1u prbc, baseline around 10.7     3. Altered mental status  - Likely secondary to metabolic acidosis and uti  - Monitor for now  - Follow ucx and bcx     4. Sepsis  - leukocytosis, tachycardia, elevated procal  - follow urine and blood cultures  - continue IV zosyn  - wbc downtrending  - procal and crp pending     5. RTA type 1   6. LOLITA on CKD  7. Metabolic acidosis  - Continue sodium bicarb gtt  - Nephrology managing  - Cr improving  - Bicarb much improved, dc NA bicarb gtt    8.  Suspected UTI  - UA not too impressive, Bacteria +1  - transitioned to IV zosyn    9. Hypokalemia  - replenish electrolytes       Full Code  Dvt Prophylaxis scd's  GI Prophylaxis protonix  Disposition:  -GI and nephrology following  -Colonoscopy tomorrow morning, NPO after midnight  -clinical improvement  -continue PPI, trend h/h  ~48hrs      NOK: Ike Baez [phone] (vm left)    Above treatment plan reviewed and discussed with patient in detail at bedside, all questions answered. Care Plan discussed with: Interdisciplinary team    Total time spent with patient: 35 minutes.     Kamar Adler NP

## 2022-03-13 NOTE — ROUTINE PROCESS
Bedside and Verbal shift change report given to Konrad Campos (oncoming nurse) by Saba Disla (offgoing nurse). Report included the following information SBAR.

## 2022-03-13 NOTE — PROGRESS NOTES
Problem: Falls - Risk of  Goal: *Absence of Falls  Description: Document Basilia Underwood Fall Risk and appropriate interventions in the flowsheet. Outcome: Progressing Towards Goal  Note: Fall Risk Interventions:  Mobility Interventions: Bed/chair exit alarm    Mentation Interventions: Bed/chair exit alarm    Medication Interventions: Bed/chair exit alarm    Elimination Interventions: Call light in reach    History of Falls Interventions: Bed/chair exit alarm         Problem: Pressure Injury - Risk of  Goal: *Prevention of pressure injury  Description: Document David Scale and appropriate interventions in the flowsheet.   Outcome: Progressing Towards Goal  Note: Pressure Injury Interventions:       Moisture Interventions: Absorbent underpads    Activity Interventions: Increase time out of bed    Mobility Interventions: HOB 30 degrees or less    Nutrition Interventions: Document food/fluid/supplement intake    Friction and Shear Interventions: Minimize layers

## 2022-03-13 NOTE — PROGRESS NOTES
Progress Note    Patient: Marce Villaseñor MRN: [id number]  SSN: [id number]    YOB: 1960  Age: 63 y.o. Sex: female      Admit Date: 3/10/2022    LOS: 2 days     Subjective:   GI in consultation for GI bleed-dark stools     3/13: Patient seen in room, family at bedside. Patient would wake up to answer question, but quickly dozes off to sleep. She denies abdominal pain, nausea or vomiting. Denies overt bleeding. HGB today is 8.5. Scheduled for colonoscopy tomorrow Monday.      EGD 3/12: showing  esophagitis   Unspecified chronic gastritis without bleeding   No active bleeding in upper GI tract     History of Present Illness: Marie Mendoza is a 63 y.o. female who is seen in consultation for GI bleed - dark stools.   Patient was admitted to the hospital with complain of altered mental status and generalized weakness. Patient is a poor historian. History gathered from chart documentation. Patient reportedly fell out of bed prior to arrival. Family reported patient has been having intermittent altered mental status over the past few days. She had nausea, vomiting, and diarrhea last week. Reported having dark liquid stools for the past week. She denies use of blood thinners but admits to regular NSAIDs use. She had several admissions with diagnosis of renal tubular acidosis most likely from chronic NSAID use. Denies acid reflux or heartburn. Objective:     Vitals:    03/12/22 1507 03/12/22 1953 03/13/22 0320 03/13/22 0729   BP: (!) 145/70 136/78 134/69 113/60   Pulse: 91 72 93 98   Resp: 16 16 16 16   Temp: 98.6 °F (37 °C) 98.5 °F (36.9 °C) 98.6 °F (37 °C) 97.9 °F (36.6 °C)   SpO2: 96% 96% 97% 98%   Weight:       Height:            Intake and Output:  Current Shift: No intake/output data recorded. Last three shifts: 03/11 1901 - 03/13 0700  In: 721.3 [P.O.:300]  Out: 2475 [Urine:2475]    Physical Exam:   Skin:  Extremities and face reveal no rashes. No cutler erythema.    HEENT: Sclerae anicteric. Extra-occular muscles are intact. No abnormal pigmentation of the lips. The neck is supple. Cardiovascular: Regular rate and rhythm. Respiratory:  Comfortable breathing with no accessory muscle use. GI:  Abdomen nondistended, soft, and nontender. No enlargement of the liver or spleen. No masses palpable. Rectal:  Deferred  Musculoskeletal: Generalized weakness. Neurological:  Sleepy, easily arousable. Psychiatric:  Period of confusion. Lymphatic:  No visible adenopathy      Lab/Data Review:  Recent Results (from the past 24 hour(s))   GLUCOSE, POC    Collection Time: 03/12/22  7:25 PM   Result Value Ref Range    Glucose (POC) 101 65 - 117 mg/dL    Performed by Irene Aquino    CBC WITH AUTOMATED DIFF    Collection Time: 03/13/22  3:30 AM   Result Value Ref Range    WBC 10.1 3.6 - 11.0 K/uL    RBC 3.40 (L) 3.80 - 5.20 M/uL    HGB 8.8 (L) 11.5 - 16.0 g/dL    HCT 27.4 (L) 35.0 - 47.0 %    MCV 80.6 80.0 - 99.0 FL    MCH 25.9 (L) 26.0 - 34.0 PG    MCHC 32.1 30.0 - 36.5 g/dL    RDW 21.1 (H) 11.5 - 14.5 %    PLATELET 704 (H) 172 - 400 K/uL    MPV 11.9 8.9 - 12.9 FL    NRBC 0.5 (H) 0.0  WBC    ABSOLUTE NRBC 0.05 (H) 0.00 - 0.01 K/uL    NEUTROPHILS 66 32 - 75 %    BAND NEUTROPHILS 1 0 - 6 %    LYMPHOCYTES 20 12 - 49 %    MONOCYTES 7 5 - 13 %    EOSINOPHILS 1 0 - 7 %    BASOPHILS 0 0 - 1 %    METAMYELOCYTES 4 (H) 0 %    MYELOCYTES 1 (H) 0 %    IMMATURE GRANULOCYTES 0 %    ABS. NEUTROPHILS 6.8 1.8 - 8.0 K/UL    ABS. LYMPHOCYTES 2.0 0.8 - 3.5 K/UL    ABS. MONOCYTES 0.7 0.0 - 1.0 K/UL    ABS. EOSINOPHILS 0.1 0.0 - 0.4 K/UL    ABS. BASOPHILS 0.0 0.0 - 0.1 K/UL    ABS. IMM.  GRANS. 0.0 K/UL    DF Manual      RBC COMMENTS Polychromasia  1+        RBC COMMENTS Hypochromia  1+        RBC COMMENTS Target Cells  1+       METABOLIC PANEL, BASIC    Collection Time: 03/13/22  3:30 AM   Result Value Ref Range    Sodium 143 136 - 145 mmol/L    Potassium 2.9 (L) 3.5 - 5.1 mmol/L    Chloride 110 (H) 97 - 108 mmol/L CO2 29 21 - 32 mmol/L    Anion gap 4 (L) 5 - 15 mmol/L    Glucose 107 (H) 65 - 100 mg/dL    BUN 13 6 - 20 mg/dL    Creatinine 1.18 (H) 0.55 - 1.02 mg/dL    BUN/Creatinine ratio 11 (L) 12 - 20      GFR est AA 57 (L) >60 ml/min/1.73m2    GFR est non-AA 47 (L) >60 ml/min/1.73m2    Calcium 8.7 8.5 - 10.1 mg/dL   PROCALCITONIN    Collection Time: 03/13/22  3:30 AM   Result Value Ref Range    Procalcitonin 88.00 (H) 0 ng/mL   MAGNESIUM    Collection Time: 03/13/22  3:30 AM   Result Value Ref Range    Magnesium 2.0 1.6 - 2.4 mg/dL   HGB & HCT    Collection Time: 03/13/22 11:30 AM   Result Value Ref Range    HGB 8.5 (L) 11.5 - 16.0 g/dL    HCT 26.9 (L) 35.0 - 47.0 %              XR CHEST PORT   Final Result      No consolidation in the lungs. Hiatal hernia. Follow-up as clinically indicated. Assessment:     Principal Problem:    GI bleed (3/11/2022)        Plan:   1. GI bleed - dark stool    -Stool occult positive    -Hgb 8.5. Monitor hgb and transfuse as needed.    -continue PPI    -repeat CBC in am.    -3/12 EGD showing esophagitis, chronic gastritis without bleeding. No active bleeding in upper GI tract     -Schedule for colonoscopy in am. Please get consent. May start drinking liquid prep at 1500. Please assist patient. NPO post midnight. 2. Leukocytosis     -WBC 10.1     -Urine showing pyuria     -Currently on Zosyn  3. Acute anemia     -most likely d/t GI bleed     -Hgb 8.5. Monitor and transfuse to keep >7.0     -For EGD in am.  4. LOLITA on CKD     -creatinine elevated on admission - 1.18     -Nephrology following - Probably prerenal azotemia secondary to NSAIDs and presence of dehydration and decreased oral intake     -continue IVF  5. Altered mental status     -possibly from UTI, Zosyn started     -Normal ammonia 24. Patient discussed with Dr Gonzalez  and agrees to above impression and plan. Thank you for allowing me to participate in this patients care    Signed By: Marybeth Goltz.  ROGELIO Renee March 13, 2022

## 2022-03-14 ENCOUNTER — ANESTHESIA EVENT (OUTPATIENT)
Dept: ENDOSCOPY | Age: 62
DRG: 871 | End: 2022-03-14
Payer: COMMERCIAL

## 2022-03-14 ENCOUNTER — APPOINTMENT (OUTPATIENT)
Dept: ENDOSCOPY | Age: 62
DRG: 871 | End: 2022-03-14
Attending: INTERNAL MEDICINE
Payer: COMMERCIAL

## 2022-03-14 ENCOUNTER — ANESTHESIA (OUTPATIENT)
Dept: ENDOSCOPY | Age: 62
DRG: 871 | End: 2022-03-14
Payer: COMMERCIAL

## 2022-03-14 LAB
ANION GAP SERPL CALC-SCNC: 4 MMOL/L (ref 5–15)
BASOPHILS # BLD: 0.1 K/UL (ref 0–0.1)
BASOPHILS NFR BLD: 1 % (ref 0–1)
BUN SERPL-MCNC: 5 MG/DL (ref 6–20)
BUN/CREAT SERPL: 5 (ref 12–20)
CA-I BLD-MCNC: 8.9 MG/DL (ref 8.5–10.1)
CHLORIDE SERPL-SCNC: 107 MMOL/L (ref 97–108)
CO2 SERPL-SCNC: 34 MMOL/L (ref 21–32)
CREAT SERPL-MCNC: 0.97 MG/DL (ref 0.55–1.02)
CRP SERPL-MCNC: 7.57 MG/DL (ref 0–0.6)
DIFFERENTIAL METHOD BLD: ABNORMAL
EOSINOPHIL # BLD: 0.1 K/UL (ref 0–0.4)
EOSINOPHIL NFR BLD: 1 % (ref 0–7)
ERYTHROCYTE [DISTWIDTH] IN BLOOD BY AUTOMATED COUNT: 21.8 % (ref 11.5–14.5)
GLUCOSE SERPL-MCNC: 103 MG/DL (ref 65–100)
HCT VFR BLD AUTO: 29.6 % (ref 35–47)
HCT VFR BLD AUTO: 30.1 % (ref 35–47)
HGB BLD-MCNC: 9.4 G/DL (ref 11.5–16)
HGB BLD-MCNC: 9.4 G/DL (ref 11.5–16)
IMM GRANULOCYTES # BLD AUTO: 0.6 K/UL (ref 0–0.04)
IMM GRANULOCYTES NFR BLD AUTO: 6 % (ref 0–0.5)
LYMPHOCYTES # BLD: 2.4 K/UL (ref 0.8–3.5)
LYMPHOCYTES NFR BLD: 24 % (ref 12–49)
MCH RBC QN AUTO: 25.8 PG (ref 26–34)
MCHC RBC AUTO-ENTMCNC: 31.8 G/DL (ref 30–36.5)
MCV RBC AUTO: 81.3 FL (ref 80–99)
MONOCYTES # BLD: 0.5 K/UL (ref 0–1)
MONOCYTES NFR BLD: 5 % (ref 5–13)
NEUTS SEG # BLD: 6.5 K/UL (ref 1.8–8)
NEUTS SEG NFR BLD: 63 % (ref 32–75)
NRBC # BLD: 0.04 K/UL (ref 0–0.01)
NRBC BLD-RTO: 0.4 PER 100 WBC
PLATELET # BLD AUTO: 521 K/UL (ref 150–400)
PMV BLD AUTO: 11.9 FL (ref 8.9–12.9)
POTASSIUM SERPL-SCNC: 3.2 MMOL/L (ref 3.5–5.1)
PROCALCITONIN SERPL-MCNC: 44.49 NG/ML
RBC # BLD AUTO: 3.64 M/UL (ref 3.8–5.2)
RBC MORPH BLD: ABNORMAL
SODIUM SERPL-SCNC: 145 MMOL/L (ref 136–145)
WBC # BLD AUTO: 10.2 K/UL (ref 3.6–11)

## 2022-03-14 PROCEDURE — 92526 ORAL FUNCTION THERAPY: CPT

## 2022-03-14 PROCEDURE — 86140 C-REACTIVE PROTEIN: CPT

## 2022-03-14 PROCEDURE — 80048 BASIC METABOLIC PNL TOTAL CA: CPT

## 2022-03-14 PROCEDURE — 74011000258 HC RX REV CODE- 258: Performed by: NURSE PRACTITIONER

## 2022-03-14 PROCEDURE — 85025 COMPLETE CBC W/AUTO DIFF WBC: CPT

## 2022-03-14 PROCEDURE — 85018 HEMOGLOBIN: CPT

## 2022-03-14 PROCEDURE — 2709999900 HC NON-CHARGEABLE SUPPLY: Performed by: INTERNAL MEDICINE

## 2022-03-14 PROCEDURE — 65270000029 HC RM PRIVATE

## 2022-03-14 PROCEDURE — 74011250636 HC RX REV CODE- 250/636: Performed by: NURSE ANESTHETIST, CERTIFIED REGISTERED

## 2022-03-14 PROCEDURE — 88305 TISSUE EXAM BY PATHOLOGIST: CPT

## 2022-03-14 PROCEDURE — 74011250636 HC RX REV CODE- 250/636: Performed by: NURSE PRACTITIONER

## 2022-03-14 PROCEDURE — 74011250637 HC RX REV CODE- 250/637: Performed by: INTERNAL MEDICINE

## 2022-03-14 PROCEDURE — 84145 PROCALCITONIN (PCT): CPT

## 2022-03-14 PROCEDURE — 76040000007: Performed by: INTERNAL MEDICINE

## 2022-03-14 PROCEDURE — 36415 COLL VENOUS BLD VENIPUNCTURE: CPT

## 2022-03-14 PROCEDURE — 0DBN8ZX EXCISION OF SIGMOID COLON, VIA NATURAL OR ARTIFICIAL OPENING ENDOSCOPIC, DIAGNOSTIC: ICD-10-PCS | Performed by: INTERNAL MEDICINE

## 2022-03-14 PROCEDURE — 76060000032 HC ANESTHESIA 0.5 TO 1 HR: Performed by: INTERNAL MEDICINE

## 2022-03-14 PROCEDURE — 77030019988 HC FCPS ENDOSC DISP BSC -B: Performed by: INTERNAL MEDICINE

## 2022-03-14 PROCEDURE — 74011000250 HC RX REV CODE- 250: Performed by: INTERNAL MEDICINE

## 2022-03-14 RX ORDER — SODIUM CHLORIDE, SODIUM LACTATE, POTASSIUM CHLORIDE, CALCIUM CHLORIDE 600; 310; 30; 20 MG/100ML; MG/100ML; MG/100ML; MG/100ML
INJECTION, SOLUTION INTRAVENOUS
Status: DISCONTINUED | OUTPATIENT
Start: 2022-03-14 | End: 2022-03-14 | Stop reason: HOSPADM

## 2022-03-14 RX ORDER — POTASSIUM CITRATE 5 MEQ/1
10 TABLET, EXTENDED RELEASE ORAL 2 TIMES DAILY
Status: DISCONTINUED | OUTPATIENT
Start: 2022-03-14 | End: 2022-03-15

## 2022-03-14 RX ORDER — PANTOPRAZOLE SODIUM 40 MG/1
40 TABLET, DELAYED RELEASE ORAL
Status: DISCONTINUED | OUTPATIENT
Start: 2022-03-15 | End: 2022-03-16 | Stop reason: HOSPADM

## 2022-03-14 RX ORDER — POTASSIUM CHLORIDE 20 MEQ/1
40 TABLET, EXTENDED RELEASE ORAL
Status: COMPLETED | OUTPATIENT
Start: 2022-03-14 | End: 2022-03-14

## 2022-03-14 RX ORDER — PROPOFOL 10 MG/ML
INJECTION, EMULSION INTRAVENOUS AS NEEDED
Status: DISCONTINUED | OUTPATIENT
Start: 2022-03-14 | End: 2022-03-14 | Stop reason: HOSPADM

## 2022-03-14 RX ADMIN — PIPERACILLIN AND TAZOBACTAM 3.38 G: 3; .375 INJECTION, POWDER, LYOPHILIZED, FOR SOLUTION INTRAVENOUS at 20:05

## 2022-03-14 RX ADMIN — SODIUM CHLORIDE, PRESERVATIVE FREE 10 ML: 5 INJECTION INTRAVENOUS at 21:15

## 2022-03-14 RX ADMIN — ACETAMINOPHEN 650 MG: 325 TABLET ORAL at 20:05

## 2022-03-14 RX ADMIN — PIPERACILLIN AND TAZOBACTAM 3.38 G: 3; .375 INJECTION, POWDER, LYOPHILIZED, FOR SOLUTION INTRAVENOUS at 04:40

## 2022-03-14 RX ADMIN — PAROXETINE HYDROCHLORIDE 40 MG: 20 TABLET, FILM COATED ORAL at 11:59

## 2022-03-14 RX ADMIN — PROPOFOL 50 MG: 10 INJECTION, EMULSION INTRAVENOUS at 09:23

## 2022-03-14 RX ADMIN — PROPOFOL 20 MG: 10 INJECTION, EMULSION INTRAVENOUS at 09:26

## 2022-03-14 RX ADMIN — PROPOFOL 50 MG: 10 INJECTION, EMULSION INTRAVENOUS at 09:19

## 2022-03-14 RX ADMIN — PROPOFOL 50 MG: 10 INJECTION, EMULSION INTRAVENOUS at 09:16

## 2022-03-14 RX ADMIN — PREGABALIN 150 MG: 150 CAPSULE ORAL at 21:15

## 2022-03-14 RX ADMIN — PROPOFOL 50 MG: 10 INJECTION, EMULSION INTRAVENOUS at 09:14

## 2022-03-14 RX ADMIN — POTASSIUM CITRATE 10 MEQ: 5 TABLET ORAL at 21:15

## 2022-03-14 RX ADMIN — POTASSIUM CHLORIDE 40 MEQ: 20 TABLET, EXTENDED RELEASE ORAL at 11:59

## 2022-03-14 RX ADMIN — PIPERACILLIN AND TAZOBACTAM 3.38 G: 3; .375 INJECTION, POWDER, LYOPHILIZED, FOR SOLUTION INTRAVENOUS at 11:59

## 2022-03-14 RX ADMIN — ROPINIROLE HYDROCHLORIDE 0.25 MG: 0.25 TABLET, FILM COATED ORAL at 17:00

## 2022-03-14 RX ADMIN — SODIUM CHLORIDE, POTASSIUM CHLORIDE, SODIUM LACTATE AND CALCIUM CHLORIDE: 600; 310; 30; 20 INJECTION, SOLUTION INTRAVENOUS at 09:08

## 2022-03-14 RX ADMIN — SODIUM CHLORIDE, PRESERVATIVE FREE 10 ML: 5 INJECTION INTRAVENOUS at 17:00

## 2022-03-14 RX ADMIN — ROPINIROLE HYDROCHLORIDE 0.25 MG: 0.25 TABLET, FILM COATED ORAL at 21:15

## 2022-03-14 RX ADMIN — SODIUM CHLORIDE, PRESERVATIVE FREE 10 ML: 5 INJECTION INTRAVENOUS at 06:15

## 2022-03-14 NOTE — ANESTHESIA PREPROCEDURE EVALUATION
Relevant Problems   RENAL FAILURE   (+) Acute kidney injury ([facility] [address]       Anesthetic History   No history of anesthetic complications            Review of Systems / Medical History  Patient summary reviewed, nursing notes reviewed and pertinent labs reviewed    Pulmonary    COPD      Smoker         Neuro/Psych         Psychiatric history    Comments: ALTERED MENTAL STATUS  Cardiovascular  Within defined limits                     GI/Hepatic/Renal     GERD          Comments: GI BLEED Endo/Other        Anemia (Hb/Hct 9.4/29. 6)     Other Findings   Comments: GENERALIZED WEAKNESS. Na+ 150    Hx OF FALL. Patient is very sleepy.          Physical Exam    Airway  Mallampati: IV  TM Distance: < 4 cm    Mouth opening: Normal     Cardiovascular    Rhythm: regular  Rate: normal    Murmur     Dental         Pulmonary      Decreased breath sounds           Abdominal  Abdominal exam normal       Other Findings            Anesthetic Plan    ASA: 2, emergent  Anesthesia type: MAC          Induction: Intravenous  Anesthetic plan and risks discussed with: Patient

## 2022-03-14 NOTE — PROGRESS NOTES
SPEECH LANGUAGE PATHOLOGY DYSPHAGIA TREATMENT  Patient: Young Kat (5 y.o. female)  Date: 3/14/2022  Diagnosis: GI bleed [K92.2] GI bleed  Procedure(s) (LRB):  COLONOSCOPY (N/A)  COLON BIOPSY (N/A) Day of Surgery  Precautions: fall     ASSESSMENT:  Patient seen at bedside.  present in room. Patient confused and not oriented to place or time. Patient c/o of sore throat.  states onset of sore throat about 8 days ago. Patient has colonoscopy this am and per chart review EGD 3/12. EGD noted esophagitis. Patient currently on a PPI. Esophagitis could be the cause of patient's sore throat.  denies patient having any swallowing difficulty at home. Administered thin liquids via straw. Patient ingested w/ consecutive swallows. Swallow initiation timely. HLE and protraction adequate to digital palpation. No overt s/sx of aspiration noted. Slow mastication of hard solid cracker. No oral residual and pharyngeal response adequate. Oral and pharyngeal swallow WFL at this time. Per colonoscopy recs, \" ok to advance diet as tolerated. \"   No further acute ST services warranted at this time. Unsure of baseline cognition at this time. Aspiration precautions to continue s/t confusion. If sore throat continues, may need to consider an antifungal such as nystatin. PLAN:  Recommendations and Planned Interventions:  Advance diet to regular, thin liquids. Aspiration precautions s/t confusion. May need to consider antifungal such as nystatin if sore throat continues for the esophagitis. No further acute ST services to follow at this time. Please reconsult if change in status. Discharge Recommendations:  Home Health     SUBJECTIVE:   Patient confused. Seen at bedside.  present in room.      OBJECTIVE:     CXR Results  (Last 48 hours)      None          CT Results  (Last 48 hours)      None           Cognitive and Communication Status:  Neurologic State: Drowsy,Confused  Orientation Level: Oriented to person,Oriented to place,Disoriented to time  Cognition: Follows commands      Pain:  Pain Scale 1: Numeric (0 - 10)  Pain Intensity 1: 0       After treatment:   Patient left in no apparent distress in bed, Call bell within reach, Nursing notified, and Caregiver / family present    COMMUNICATION/EDUCATION:   Patient was educated regarding her deficit(s) of dysphagia, swallow safety precautions, diet recs and POC. She demonstrated Fair understanding as evidenced by impaired cognition. The patient's plan of care including recommendations, planned interventions, and recommended diet changes were discussed with: Registered nurse. Breonna Hall, SLP M.S. CCC-SLP  Time Calculation: 15 mins           Problem: Dysphagia (Adult)  Goal: *Acute Goals and Plan of Care (Insert Text)  Description: Speech Therapy Goals  Initiated 3/12/2022  -Patient will tolerate thin liquid diet without signs/symptoms of aspiration within 7 day(s). [ ] Not met  [ Radha Sport  MET   [ ] Progressing  [ ] Kyle Sandhu  -Patient will tolerate PO trials with clinician without signs/symptoms of aspiration within 7 day(s). [ ] Not met  [ Radha Sport  MET   [ ] Progressing  [ ] Kyle Mince  -Patient will demonstrate understanding of swallow safety precautions and aspiration precautions, diet recs within 7 day(s).         [ ] Not met  [ Radha Sport  MET   [ ] Progressing  [ ] Discontinue   Outcome: Resolved/Met

## 2022-03-14 NOTE — PROGRESS NOTES
Advance diet to regular, thin liquids. Aspiration precautions s/t confusion. May need to consider antifungal such as nystatin if sore throat continues for the esophagitis. No further acute ST services to follow at this time. Please reconsult if change in status.

## 2022-03-14 NOTE — ROUTINE PROCESS
Bedside and Verbal shift change report given to Jacquie (oncoming nurse) by Ernie Wilkins (offgoing nurse). Report included the following information SBAR.

## 2022-03-14 NOTE — PROGRESS NOTES
Progress Note    Patient: Enrique Cheema MRN: [id number]  SSN: [id number]    YOB: 1960  Age: 63 y.o. Sex: female      Admit Date: 3/10/2022    LOS: 3 days     Subjective:   GI in consultation for GI bleed-dark stools     3/14: Patient seen in room, remains sleepy, arousable. She denies abdominal pain, nausea or vomiting. Denies overt bleeding. Colonoscopy today showed second degree hemorrhoids, diverticulosis, and a polyp that was removed and sent to pathology. HGB today is 9.4.        Colonoscopyy 3/14 - second degree hemorrhoids, diverticulosis, polyp. EGD 3/12: showing  esophagitis   Unspecified chronic gastritis without bleeding   No active bleeding in upper GI tract     History of Present Illness: Marie Mendoza is a 63 y.o. female who is seen in consultation for GI bleed - dark stools.   Patient was admitted to the hospital with complain of altered mental status and generalized weakness. Patient is a poor historian. History gathered from chart documentation. Patient reportedly fell out of bed prior to arrival. Family reported patient has been having intermittent altered mental status over the past few days. She had nausea, vomiting, and diarrhea last week. Reported having dark liquid stools for the past week. She denies use of blood thinners but admits to regular NSAIDs use. She had several admissions with diagnosis of renal tubular acidosis most likely from chronic NSAID use. Denies acid reflux or heartburn.        Objective:     Vitals:    03/14/22 0942 03/14/22 0945 03/14/22 0950 03/14/22 1016   BP: 137/70 137/78 125/75 (!) 145/71   Pulse: 86 73 76 70   Resp: 16 15 16 16   Temp: 98.6 °F (37 °C)   97.6 °F (36.4 °C)   SpO2: 95% 96% 95% 93%   Weight:       Height:            Intake and Output:  Current Shift: No intake/output data recorded. Last three shifts: 03/12 1901 - 03/14 0700  In: -   Out: 1300 [Urine:1300]    Physical Exam:   Skin:  Extremities and face reveal no rashes.  No cutler erythema. HEENT: Sclerae anicteric. Extra-occular muscles are intact. No abnormal pigmentation of the lips. The neck is supple. Cardiovascular: Regular rate and rhythm. Respiratory:  Comfortable breathing with no accessory muscle use. GI:  Abdomen nondistended, soft, and nontender. No enlargement of the liver or spleen. No masses palpable. Rectal:  Deferred  Musculoskeletal:Generalized weakness  Neurological:  Sleepy, arousable with periods of confusion  Psychiatric: Not agitated  Lymphatic:  No visible adenopathy      Lab/Data Review:  Recent Results (from the past 24 hour(s))   CBC WITH AUTOMATED DIFF    Collection Time: 03/14/22  1:47 AM   Result Value Ref Range    WBC 10.2 3.6 - 11.0 K/uL    RBC 3.64 (L) 3.80 - 5.20 M/uL    HGB 9.4 (L) 11.5 - 16.0 g/dL    HCT 29.6 (L) 35.0 - 47.0 %    MCV 81.3 80.0 - 99.0 FL    MCH 25.8 (L) 26.0 - 34.0 PG    MCHC 31.8 30.0 - 36.5 g/dL    RDW 21.8 (H) 11.5 - 14.5 %    PLATELET 014 (H) 772 - 400 K/uL    MPV 11.9 8.9 - 12.9 FL    NRBC 0.4 (H) 0.0  WBC    ABSOLUTE NRBC 0.04 (H) 0.00 - 0.01 K/uL    NEUTROPHILS 63 32 - 75 %    LYMPHOCYTES 24 12 - 49 %    MONOCYTES 5 5 - 13 %    EOSINOPHILS 1 0 - 7 %    BASOPHILS 1 0 - 1 %    IMMATURE GRANULOCYTES 6 (H) 0 - 0.5 %    ABS. NEUTROPHILS 6.5 1.8 - 8.0 K/UL    ABS. LYMPHOCYTES 2.4 0.8 - 3.5 K/UL    ABS. MONOCYTES 0.5 0.0 - 1.0 K/UL    ABS. EOSINOPHILS 0.1 0.0 - 0.4 K/UL    ABS. BASOPHILS 0.1 0.0 - 0.1 K/UL    ABS. IMM.  GRANS. 0.6 (H) 0.00 - 0.04 K/UL    DF AUTOMATED      RBC COMMENTS Target Cells  1+        RBC COMMENTS Anisocytosis  1+        RBC COMMENTS Polychromasia  1+       METABOLIC PANEL, BASIC    Collection Time: 03/14/22  1:47 AM   Result Value Ref Range    Sodium 145 136 - 145 mmol/L    Potassium 3.2 (L) 3.5 - 5.1 mmol/L    Chloride 107 97 - 108 mmol/L    CO2 34 (H) 21 - 32 mmol/L    Anion gap 4 (L) 5 - 15 mmol/L    Glucose 103 (H) 65 - 100 mg/dL    BUN 5 (L) 6 - 20 mg/dL    Creatinine 0.97 0.55 - 1.02 mg/dL BUN/Creatinine ratio 5 (L) 12 - 20      GFR est AA >60 >60 ml/min/1.73m2    GFR est non-AA 59 (L) >60 ml/min/1.73m2    Calcium 8.9 8.5 - 10.1 mg/dL   C REACTIVE PROTEIN, QT    Collection Time: 03/14/22  1:47 AM   Result Value Ref Range    C-Reactive protein 7.57 (H) 0.00 - 0.60 mg/dL   PROCALCITONIN    Collection Time: 03/14/22  1:47 AM   Result Value Ref Range    Procalcitonin 44.49 (H) 0 ng/mL   HGB & HCT    Collection Time: 03/14/22 12:47 PM   Result Value Ref Range    HGB 9.4 (L) 11.5 - 16.0 g/dL    HCT 30.1 (L) 35.0 - 47.0 %              XR CHEST PORT   Final Result      No consolidation in the lungs. Hiatal hernia. Follow-up as clinically indicated. Assessment:     Principal Problem:    GI bleed (3/11/2022)        Plan:   1. GI bleed - dark stool    -Stool occult positive    -Hgb 9.4. Monitor hgb and transfuse as needed.    -continue PPI    -repeat CBC in am.    -3/14 Colonoscopy second degree hemorrhoids, diverticulosis, polyp.    -3/12 EGD showing esophagitis, chronic gastritis without bleeding. No active bleeding in upper GI tract     -advance diet as tolerated. 2. Leukocytosis     -WBC 10.2     -Urine showing pyuria     -Currently on Zosyn  3. Acute anemia     -most likely d/t GI bleed     -Hgb 9.4. Monitor and transfuse to keep >7.0     -For EGD in am.  4. LOLITA on CKD     -creatinine elevated on admission - 1.18     -creatinine today 0.97     -Nephrology following - Probably prerenal azotemia secondary to NSAIDs and presence of dehydration and decreased oral intake     -continue IVF  5. Altered mental status     -possibly from UTI, Zosyn started     -Normal ammonia 24.        Patient discussed with Dr Nilay Estes and agrees to above impression and plan. Thank you for allowing me to participate in this patients care    Signed By: Jerzy Ferguson.  ROGELIO Renee     March 14, 2022

## 2022-03-14 NOTE — PROGRESS NOTES
Hospitalist Progress Note    Subjective:   Daily Progress Note: 3/14/2022 11:58 AM    Altered mental status has resolved. Patient denies pain although endorses weakness    Current Facility-Administered Medications   Medication Dose Route Frequency    potassium citrate (UROCIT-K5) tablet 10 mEq  10 mEq Oral BID    potassium chloride (K-DUR, KLOR-CON M20) SR tablet 40 mEq  40 mEq Oral NOW    calcium carbonate (TUMS) chewable tablet 400 mg [elemental]  400 mg Oral Q4H PRN    PARoxetine (PAXIL) tablet 40 mg  40 mg Oral DAILY    pregabalin (LYRICA) capsule 150 mg  150 mg Oral BID    rOPINIRole (REQUIP) tablet 0.25 mg  0.25 mg Oral TID    sodium chloride (NS) flush 5-40 mL  5-40 mL IntraVENous Q8H    sodium chloride (NS) flush 5-40 mL  5-40 mL IntraVENous PRN    acetaminophen (TYLENOL) tablet 650 mg  650 mg Oral Q6H PRN    Or    acetaminophen (TYLENOL) suppository 650 mg  650 mg Rectal Q6H PRN    polyethylene glycol (MIRALAX) packet 17 g  17 g Oral DAILY PRN    ondansetron (ZOFRAN ODT) tablet 4 mg  4 mg Oral Q8H PRN    Or    ondansetron (ZOFRAN) injection 4 mg  4 mg IntraVENous Q6H PRN    piperacillin-tazobactam (ZOSYN) 3.375 g in 0.9% sodium chloride (MBP/ADV) 100 mL MBP  3.375 g IntraVENous Q8H    benzocaine-menthoL (CEPACOL) lozenge 1 Lozenge  1 Lozenge Mucous Membrane PRN    0.9% sodium chloride infusion 250 mL  250 mL IntraVENous PRN        Review of Systems  Review of Systems   Constitutional: Positive for malaise/fatigue. Negative for chills and fever. HENT: Negative. Respiratory: Negative for cough and shortness of breath. Cardiovascular: Positive for chest pain. Gastrointestinal: Positive for blood in stool. Negative for abdominal pain, diarrhea, nausea and vomiting. Genitourinary: Negative. Musculoskeletal: Negative. Skin: Negative. Neurological: Negative. Psychiatric/Behavioral: Negative.              Objective:     Visit Vitals  BP (!) 145/71 (BP 1 Location: Left upper arm, BP Patient Position: At rest)   Pulse 70   Temp 97.6 °F (36.4 °C)   Resp 16   Ht 5' (1.524 m)   Wt 81.6 kg (180 lb)   LMP  (LMP Unknown) Comment: menopause   SpO2 93%   Breastfeeding No   BMI 35.15 kg/m²      O2 Device: None (Room air)    Temp (24hrs), Av °F (36.7 °C), Min:97.6 °F (36.4 °C), Max:98.6 °F (37 °C)      No intake/output data recorded.  1901 -  0700  In: -   Out: 1300 [Urine:1300]    Recent Results (from the past 24 hour(s))   CBC WITH AUTOMATED DIFF    Collection Time: 22  1:47 AM   Result Value Ref Range    WBC 10.2 3.6 - 11.0 K/uL    RBC 3.64 (L) 3.80 - 5.20 M/uL    HGB 9.4 (L) 11.5 - 16.0 g/dL    HCT 29.6 (L) 35.0 - 47.0 %    MCV 81.3 80.0 - 99.0 FL    MCH 25.8 (L) 26.0 - 34.0 PG    MCHC 31.8 30.0 - 36.5 g/dL    RDW 21.8 (H) 11.5 - 14.5 %    PLATELET 878 (H) 776 - 400 K/uL    MPV 11.9 8.9 - 12.9 FL    NRBC 0.4 (H) 0.0  WBC    ABSOLUTE NRBC 0.04 (H) 0.00 - 0.01 K/uL    NEUTROPHILS 63 32 - 75 %    LYMPHOCYTES 24 12 - 49 %    MONOCYTES 5 5 - 13 %    EOSINOPHILS 1 0 - 7 %    BASOPHILS 1 0 - 1 %    IMMATURE GRANULOCYTES 6 (H) 0 - 0.5 %    ABS. NEUTROPHILS 6.5 1.8 - 8.0 K/UL    ABS. LYMPHOCYTES 2.4 0.8 - 3.5 K/UL    ABS. MONOCYTES 0.5 0.0 - 1.0 K/UL    ABS. EOSINOPHILS 0.1 0.0 - 0.4 K/UL    ABS. BASOPHILS 0.1 0.0 - 0.1 K/UL    ABS. IMM.  GRANS. 0.6 (H) 0.00 - 0.04 K/UL    DF AUTOMATED      RBC COMMENTS Target Cells  1+        RBC COMMENTS Anisocytosis  1+        RBC COMMENTS Polychromasia  1+       METABOLIC PANEL, BASIC    Collection Time: 22  1:47 AM   Result Value Ref Range    Sodium 145 136 - 145 mmol/L    Potassium 3.2 (L) 3.5 - 5.1 mmol/L    Chloride 107 97 - 108 mmol/L    CO2 34 (H) 21 - 32 mmol/L    Anion gap 4 (L) 5 - 15 mmol/L    Glucose 103 (H) 65 - 100 mg/dL    BUN 5 (L) 6 - 20 mg/dL    Creatinine 0.97 0.55 - 1.02 mg/dL    BUN/Creatinine ratio 5 (L) 12 - 20      GFR est AA >60 >60 ml/min/1.73m2    GFR est non-AA 59 (L) >60 ml/min/1.73m2    Calcium 8.9 8.5 - 10.1 mg/dL   C REACTIVE PROTEIN, QT    Collection Time: 03/14/22  1:47 AM   Result Value Ref Range    C-Reactive protein 7.57 (H) 0.00 - 0.60 mg/dL   PROCALCITONIN    Collection Time: 03/14/22  1:47 AM   Result Value Ref Range    Procalcitonin 44.49 (H) 0 ng/mL        XR CHEST PORT   Final Result      No consolidation in the lungs. Hiatal hernia. Follow-up as clinically indicated. PHYSICAL EXAM:    Physical Exam  Vitals reviewed. Constitutional:       Appearance: She is not ill-appearing. HENT:      Head: Normocephalic and atraumatic. Mouth/Throat:      Mouth: Mucous membranes are moist.      Pharynx: Oropharynx is clear. Eyes:      Conjunctiva/sclera: Conjunctivae normal.   Cardiovascular:      Rate and Rhythm: Normal rate and regular rhythm. Heart sounds: Normal heart sounds. Pulmonary:      Effort: Pulmonary effort is normal.      Breath sounds: Normal breath sounds. Abdominal:      General: Abdomen is flat. Bowel sounds are normal.      Palpations: Abdomen is soft. Musculoskeletal:         General: Normal range of motion. Cervical back: Normal range of motion and neck supple. Skin:     General: Skin is warm and dry. Neurological:      General: No focal deficit present. Mental Status: She is alert and oriented to person, place, and time. Mental status is at baseline.    Psychiatric:         Mood and Affect: Mood normal.          Data Review    Recent Results (from the past 24 hour(s))   CBC WITH AUTOMATED DIFF    Collection Time: 03/14/22  1:47 AM   Result Value Ref Range    WBC 10.2 3.6 - 11.0 K/uL    RBC 3.64 (L) 3.80 - 5.20 M/uL    HGB 9.4 (L) 11.5 - 16.0 g/dL    HCT 29.6 (L) 35.0 - 47.0 %    MCV 81.3 80.0 - 99.0 FL    MCH 25.8 (L) 26.0 - 34.0 PG    MCHC 31.8 30.0 - 36.5 g/dL    RDW 21.8 (H) 11.5 - 14.5 %    PLATELET 930 (H) 738 - 400 K/uL    MPV 11.9 8.9 - 12.9 FL    NRBC 0.4 (H) 0.0  WBC    ABSOLUTE NRBC 0.04 (H) 0.00 - 0.01 K/uL    NEUTROPHILS 63 32 - 75 % LYMPHOCYTES 24 12 - 49 %    MONOCYTES 5 5 - 13 %    EOSINOPHILS 1 0 - 7 %    BASOPHILS 1 0 - 1 %    IMMATURE GRANULOCYTES 6 (H) 0 - 0.5 %    ABS. NEUTROPHILS 6.5 1.8 - 8.0 K/UL    ABS. LYMPHOCYTES 2.4 0.8 - 3.5 K/UL    ABS. MONOCYTES 0.5 0.0 - 1.0 K/UL    ABS. EOSINOPHILS 0.1 0.0 - 0.4 K/UL    ABS. BASOPHILS 0.1 0.0 - 0.1 K/UL    ABS. IMM. GRANS. 0.6 (H) 0.00 - 0.04 K/UL    DF AUTOMATED      RBC COMMENTS Target Cells  1+        RBC COMMENTS Anisocytosis  1+        RBC COMMENTS Polychromasia  1+       METABOLIC PANEL, BASIC    Collection Time: 03/14/22  1:47 AM   Result Value Ref Range    Sodium 145 136 - 145 mmol/L    Potassium 3.2 (L) 3.5 - 5.1 mmol/L    Chloride 107 97 - 108 mmol/L    CO2 34 (H) 21 - 32 mmol/L    Anion gap 4 (L) 5 - 15 mmol/L    Glucose 103 (H) 65 - 100 mg/dL    BUN 5 (L) 6 - 20 mg/dL    Creatinine 0.97 0.55 - 1.02 mg/dL    BUN/Creatinine ratio 5 (L) 12 - 20      GFR est AA >60 >60 ml/min/1.73m2    GFR est non-AA 59 (L) >60 ml/min/1.73m2    Calcium 8.9 8.5 - 10.1 mg/dL   C REACTIVE PROTEIN, QT    Collection Time: 03/14/22  1:47 AM   Result Value Ref Range    C-Reactive protein 7.57 (H) 0.00 - 0.60 mg/dL   PROCALCITONIN    Collection Time: 03/14/22  1:47 AM   Result Value Ref Range    Procalcitonin 44.49 (H) 0 ng/mL        Assessment/Plan:     Principal Problem:    GI bleed (3/11/2022)      Hospital course:    Is a 72-year-old female admitted on 3/10/2022 with a past medical history of COPD, anemia and depression who developed altered mental status and generalized weakness and chills. Reported vomiting and diarrhea for approximately 4 days prior to evaluation in emergency department. Patient was noted to have black tarry stools and an EGD revealed Esophagitis as well as gastritis without occult bleeding. Colonoscopy performed revealed hemorrhoids, second-degree with diverticulosis as well as a sessile polyp which was biopsied.   Metabolic encephalopathy has resolved and patient is alert and answering questions appropriately. Patient also found to have urinary tract infection with gram-negative rods. Continuing Zosyn. Blood cultures have been negative. Patient with profound LOLITA with metabolic acidosis upon arrival with a bicarbonate of 12. Started on bicarbonate drip with complete resolution. LOLITA has resolved. Impression\plan:    1.  GI bleed  Continue Protonix  EGD with esophagitis and gastritis without bleeding  Stool occult positive  Colonoscopy with a sessile polyp and biopsy with diverticulosis and hemorrhoids  Advance diet to clear liquids    2. Acute blood loss anemia  Resolving  Transfuse for hemoglobin less than 7    3. Altered mental status secondary to metabolic acidosis and encephalopathy  Resolved    4. LOLITA  Appreciate nephrology recommendations  Resolved    5. Sepsis with urinary tract infection  Gram negative rods  Continue IV Zosyn  Pro Ariel trending down  Await sensitivity and specificity of antibiotic    6. Hypokalemia  Continue to replete    CODE STATUS: Full code    DVT prophylaxis: SCDs  GI prophylaxis: Protonix  Discharge disposition: Await urinary tract infection specificities and sensitivities    Discussed case with patient's spouse at the bedside Mr. Deandre Kinney discussed with: Patient/Family    Total time spent with patient: >35 minutes.

## 2022-03-14 NOTE — PROGRESS NOTES
Renal Note    NAME:  Saundra Liu   :   1960   MRN:   [id number]     ATTENDING: Guido Tristan MD  PCP:  None    Date/Time:  3/14/2022 12:36 PM      Subjective:   REQUESTING PHYSICIAN:  REASON FOR CONSULT:    Metabolic acidosis    Patient seen in the room. She seems less confused today. Labs are improving. Potassium low at 3.2. Bicarb is increased to 34. Renal function normal now      Past Medical History:   Diagnosis Date    Anemia     Chronic obstructive pulmonary disease (HCC)     Chronic pain     neck, slipped disc    Ectopic pregnancy     GERD (gastroesophageal reflux disease)     Heart murmur     History of low potassium     Hypokalemia     Ill-defined condition     murmur    Psychiatric disorder     depression      Past Surgical History:   Procedure Laterality Date    HX APPENDECTOMY      HX OTHER SURGICAL      needle removal left arm     Social History     Tobacco Use    Smoking status: Current Every Day Smoker     Packs/day: 0.25    Smokeless tobacco: Never Used   Substance Use Topics    Alcohol use: Never      Family History   Family history unknown: Yes       No Known Allergies   Prior to Admission medications    Medication Sig Start Date End Date Taking? Authorizing Provider   sodium bicarbonate 650 mg tablet Take 2 Tablets by mouth daily. 2/10/22   Provider, Historical   calcium carbonate (TUMS) 200 mg calcium (500 mg) chew Take 2 Tablets by mouth every four (4) hours as needed for Other (reflux). 21   Angel DELGADO MD   ferrous sulfate 325 mg (65 mg iron) tablet Take 1 Tab by mouth Daily (before breakfast). 20   Jeanna Santana MD   omeprazole (PriLOSEC OTC) 20 mg tablet Take 1 Tab by mouth daily. 20   Jeanna Santana MD   PARoxetine (PAXIL) 40 mg tablet Take 1 Tab by mouth daily. 20   Jeanna Santana MD   pregabalin (LYRICA) 150 mg capsule Take 1 Cap by mouth two (2) times a day.  Max Daily Amount: 300 mg. 20   Adrien Bettencourt MD   rOPINIRole (REQUIP) 0.25 mg tablet Take 1 Tab by mouth three (3) times daily. 20   Arti, [id number] Lev White MD       REVIEW OF SYSTEMS:       Unable to obtain as patient is very confused    Objective:   VITALS:    Visit Vitals  BP (!) 145/71 (BP 1 Location: Left upper arm, BP Patient Position: At rest)   Pulse 70   Temp 97.6 °F (36.4 °C)   Resp 16   Ht 5' (1.524 m)   Wt 81.6 kg (180 lb)   LMP  (LMP Unknown) Comment: menopause   SpO2 93%   Breastfeeding No   BMI 35.15 kg/m²     Temp (24hrs), Av °F (36.7 °C), Min:97.6 °F (36.4 °C), Max:98.6 °F (37 °C)      PHYSICAL EXAM:     General: Awake. However confused  Eyes: sclera anicteric  Oral Cavity: Mucous membranes dry  Neck: no JVD  Chest: Fair bilateral air entry. No Wheezing or Rhonchi. No rales. Heart: normal sounds  Abdomen: soft and non tender   : no alberts  Lower Extremities: no edema  Skin: no rash  Neuro: Awake  psychiatric: Unable to assess        LAB DATA REVIEWED:    Recent Results (from the past 24 hour(s))   CBC WITH AUTOMATED DIFF    Collection Time: 22  1:47 AM   Result Value Ref Range    WBC 10.2 3.6 - 11.0 K/uL    RBC 3.64 (L) 3.80 - 5.20 M/uL    HGB 9.4 (L) 11.5 - 16.0 g/dL    HCT 29.6 (L) 35.0 - 47.0 %    MCV 81.3 80.0 - 99.0 FL    MCH 25.8 (L) 26.0 - 34.0 PG    MCHC 31.8 30.0 - 36.5 g/dL    RDW 21.8 (H) 11.5 - 14.5 %    PLATELET 516 (H) 660 - 400 K/uL    MPV 11.9 8.9 - 12.9 FL    NRBC 0.4 (H) 0.0  WBC    ABSOLUTE NRBC 0.04 (H) 0.00 - 0.01 K/uL    NEUTROPHILS 63 32 - 75 %    LYMPHOCYTES 24 12 - 49 %    MONOCYTES 5 5 - 13 %    EOSINOPHILS 1 0 - 7 %    BASOPHILS 1 0 - 1 %    IMMATURE GRANULOCYTES 6 (H) 0 - 0.5 %    ABS. NEUTROPHILS 6.5 1.8 - 8.0 K/UL    ABS. LYMPHOCYTES 2.4 0.8 - 3.5 K/UL    ABS. MONOCYTES 0.5 0.0 - 1.0 K/UL    ABS. EOSINOPHILS 0.1 0.0 - 0.4 K/UL    ABS. BASOPHILS 0.1 0.0 - 0.1 K/UL    ABS. IMM.  GRANS. 0.6 (H) 0.00 - 0.04 K/UL    DF AUTOMATED      RBC COMMENTS Target Cells  1+        RBC COMMENTS Anisocytosis  1+        RBC COMMENTS Polychromasia  1+       METABOLIC PANEL, BASIC    Collection Time: 03/14/22  1:47 AM   Result Value Ref Range    Sodium 145 136 - 145 mmol/L    Potassium 3.2 (L) 3.5 - 5.1 mmol/L    Chloride 107 97 - 108 mmol/L    CO2 34 (H) 21 - 32 mmol/L    Anion gap 4 (L) 5 - 15 mmol/L    Glucose 103 (H) 65 - 100 mg/dL    BUN 5 (L) 6 - 20 mg/dL    Creatinine 0.97 0.55 - 1.02 mg/dL    BUN/Creatinine ratio 5 (L) 12 - 20      GFR est AA >60 >60 ml/min/1.73m2    GFR est non-AA 59 (L) >60 ml/min/1.73m2    Calcium 8.9 8.5 - 10.1 mg/dL   C REACTIVE PROTEIN, QT    Collection Time: 03/14/22  1:47 AM   Result Value Ref Range    C-Reactive protein 7.57 (H) 0.00 - 0.60 mg/dL   PROCALCITONIN    Collection Time: 03/14/22  1:47 AM   Result Value Ref Range    Procalcitonin 44.49 (H) 0 ng/mL       Recommendations/Plan:        1.  Acute kidney injury:  -Probably prerenal azotemia secondary to NSAIDs and presence of dehydration and decreased oral intake  -Creatinine was 1.9 on admission . Improved to 0.9 today  -no history of CKD has multiple LOLITA  -Advised her to not take NSAIDs at home  -Urine is suggestive of UTI  -Will continue IV fluids    2.  Metabolic acidosis;   -gap acidosis + this admission which goes against RTA . New metabolic acidosis 2/2 lolita  -New metabolic acidosis not secondary to NSAIDs as that would cause RTA 4  -Has history of type I RTA, based on work-up outpatient. -CO2 is 33 today. On potassium citrate 10 mEq QID which I will decrease to tid  -off bicarb drip   -arash to monitor CO2 levels and potassium levels     3. Hyponatremia  -Sodium 150-->143 today. From water deficit likely because of decreased oral intake  -Encouraged patient to drink more water. I spoke with the nursing staff also to encourage her to drink more fluids    4. Severe hypokalemia  -Potassium is 3.3.   I will give potassium chloride 40 mEq X1  -Increased potassium citrate as above    5. Altered mentation  -She presented with altered mentation.   Could be from metabolic derangements/UTI  -Ammonia was normal     6.  Osteoarthritis/neck pains: Appears to be chronic   -NSAIDs were recently discontinued but she has restarted them again at home           ________________________________________________________________________  Signed: Lashawn Falcon MD

## 2022-03-14 NOTE — PROGRESS NOTES
CM met with patient at the bedside after reviewing clinical record. Patient states she will not need any assistance from CM at [place]. CM will continue to follow patient's progress and provider recommendations.      [address], [place]

## 2022-03-14 NOTE — ANESTHESIA POSTPROCEDURE EVALUATION
Procedure(s):  COLONOSCOPY. MAC    Anesthesia Post Evaluation      Multimodal analgesia: multimodal analgesia not used between 6 hours prior to anesthesia start to PACU discharge  Patient location during evaluation: bedside (Endoscopy suite)  Patient participation: complete - patient cannot participate  Level of consciousness: sleepy but conscious  Pain score: 0  Pain management: adequate  Airway patency: patent  Anesthetic complications: no  Cardiovascular status: acceptable  Respiratory status: acceptable and nasal cannula  Hydration status: acceptable  Comments: This patient remained on the stretcher. The patient was handed off to the endoscopy nursing team.  All questions regarding pre-, intra-, and postoperative care were answered.   Post anesthesia nausea and vomiting:  none      INITIAL Post-op Vital signs:   Vitals Value Taken Time   /83 03/14/22 0929   Temp     Pulse 83 03/14/22 0929   Resp 14 03/14/22 0929   SpO2 100 % 03/14/22 0929

## 2022-03-15 LAB
ALBUMIN SERPL-MCNC: 2.6 G/DL (ref 3.5–5)
ALBUMIN/GLOB SERPL: 0.6 {RATIO} (ref 1.1–2.2)
ALP SERPL-CCNC: 92 U/L (ref 45–117)
ALT SERPL-CCNC: 31 U/L (ref 12–78)
ANION GAP SERPL CALC-SCNC: 3 MMOL/L (ref 5–15)
AST SERPL W P-5'-P-CCNC: 36 U/L (ref 15–37)
BACTERIA SPEC CULT: ABNORMAL
BASOPHILS # BLD: 0 K/UL (ref 0–0.1)
BASOPHILS NFR BLD: 0 % (ref 0–1)
BILIRUB SERPL-MCNC: 0.3 MG/DL (ref 0.2–1)
BUN SERPL-MCNC: 4 MG/DL (ref 6–20)
BUN/CREAT SERPL: 4 (ref 12–20)
CA-I BLD-MCNC: 8.5 MG/DL (ref 8.5–10.1)
CHLORIDE SERPL-SCNC: 105 MMOL/L (ref 97–108)
CO2 SERPL-SCNC: 33 MMOL/L (ref 21–32)
COLONY COUNT,CNT: ABNORMAL
CREAT SERPL-MCNC: 0.91 MG/DL (ref 0.55–1.02)
DIFFERENTIAL METHOD BLD: ABNORMAL
EOSINOPHIL # BLD: 0 K/UL (ref 0–0.4)
EOSINOPHIL NFR BLD: 0 % (ref 0–7)
ERYTHROCYTE [DISTWIDTH] IN BLOOD BY AUTOMATED COUNT: 22.2 % (ref 11.5–14.5)
GLOBULIN SER CALC-MCNC: 4.1 G/DL (ref 2–4)
GLUCOSE SERPL-MCNC: 93 MG/DL (ref 65–100)
HCT VFR BLD AUTO: 28.5 % (ref 35–47)
HGB BLD-MCNC: 8.8 G/DL (ref 11.5–16)
IMM GRANULOCYTES # BLD AUTO: 0 K/UL
IMM GRANULOCYTES NFR BLD AUTO: 0 %
LYMPHOCYTES # BLD: 1.7 K/UL (ref 0.8–3.5)
LYMPHOCYTES NFR BLD: 20 % (ref 12–49)
MCH RBC QN AUTO: 25.7 PG (ref 26–34)
MCHC RBC AUTO-ENTMCNC: 30.9 G/DL (ref 30–36.5)
MCV RBC AUTO: 83.3 FL (ref 80–99)
MONOCYTES # BLD: 0 K/UL (ref 0–1)
MONOCYTES NFR BLD: 0 % (ref 5–13)
MYELOCYTES NFR BLD MANUAL: 3 %
NEUTS SEG # BLD: 6.2 K/UL (ref 1.8–8)
NEUTS SEG NFR BLD: 75 % (ref 32–75)
NRBC # BLD: 0.02 K/UL (ref 0–0.01)
NRBC BLD-RTO: 0.2 PER 100 WBC
OTHER CELLS NFR BLD MANUAL: 2 %
PLATELET # BLD AUTO: 432 K/UL (ref 150–400)
PLATELET COMMENTS,PCOM: ABNORMAL
PMV BLD AUTO: 12.3 FL (ref 8.9–12.9)
POTASSIUM SERPL-SCNC: 2.9 MMOL/L (ref 3.5–5.1)
PROT SERPL-MCNC: 6.7 G/DL (ref 6.4–8.2)
RBC # BLD AUTO: 3.42 M/UL (ref 3.8–5.2)
RBC MORPH BLD: ABNORMAL
SODIUM SERPL-SCNC: 141 MMOL/L (ref 136–145)
SPECIAL REQUESTS,SREQ: ABNORMAL
WBC # BLD AUTO: 8.3 K/UL (ref 3.6–11)

## 2022-03-15 PROCEDURE — 74011000250 HC RX REV CODE- 250: Performed by: INTERNAL MEDICINE

## 2022-03-15 PROCEDURE — 74011000258 HC RX REV CODE- 258: Performed by: NURSE PRACTITIONER

## 2022-03-15 PROCEDURE — 74011250637 HC RX REV CODE- 250/637: Performed by: INTERNAL MEDICINE

## 2022-03-15 PROCEDURE — 65270000029 HC RM PRIVATE

## 2022-03-15 PROCEDURE — 74011250637 HC RX REV CODE- 250/637: Performed by: PHYSICIAN ASSISTANT

## 2022-03-15 PROCEDURE — 74011250636 HC RX REV CODE- 250/636: Performed by: NURSE PRACTITIONER

## 2022-03-15 PROCEDURE — 36415 COLL VENOUS BLD VENIPUNCTURE: CPT

## 2022-03-15 PROCEDURE — 80053 COMPREHEN METABOLIC PANEL: CPT

## 2022-03-15 PROCEDURE — 85025 COMPLETE CBC W/AUTO DIFF WBC: CPT

## 2022-03-15 RX ORDER — ALBUTEROL SULFATE 90 UG/1
1 AEROSOL, METERED RESPIRATORY (INHALATION)
COMMUNITY

## 2022-03-15 RX ORDER — MELOXICAM 15 MG/1
15 TABLET ORAL DAILY
COMMUNITY
End: 2022-03-16

## 2022-03-15 RX ORDER — LEVOFLOXACIN 500 MG/1
500 TABLET, FILM COATED ORAL EVERY 24 HOURS
Status: DISCONTINUED | OUTPATIENT
Start: 2022-03-15 | End: 2022-03-16 | Stop reason: HOSPADM

## 2022-03-15 RX ORDER — POTASSIUM CHLORIDE 750 MG/1
40 TABLET, EXTENDED RELEASE ORAL 3 TIMES DAILY
COMMUNITY

## 2022-03-15 RX ORDER — ATORVASTATIN CALCIUM 10 MG/1
10 TABLET, FILM COATED ORAL DAILY
COMMUNITY

## 2022-03-15 RX ORDER — POTASSIUM CHLORIDE 20 MEQ/1
40 TABLET, EXTENDED RELEASE ORAL 3 TIMES DAILY
Status: DISCONTINUED | OUTPATIENT
Start: 2022-03-15 | End: 2022-03-16 | Stop reason: HOSPADM

## 2022-03-15 RX ORDER — ERGOCALCIFEROL 1.25 MG/1
50000 CAPSULE ORAL
COMMUNITY

## 2022-03-15 RX ADMIN — ROPINIROLE HYDROCHLORIDE 0.25 MG: 0.25 TABLET, FILM COATED ORAL at 15:07

## 2022-03-15 RX ADMIN — POTASSIUM CITRATE 10 MEQ: 5 TABLET ORAL at 09:54

## 2022-03-15 RX ADMIN — ROPINIROLE HYDROCHLORIDE 0.25 MG: 0.25 TABLET, FILM COATED ORAL at 21:06

## 2022-03-15 RX ADMIN — LEVOFLOXACIN 500 MG: 500 TABLET, FILM COATED ORAL at 15:09

## 2022-03-15 RX ADMIN — POTASSIUM CHLORIDE 40 MEQ: 20 TABLET, EXTENDED RELEASE ORAL at 21:06

## 2022-03-15 RX ADMIN — PREGABALIN 150 MG: 150 CAPSULE ORAL at 09:54

## 2022-03-15 RX ADMIN — ACETAMINOPHEN 650 MG: 325 TABLET ORAL at 09:54

## 2022-03-15 RX ADMIN — PREGABALIN 150 MG: 150 CAPSULE ORAL at 22:28

## 2022-03-15 RX ADMIN — PIPERACILLIN AND TAZOBACTAM 3.38 G: 3; .375 INJECTION, POWDER, LYOPHILIZED, FOR SOLUTION INTRAVENOUS at 12:34

## 2022-03-15 RX ADMIN — ACETAMINOPHEN 650 MG: 325 TABLET ORAL at 14:41

## 2022-03-15 RX ADMIN — PANTOPRAZOLE SODIUM 40 MG: 40 TABLET, DELAYED RELEASE ORAL at 09:54

## 2022-03-15 RX ADMIN — POTASSIUM CHLORIDE 40 MEQ: 20 TABLET, EXTENDED RELEASE ORAL at 09:54

## 2022-03-15 RX ADMIN — PIPERACILLIN AND TAZOBACTAM 3.38 G: 3; .375 INJECTION, POWDER, LYOPHILIZED, FOR SOLUTION INTRAVENOUS at 04:16

## 2022-03-15 RX ADMIN — POTASSIUM CHLORIDE 40 MEQ: 20 TABLET, EXTENDED RELEASE ORAL at 15:07

## 2022-03-15 RX ADMIN — SODIUM CHLORIDE, PRESERVATIVE FREE 10 ML: 5 INJECTION INTRAVENOUS at 15:09

## 2022-03-15 RX ADMIN — PAROXETINE HYDROCHLORIDE 40 MG: 20 TABLET, FILM COATED ORAL at 09:54

## 2022-03-15 RX ADMIN — ROPINIROLE HYDROCHLORIDE 0.25 MG: 0.25 TABLET, FILM COATED ORAL at 09:54

## 2022-03-15 RX ADMIN — ACETAMINOPHEN 650 MG: 325 TABLET ORAL at 21:29

## 2022-03-15 RX ADMIN — SODIUM CHLORIDE, PRESERVATIVE FREE 10 ML: 5 INJECTION INTRAVENOUS at 04:16

## 2022-03-15 NOTE — PROGRESS NOTES
Progress Note    Patient: Chadd Clarke MRN: [id number]  SSN: [id number]    YOB: 1960  Age: 63 y.o. Sex: female      Admit Date: 3/10/2022    LOS: 4 days     Subjective:   GI in consultation for GI bleed-dark stools     3/15: Patient seen in room, awake and alert. She denies abdominal pain, nausea or vomiting. She tolerated her diet. Denies overt bleeding. She reported loose stools. Colonoscopy yesterday showed second degree hemorrhoids, diverticulosis, and a polyp. Pathology showed hyperplastic polyp. HGB today is 8.8.      Colonoscopyy 3/14 - second degree hemorrhoids, diverticulosis, polyp. EGD 3/12: showing  esophagitis   Unspecified chronic gastritis without bleeding   No active bleeding in upper GI tract     History of Present Illness: Marie Mendoza is a 63 y.o. female who is seen in consultation for GI bleed - dark stools.   Patient was admitted to the hospital with complain of altered mental status and generalized weakness. Patient is a poor historian. History gathered from chart documentation. Patient reportedly fell out of bed prior to arrival. Family reported patient has been having intermittent altered mental status over the past few days. She had nausea, vomiting, and diarrhea last week. Reported having dark liquid stools for the past week. She denies use of blood thinners but admits to regular NSAIDs use. She had several admissions with diagnosis of renal tubular acidosis most likely from chronic NSAID use. Denies acid reflux or heartburn.         Objective:     Vitals:    03/15/22 0231 03/15/22 0742 03/15/22 0819 03/15/22 1108   BP: (!) 150/83 (!) 140/79  120/64   Pulse: 67 67  71   Resp: 18 18  18   Temp: 98.2 °F (36.8 °C) 98.1 °F (36.7 °C)  97.7 °F (36.5 °C)   SpO2: 94% 96% 96% 96%   Weight:       Height:            Intake and Output:  Current Shift: No intake/output data recorded. Last three shifts: 03/13 1901 - 03/15 0700  In: 550 [P.O.:350;  I.V.:200]  Out: 300 [Urine:300]    Physical Exam:   Skin:  Extremities and face reveal no rashes. No cutler erythema. HEENT: Sclerae anicteric. Extra-occular muscles are intact. No abnormal pigmentation of the lips. The neck is supple. Cardiovascular: Regular rate and rhythm. Respiratory:  Comfortable breathing with no accessory muscle use. GI:  Abdomen nondistended, soft, and nontender. No enlargement of the liver or spleen. No masses palpable. Rectal:  Deferred  Musculoskeletal: Generalized weakness  Neurological:  Gross memory appears intact. Patient is alert and oriented. Psychiatric:  Mood appears appropriate with judgement intact. Lymphatic:  No visible adenopathy      Lab/Data Review:  Recent Results (from the past 24 hour(s))   CBC WITH AUTOMATED DIFF    Collection Time: 03/15/22  2:27 AM   Result Value Ref Range    WBC 8.3 3.6 - 11.0 K/uL    RBC 3.42 (L) 3.80 - 5.20 M/uL    HGB 8.8 (L) 11.5 - 16.0 g/dL    HCT 28.5 (L) 35.0 - 47.0 %    MCV 83.3 80.0 - 99.0 FL    MCH 25.7 (L) 26.0 - 34.0 PG    MCHC 30.9 30.0 - 36.5 g/dL    RDW 22.2 (H) 11.5 - 14.5 %    PLATELET 048 (H) 696 - 400 K/uL    MPV 12.3 8.9 - 12.9 FL    NRBC 0.2 (H) 0.0  WBC    ABSOLUTE NRBC 0.02 (H) 0.00 - 0.01 K/uL    NEUTROPHILS 75 32 - 75 %    LYMPHOCYTES 20 12 - 49 %    MONOCYTES 0 (L) 5 - 13 %    EOSINOPHILS 0 0 - 7 %    BASOPHILS 0 0 - 1 %    MYELOCYTES 3 (H) 0 %    OTHER CELL 2 %    IMMATURE GRANULOCYTES 0 %    ABS. NEUTROPHILS 6.2 1.8 - 8.0 K/UL    ABS. LYMPHOCYTES 1.7 0.8 - 3.5 K/UL    ABS. MONOCYTES 0.0 0.0 - 1.0 K/UL    ABS. EOSINOPHILS 0.0 0.0 - 0.4 K/UL    ABS. BASOPHILS 0.0 0.0 - 0.1 K/UL    ABS. IMM.  GRANS. 0.0 K/UL    DF Manual      PLATELET COMMENTS Large Platelets      RBC COMMENTS Polychromasia  1+        RBC COMMENTS Hypochromia  1+        RBC COMMENTS Stomatocytes  2+       METABOLIC PANEL, COMPREHENSIVE    Collection Time: 03/15/22  2:27 AM   Result Value Ref Range    Sodium 141 136 - 145 mmol/L    Potassium 2.9 (L) 3.5 - 5.1 mmol/L    Chloride 105 97 - 108 mmol/L    CO2 33 (H) 21 - 32 mmol/L    Anion gap 3 (L) 5 - 15 mmol/L    Glucose 93 65 - 100 mg/dL    BUN 4 (L) 6 - 20 mg/dL    Creatinine 0.91 0.55 - 1.02 mg/dL    BUN/Creatinine ratio 4 (L) 12 - 20      GFR est AA >60 >60 ml/min/1.73m2    GFR est non-AA >60 >60 ml/min/1.73m2    Calcium 8.5 8.5 - 10.1 mg/dL    Bilirubin, total 0.3 0.2 - 1.0 mg/dL    AST (SGOT) 36 15 - 37 U/L    ALT (SGPT) 31 12 - 78 U/L    Alk. phosphatase 92 45 - 117 U/L    Protein, total 6.7 6.4 - 8.2 g/dL    Albumin 2.6 (L) 3.5 - 5.0 g/dL    Globulin 4.1 (H) 2.0 - 4.0 g/dL    A-G Ratio 0.6 (L) 1.1 - 2.2                XR CHEST PORT   Final Result      No consolidation in the lungs. Hiatal hernia. Follow-up as clinically indicated. Assessment:     Principal Problem:    GI bleed (3/11/2022)        Plan:   1. GI bleed - dark stool    -Stool occult positive    -Hgb 8.8. Monitor hgb and transfuse as needed.    -continue PPI    -repeat CBC in am.    -3/14 Colonoscopy second degree hemorrhoids, diverticulosis, polyp.    -3/12 EGD showing esophagitis, chronic gastritis without bleeding. No active bleeding in upper GI tract     -advance diet as tolerated. 2. Leukocytosis     -WBC 8.3     -Urine showing pyuria     -Currently on Zosyn  3. Acute anemia     -most likely d/t GI bleed     -Hgb 8.8. Monitor and transfuse to keep >7.0     -For EGD in am.  4. LOLITA on CKD     -creatinine elevated on admission - 1.93     -creatinine today 0.91     -Nephrology following - Probably prerenal azotemia secondary to NSAIDs and presence of dehydration and decreased oral intake     -continue IVF  5. Altered mental status     -possibly from UTI, Zosyn started     -Normal ammonia 24.     GI is signing off. Follow up as outpatient in 2 weeks. Patient discussed with Dr Grant Ranks and agrees to above impression and plan. Thank you for allowing me to participate in this patients care    Signed By: Brant Dorman.  ROGELIO Renee     March 15, 2022

## 2022-03-15 NOTE — PROGRESS NOTES
Hospitalist Progress Note    Subjective:   Daily Progress Note: 3/15/2022 11:58 AM    Altered mental status has resolved. Patient denies pain although endorses weakness    Current Facility-Administered Medications   Medication Dose Route Frequency    potassium citrate (UROCIT-K5) tablet 10 mEq  10 mEq Oral BID    pantoprazole (PROTONIX) tablet 40 mg  40 mg Oral ACB    calcium carbonate (TUMS) chewable tablet 400 mg [elemental]  400 mg Oral Q4H PRN    PARoxetine (PAXIL) tablet 40 mg  40 mg Oral DAILY    pregabalin (LYRICA) capsule 150 mg  150 mg Oral BID    rOPINIRole (REQUIP) tablet 0.25 mg  0.25 mg Oral TID    sodium chloride (NS) flush 5-40 mL  5-40 mL IntraVENous Q8H    sodium chloride (NS) flush 5-40 mL  5-40 mL IntraVENous PRN    acetaminophen (TYLENOL) tablet 650 mg  650 mg Oral Q6H PRN    Or    acetaminophen (TYLENOL) suppository 650 mg  650 mg Rectal Q6H PRN    polyethylene glycol (MIRALAX) packet 17 g  17 g Oral DAILY PRN    ondansetron (ZOFRAN ODT) tablet 4 mg  4 mg Oral Q8H PRN    Or    ondansetron (ZOFRAN) injection 4 mg  4 mg IntraVENous Q6H PRN    piperacillin-tazobactam (ZOSYN) 3.375 g in 0.9% sodium chloride (MBP/ADV) 100 mL MBP  3.375 g IntraVENous Q8H    benzocaine-menthoL (CEPACOL) lozenge 1 Lozenge  1 Lozenge Mucous Membrane PRN    0.9% sodium chloride infusion 250 mL  250 mL IntraVENous PRN        Review of Systems  Review of Systems   Constitutional: Positive for malaise/fatigue. Negative for chills and fever. HENT: Negative. Respiratory: Negative for cough and shortness of breath. Cardiovascular: Positive for chest pain. Gastrointestinal: Positive for blood in stool. Negative for abdominal pain, diarrhea, nausea and vomiting. Genitourinary: Negative. Musculoskeletal: Negative. Skin: Negative. Neurological: Negative. Psychiatric/Behavioral: Negative.              Objective:     Visit Vitals  BP (!) 140/79 (BP 1 Location: Right upper arm, BP Patient Position: At rest)   Pulse 67   Temp 98.1 °F (36.7 °C)   Resp 18   Ht 5' (1.524 m)   Wt 81.6 kg (180 lb)   LMP  (LMP Unknown) Comment: menopause   SpO2 96%   Breastfeeding No   BMI 35.15 kg/m²      O2 Device: None (Room air)    Temp (24hrs), Av.2 °F (36.8 °C), Min:97.6 °F (36.4 °C), Max:98.6 °F (37 °C)      No intake/output data recorded.  1901 - 03/15 0700  In: 550 [P.O.:350; I.V.:200]  Out: 300 [Urine:300]    Recent Results (from the past 24 hour(s))   HGB & HCT    Collection Time: 22 12:47 PM   Result Value Ref Range    HGB 9.4 (L) 11.5 - 16.0 g/dL    HCT 30.1 (L) 35.0 - 47.0 %   CBC WITH AUTOMATED DIFF    Collection Time: 03/15/22  2:27 AM   Result Value Ref Range    WBC 8.3 3.6 - 11.0 K/uL    RBC 3.42 (L) 3.80 - 5.20 M/uL    HGB 8.8 (L) 11.5 - 16.0 g/dL    HCT 28.5 (L) 35.0 - 47.0 %    MCV 83.3 80.0 - 99.0 FL    MCH 25.7 (L) 26.0 - 34.0 PG    MCHC 30.9 30.0 - 36.5 g/dL    RDW 22.2 (H) 11.5 - 14.5 %    PLATELET 940 (H) 267 - 400 K/uL    MPV 12.3 8.9 - 12.9 FL    NRBC 0.2 (H) 0.0  WBC    ABSOLUTE NRBC 0.02 (H) 0.00 - 0.01 K/uL    NEUTROPHILS 75 32 - 75 %    LYMPHOCYTES 20 12 - 49 %    MONOCYTES 0 (L) 5 - 13 %    EOSINOPHILS 0 0 - 7 %    BASOPHILS 0 0 - 1 %    MYELOCYTES 3 (H) 0 %    OTHER CELL 2 %    IMMATURE GRANULOCYTES 0 %    ABS. NEUTROPHILS 6.2 1.8 - 8.0 K/UL    ABS. LYMPHOCYTES 1.7 0.8 - 3.5 K/UL    ABS. MONOCYTES 0.0 0.0 - 1.0 K/UL    ABS. EOSINOPHILS 0.0 0.0 - 0.4 K/UL    ABS. BASOPHILS 0.0 0.0 - 0.1 K/UL    ABS. IMM.  GRANS. 0.0 K/UL    DF Manual      PLATELET COMMENTS Large Platelets      RBC COMMENTS Polychromasia  1+        RBC COMMENTS Hypochromia  1+        RBC COMMENTS Stomatocytes  2+       METABOLIC PANEL, COMPREHENSIVE    Collection Time: 03/15/22  2:27 AM   Result Value Ref Range    Sodium 141 136 - 145 mmol/L    Potassium 2.9 (L) 3.5 - 5.1 mmol/L    Chloride 105 97 - 108 mmol/L    CO2 33 (H) 21 - 32 mmol/L    Anion gap 3 (L) 5 - 15 mmol/L    Glucose 93 65 - 100 mg/dL BUN 4 (L) 6 - 20 mg/dL    Creatinine 0.91 0.55 - 1.02 mg/dL    BUN/Creatinine ratio 4 (L) 12 - 20      GFR est AA >60 >60 ml/min/1.73m2    GFR est non-AA >60 >60 ml/min/1.73m2    Calcium 8.5 8.5 - 10.1 mg/dL    Bilirubin, total 0.3 0.2 - 1.0 mg/dL    AST (SGOT) 36 15 - 37 U/L    ALT (SGPT) 31 12 - 78 U/L    Alk. phosphatase 92 45 - 117 U/L    Protein, total 6.7 6.4 - 8.2 g/dL    Albumin 2.6 (L) 3.5 - 5.0 g/dL    Globulin 4.1 (H) 2.0 - 4.0 g/dL    A-G Ratio 0.6 (L) 1.1 - 2.2          XR CHEST PORT   Final Result      No consolidation in the lungs. Hiatal hernia. Follow-up as clinically indicated. PHYSICAL EXAM:    Physical Exam  Vitals reviewed. Constitutional:       Appearance: She is not ill-appearing. HENT:      Head: Normocephalic and atraumatic. Mouth/Throat:      Mouth: Mucous membranes are moist.      Pharynx: Oropharynx is clear. Eyes:      Conjunctiva/sclera: Conjunctivae normal.   Cardiovascular:      Rate and Rhythm: Normal rate and regular rhythm. Heart sounds: Normal heart sounds. Pulmonary:      Effort: Pulmonary effort is normal.      Breath sounds: Normal breath sounds. Abdominal:      General: Abdomen is flat. Bowel sounds are normal.      Palpations: Abdomen is soft. Musculoskeletal:         General: Normal range of motion. Cervical back: Normal range of motion and neck supple. Skin:     General: Skin is warm and dry. Neurological:      General: No focal deficit present. Mental Status: She is alert and oriented to person, place, and time. Mental status is at baseline.    Psychiatric:         Mood and Affect: Mood normal.          Data Review    Recent Results (from the past 24 hour(s))   HGB & HCT    Collection Time: 03/14/22 12:47 PM   Result Value Ref Range    HGB 9.4 (L) 11.5 - 16.0 g/dL    HCT 30.1 (L) 35.0 - 47.0 %   CBC WITH AUTOMATED DIFF    Collection Time: 03/15/22  2:27 AM   Result Value Ref Range    WBC 8.3 3.6 - 11.0 K/uL    RBC 3.42 (L) 3.80 - 5.20 M/uL    HGB 8.8 (L) 11.5 - 16.0 g/dL    HCT 28.5 (L) 35.0 - 47.0 %    MCV 83.3 80.0 - 99.0 FL    MCH 25.7 (L) 26.0 - 34.0 PG    MCHC 30.9 30.0 - 36.5 g/dL    RDW 22.2 (H) 11.5 - 14.5 %    PLATELET 368 (H) 643 - 400 K/uL    MPV 12.3 8.9 - 12.9 FL    NRBC 0.2 (H) 0.0  WBC    ABSOLUTE NRBC 0.02 (H) 0.00 - 0.01 K/uL    NEUTROPHILS 75 32 - 75 %    LYMPHOCYTES 20 12 - 49 %    MONOCYTES 0 (L) 5 - 13 %    EOSINOPHILS 0 0 - 7 %    BASOPHILS 0 0 - 1 %    MYELOCYTES 3 (H) 0 %    OTHER CELL 2 %    IMMATURE GRANULOCYTES 0 %    ABS. NEUTROPHILS 6.2 1.8 - 8.0 K/UL    ABS. LYMPHOCYTES 1.7 0.8 - 3.5 K/UL    ABS. MONOCYTES 0.0 0.0 - 1.0 K/UL    ABS. EOSINOPHILS 0.0 0.0 - 0.4 K/UL    ABS. BASOPHILS 0.0 0.0 - 0.1 K/UL    ABS. IMM. GRANS. 0.0 K/UL    DF Manual      PLATELET COMMENTS Large Platelets      RBC COMMENTS Polychromasia  1+        RBC COMMENTS Hypochromia  1+        RBC COMMENTS Stomatocytes  2+       METABOLIC PANEL, COMPREHENSIVE    Collection Time: 03/15/22  2:27 AM   Result Value Ref Range    Sodium 141 136 - 145 mmol/L    Potassium 2.9 (L) 3.5 - 5.1 mmol/L    Chloride 105 97 - 108 mmol/L    CO2 33 (H) 21 - 32 mmol/L    Anion gap 3 (L) 5 - 15 mmol/L    Glucose 93 65 - 100 mg/dL    BUN 4 (L) 6 - 20 mg/dL    Creatinine 0.91 0.55 - 1.02 mg/dL    BUN/Creatinine ratio 4 (L) 12 - 20      GFR est AA >60 >60 ml/min/1.73m2    GFR est non-AA >60 >60 ml/min/1.73m2    Calcium 8.5 8.5 - 10.1 mg/dL    Bilirubin, total 0.3 0.2 - 1.0 mg/dL    AST (SGOT) 36 15 - 37 U/L    ALT (SGPT) 31 12 - 78 U/L    Alk.  phosphatase 92 45 - 117 U/L    Protein, total 6.7 6.4 - 8.2 g/dL    Albumin 2.6 (L) 3.5 - 5.0 g/dL    Globulin 4.1 (H) 2.0 - 4.0 g/dL    A-G Ratio 0.6 (L) 1.1 - 2.2          Assessment/Plan:     Principal Problem:    GI bleed (3/11/2022)      Hospital course:    Is a 57-year-old female admitted on 3/10/2022 with a past medical history of COPD, anemia and depression who developed altered mental status and generalized weakness and chills. Reported vomiting and diarrhea for approximately 4 days prior to evaluation in emergency department. Patient was noted to have black tarry stools and an EGD revealed Esophagitis as well as gastritis without occult bleeding. Colonoscopy performed revealed hemorrhoids, second-degree with diverticulosis as well as a sessile polyp which was biopsied. Metabolic encephalopathy has resolved and patient is alert and answering questions appropriately. Patient also found to have urinary tract infection with gram-negative rods. Continuing Zosyn. Blood cultures have been negative. Patient with profound LOLITA with metabolic acidosis upon arrival with a bicarbonate of 12. Started on bicarbonate drip with complete resolution. LOLITA has resolved. Impression\plan:    1.  GI bleed  Continue Protonix  EGD with esophagitis and gastritis without bleeding  Stool occult positive  Colonoscopy with a sessile polyp and biopsy with diverticulosis and hemorrhoids  Regular diet    2. Acute blood loss anemia  Resolving  Transfuse for hemoglobin less than 7    3. Altered mental status secondary to metabolic acidosis and encephalopathy  Resolved    4. LOLITA  Appreciate nephrology recommendations  Resolved    5. Sepsis with urinary tract infection  Gram negative rods  Continue IV Zosyn  Pro Ariel trending down  Await sensitivity and specificity of antibiotic    6. Hypokalemia  Continue to replete    CODE STATUS: Full code    DVT prophylaxis: SCDs  GI prophylaxis: Protonix  Discharge disposition: Await urinary tract infection specificities and sensitivities    Discussed case with patient's spouse at the bedside Mr. Deandre Kinney discussed with: Patient/Family    Total time spent with patient: >35 minutes.

## 2022-03-15 NOTE — PROGRESS NOTES
Problem: Falls - Risk of  Goal: *Absence of Falls  Description: Document Darcy Litter Fall Risk and appropriate interventions in the flowsheet.   Outcome: Progressing Towards Goal  Note: Fall Risk Interventions:  Mobility Interventions: Bed/chair exit alarm    Mentation Interventions: Bed/chair exit alarm    Medication Interventions: Bed/chair exit alarm    Elimination Interventions: Bed/chair exit alarm    History of Falls Interventions: Bed/chair exit alarm

## 2022-03-15 NOTE — PROGRESS NOTES
Renal Note    NAME:  Landry Oropeza   :   1960   MRN:   [id number]     ATTENDING: Mary White MD  PCP:  None    Date/Time:  3/15/2022 12:36 PM      Subjective:   REQUESTING PHYSICIAN:  REASON FOR CONSULT:    Metabolic acidosis    Patient seen in the room. She seems less confused today. Labs are improving. Potassium low at 2.9. Bicarb is increased to 33. Renal function normal now      Past Medical History:   Diagnosis Date    Anemia     Chronic obstructive pulmonary disease (HCC)     Chronic pain     neck, slipped disc    Ectopic pregnancy     GERD (gastroesophageal reflux disease)     Heart murmur     History of low potassium     Hypokalemia     Ill-defined condition     murmur    Psychiatric disorder     depression      Past Surgical History:   Procedure Laterality Date    COLONOSCOPY N/A 3/14/2022    COL[facility] HX APPENDECTOMY      HX OTHER SURGICAL      needle removal left arm     Social History     Tobacco Use    Smoking status: Current Every Day Smoker     Packs/day: 0.25    Smokeless tobacco: Never Used   Substance Use Topics    Alcohol use: Never      Family History   Family history unknown: Yes       No Known Allergies   Prior to Admission medications    Medication Sig Start Date End Date Taking? Authorizing Provider   albuterol (PROVENTIL HFA, VENTOLIN HFA, PROAIR HFA) 90 mcg/actuation inhaler Take 1 Puff by inhalation every six (6) hours as needed for Wheezing. Yes Provider, Historical   potassium chloride (KLOR-CON M10) 10 mEq tablet Take 40 mEq by mouth three (3) times daily. Yes Provider, Historical   ergocalciferol (Vitamin D2) 1,250 mcg (50,000 unit) capsule Take 50,000 Units by mouth every seven (7) days. Yes Provider, Historical   meloxicam (MOBIC) 15 mg tablet Take 15 mg by mouth daily. Yes Provider, Historical   atorvastatin (Lipitor) 10 mg tablet Take 10 mg by mouth daily.    Yes Provider, patient stated Historical   sodium bicarbonate 650 mg tablet Take 2 Tablets by mouth daily. 2/10/22  Yes Provider, Historical   calcium carbonate (TUMS) 200 mg calcium (500 mg) chew Take 2 Tablets by mouth every four (4) hours as needed for Other (reflux). 21  Yes Víctor Garces MD   ferrous sulfate 325 mg (65 mg iron) tablet Take 1 Tab by mouth Daily (before breakfast). 20  Yes Aruna Chandra MD   omeprazole (PriLOSEC OTC) 20 mg tablet Take 1 Tab by mouth daily. Patient taking differently: Take 40 mg by mouth daily. 20  Yes Aruna Chandra MD   PARoxetine (PAXIL) 40 mg tablet Take 1 Tab by mouth daily. 20  Yes Aruna Chandra MD   pregabalin (LYRICA) 150 mg capsule Take 1 Cap by mouth two (2) times a day. Max Daily Amount: 300 mg. Patient taking differently: Take 225 mg by mouth two (2) times a day. 20  Yes Aruna Chandra MD   rOPINIRole (REQUIP) 0.25 mg tablet Take 1 Tab by mouth three (3) times daily. 20  Yes Aruna Chandra MD       REVIEW OF SYSTEMS:       Unable to obtain as patient is very confused    Objective:   VITALS:    Visit Vitals  /64 (BP 1 Location: Right upper arm, BP Patient Position: At rest)   Pulse 71   Temp 97.7 °F (36.5 °C)   Resp 18   Ht 5' (1.524 m)   Wt 81.6 kg (180 lb)   LMP  (LMP Unknown) Comment: menopause   SpO2 96%   Breastfeeding No   BMI 35.15 kg/m²     Temp (24hrs), Av.1 °F (36.7 °C), Min:97.7 °F (36.5 °C), Max:98.3 °F (36.8 °C)      PHYSICAL EXAM:     General: Awake. However confused  Eyes: sclera anicteric  Oral Cavity: Mucous membranes dry  Neck: no JVD  Chest: Fair bilateral air entry. No Wheezing or Rhonchi. No rales.   Heart: normal sounds  Abdomen: soft and non tender   : no alberts  Lower Extremities: no edema  Skin: no rash  Neuro: Awake  psychiatric: Unable to assess        LAB DATA REVIEWED:    Recent Results (from the past 24 hour(s))   HGB & HCT    Collection Time: 22 12:47 PM   Result Value Ref Range    HGB 9.4 (L) 11.5 - 16.0 g/dL    HCT 30.1 (L) 35.0 - 47.0 %   CBC WITH AUTOMATED DIFF    Collection Time: 03/15/22  2:27 AM   Result Value Ref Range    WBC 8.3 3.6 - 11.0 K/uL    RBC 3.42 (L) 3.80 - 5.20 M/uL    HGB 8.8 (L) 11.5 - 16.0 g/dL    HCT 28.5 (L) 35.0 - 47.0 %    MCV 83.3 80.0 - 99.0 FL    MCH 25.7 (L) 26.0 - 34.0 PG    MCHC 30.9 30.0 - 36.5 g/dL    RDW 22.2 (H) 11.5 - 14.5 %    PLATELET 723 (H) 053 - 400 K/uL    MPV 12.3 8.9 - 12.9 FL    NRBC 0.2 (H) 0.0  WBC    ABSOLUTE NRBC 0.02 (H) 0.00 - 0.01 K/uL    NEUTROPHILS 75 32 - 75 %    LYMPHOCYTES 20 12 - 49 %    MONOCYTES 0 (L) 5 - 13 %    EOSINOPHILS 0 0 - 7 %    BASOPHILS 0 0 - 1 %    MYELOCYTES 3 (H) 0 %    OTHER CELL 2 %    IMMATURE GRANULOCYTES 0 %    ABS. NEUTROPHILS 6.2 1.8 - 8.0 K/UL    ABS. LYMPHOCYTES 1.7 0.8 - 3.5 K/UL    ABS. MONOCYTES 0.0 0.0 - 1.0 K/UL    ABS. EOSINOPHILS 0.0 0.0 - 0.4 K/UL    ABS. BASOPHILS 0.0 0.0 - 0.1 K/UL    ABS. IMM. GRANS. 0.0 K/UL    DF Manual      PLATELET COMMENTS Large Platelets      RBC COMMENTS Polychromasia  1+        RBC COMMENTS Hypochromia  1+        RBC COMMENTS Stomatocytes  2+       METABOLIC PANEL, COMPREHENSIVE    Collection Time: 03/15/22  2:27 AM   Result Value Ref Range    Sodium 141 136 - 145 mmol/L    Potassium 2.9 (L) 3.5 - 5.1 mmol/L    Chloride 105 97 - 108 mmol/L    CO2 33 (H) 21 - 32 mmol/L    Anion gap 3 (L) 5 - 15 mmol/L    Glucose 93 65 - 100 mg/dL    BUN 4 (L) 6 - 20 mg/dL    Creatinine 0.91 0.55 - 1.02 mg/dL    BUN/Creatinine ratio 4 (L) 12 - 20      GFR est AA >60 >60 ml/min/1.73m2    GFR est non-AA >60 >60 ml/min/1.73m2    Calcium 8.5 8.5 - 10.1 mg/dL    Bilirubin, total 0.3 0.2 - 1.0 mg/dL    AST (SGOT) 36 15 - 37 U/L    ALT (SGPT) 31 12 - 78 U/L    Alk.  phosphatase 92 45 - 117 U/L    Protein, total 6.7 6.4 - 8.2 g/dL    Albumin 2.6 (L) 3.5 - 5.0 g/dL    Globulin 4.1 (H) 2.0 - 4.0 g/dL    A-G Ratio 0.6 (L) 1.1 - 2.2         Recommendations/Plan: 1.  Acute kidney injury:  -Probably prerenal azotemia secondary to NSAIDs and presence of dehydration and decreased oral intake  -Creatinine was 1.9 on admission . Improved to 0.9 today  -no history of CKD has multiple LOLITA  -Advised her to not take NSAIDs at home  -Urine is suggestive of UTI  -Will continue IV fluids    2.  Metabolic acidosis;   -gap acidosis + this admission which goes against RTA . New metabolic acidosis 2/2 lolita  -New metabolic acidosis not secondary to NSAIDs as that would cause RTA 4  -Has history of type I RTA, based on work-up outpatient. -CO2 is 33 today. On potassium citrate 10 mEq QID which I will discontinue  -off bicarb drip   -manjulainue to monitor CO2 levels and potassium levels     3. Hyponatremia  -Sodium 150-->143 today. From water deficit likely because of decreased oral intake  -Encouraged patient to drink more water. I spoke with the nursing staff also to encourage her to drink more fluids    4. Severe hypokalemia  -Potassium is 2.9. I will give potassium chloride 40 mEq X3  -We will check mag level    5. Altered mentation  -She presented with altered mentation.   Now resolved   -could be from metabolic derangements/UTI  -Ammonia was normal     6.  Osteoarthritis/neck pains: Appears to be chronic   -NSAIDs were recently discontinued but she has restarted them again at home           ________________________________________________________________________  Signed: Andrés Austin MD

## 2022-03-15 NOTE — PROGRESS NOTES
Spiritual Care Assessment/Progress Note  Critical access hospital      NAME: Jocelin Arias      MRN: [id number]  AGE: 63 y.o.  SEX: female  Adventism Affiliation: No preference   Language: English     3/15/2022     Total Time (in minutes): 14     Spiritual Assessment begun in [facility] SURGICAL through conversation with:         [x]Patient        [] Family    [] Friend(s)        Reason for Consult: Initial/Spiritual assessment, patient floor     Spiritual beliefs: (Please include comment if needed)     [x] Identifies with a cortes tradition:  Zora      [] Supported by a cortes community:            [] Claims no spiritual orientation:           [] Seeking spiritual identity:                [] Adheres to an individual form of spirituality:           [] Not able to assess:                           Identified resources for coping:      [x] Prayer                               [] Music                  [] Guided Imagery     [x] Family/friends                 [x] Pet visits     [] Devotional reading                         [] Unknown     [] Other:                                               Interventions offered during this visit: (See comments for more details)    Patient Interventions: Affirmation of emotions/emotional suffering,Bridging,Catharsis/review of pertinent events in supportive environment,Coping skills reviewed/reinforced,Initial/Spiritual assessment, patient floor,Life review/legacy,Normalization of emotional/spiritual concerns,Prayer (actual),Adventism beliefs/image of God discussed           Plan of Care:     [] Support spiritual and/or cultural needs    [] Support AMD and/or advance care planning process      [] Support grieving process   [] Coordinate Rites and/or Rituals    [] Coordination with community clergy   [] No spiritual needs identified at this time   [] Detailed Plan of Care below (See Comments)  [] Make referral to Music Therapy  [] Make referral to Pet Therapy     [] Make referral to Addiction services  [] Make referral to [facility]  [] Make referral to Spiritual Care Partner  [] No future visits requested        [x] Contact Spiritual Care for further referrals     Comments:  Visited patient in [address] [place] [address] for initial assessment. Patient was alone during the visit. Patient stated she is doing well and affirmed good support from family and Episcopalian. Stated she looks forward to going home to be with her cats. Provided chaplaincy education and listened with empathy and reflection while exploring her needs and resources. Affirmed her health care needs, relational support and spiritual resources. Offered and provided prayer per patient's request.  Advised of  availability. Contact chaplains for further referrals. Chaplain Anjel Pulido M.Div.    can be reached by calling the  at [facility]  [phone]

## 2022-03-15 NOTE — PROGRESS NOTES
Problem: Falls - Risk of  Goal: *Absence of Falls  Description: Document Jasmine Baldos Fall Risk and appropriate interventions in the flowsheet. Outcome: Progressing Towards Goal  Note: Fall Risk Interventions:  Mobility Interventions: Bed/chair exit alarm    Mentation Interventions: Bed/chair exit alarm    Medication Interventions: Bed/chair exit alarm    Elimination Interventions: Bed/chair exit alarm,Call light in reach,Patient to call for help with toileting needs    History of Falls Interventions: Bed/chair exit alarm         Problem: Patient Education: Go to Patient Education Activity  Goal: Patient/Family Education  Outcome: Progressing Towards Goal     Problem: Pressure Injury - Risk of  Goal: *Prevention of pressure injury  Description: Document David Scale and appropriate interventions in the flowsheet.   Outcome: Progressing Towards Goal  Note: Pressure Injury Interventions:       Moisture Interventions: Absorbent underpads    Activity Interventions: Increase time out of bed    Mobility Interventions: HOB 30 degrees or less    Nutrition Interventions: Document food/fluid/supplement intake    Friction and Shear Interventions: Minimize layers                Problem: Patient Education: Go to Patient Education Activity  Goal: Patient/Family Education  Outcome: Progressing Towards Goal     Problem: Patient Education: Go to Patient Education Activity  Goal: Patient/Family Education  Outcome: Progressing Towards Goal

## 2022-03-16 VITALS
SYSTOLIC BLOOD PRESSURE: 140 MMHG | TEMPERATURE: 98.3 F | HEART RATE: 74 BPM | HEIGHT: 60 IN | DIASTOLIC BLOOD PRESSURE: 79 MMHG | RESPIRATION RATE: 20 BRPM | WEIGHT: 180 LBS | OXYGEN SATURATION: 97 % | BODY MASS INDEX: 35.34 KG/M2

## 2022-03-16 PROBLEM — G93.41 METABOLIC ENCEPHALOPATHY: Status: ACTIVE | Noted: 2022-03-16

## 2022-03-16 PROBLEM — D62 ACUTE BLOOD LOSS ANEMIA: Status: ACTIVE | Noted: 2022-03-16

## 2022-03-16 PROBLEM — N17.9 AKI (ACUTE KIDNEY INJURY) (HCC): Status: ACTIVE | Noted: 2022-03-16

## 2022-03-16 PROBLEM — E87.20 METABOLIC ACIDOSIS: Status: ACTIVE | Noted: 2022-03-16

## 2022-03-16 PROBLEM — N39.0 ACUTE URINARY TRACT INFECTION: Status: ACTIVE | Noted: 2022-03-16

## 2022-03-16 LAB
ALBUMIN SERPL-MCNC: 2.8 G/DL (ref 3.5–5)
ALBUMIN/GLOB SERPL: 0.6 {RATIO} (ref 1.1–2.2)
ALP SERPL-CCNC: 88 U/L (ref 45–117)
ALT SERPL-CCNC: 37 U/L (ref 12–78)
ANION GAP SERPL CALC-SCNC: 4 MMOL/L (ref 5–15)
AST SERPL W P-5'-P-CCNC: 28 U/L (ref 15–37)
BASOPHILS # BLD: 0 K/UL (ref 0–0.1)
BASOPHILS NFR BLD: 0 % (ref 0–1)
BILIRUB SERPL-MCNC: 0.3 MG/DL (ref 0.2–1)
BUN SERPL-MCNC: 6 MG/DL (ref 6–20)
BUN/CREAT SERPL: 6 (ref 12–20)
CA-I BLD-MCNC: 8.9 MG/DL (ref 8.5–10.1)
CHLORIDE SERPL-SCNC: 107 MMOL/L (ref 97–108)
CO2 SERPL-SCNC: 30 MMOL/L (ref 21–32)
CREAT SERPL-MCNC: 0.98 MG/DL (ref 0.55–1.02)
DIFFERENTIAL METHOD BLD: ABNORMAL
EOSINOPHIL # BLD: 0.1 K/UL (ref 0–0.4)
EOSINOPHIL NFR BLD: 1 % (ref 0–7)
ERYTHROCYTE [DISTWIDTH] IN BLOOD BY AUTOMATED COUNT: 22.6 % (ref 11.5–14.5)
GLOBULIN SER CALC-MCNC: 4.7 G/DL (ref 2–4)
GLUCOSE SERPL-MCNC: 93 MG/DL (ref 65–100)
HCT VFR BLD AUTO: 31.6 % (ref 35–47)
HGB BLD-MCNC: 9.3 G/DL (ref 11.5–16)
IMM GRANULOCYTES # BLD AUTO: 0.3 K/UL (ref 0–0.04)
IMM GRANULOCYTES NFR BLD AUTO: 4 % (ref 0–0.5)
LYMPHOCYTES # BLD: 2 K/UL (ref 0.8–3.5)
LYMPHOCYTES NFR BLD: 24 % (ref 12–49)
MAGNESIUM SERPL-MCNC: 1.9 MG/DL (ref 1.6–2.4)
MCH RBC QN AUTO: 25.4 PG (ref 26–34)
MCHC RBC AUTO-ENTMCNC: 29.4 G/DL (ref 30–36.5)
MCV RBC AUTO: 86.3 FL (ref 80–99)
MONOCYTES # BLD: 0.6 K/UL (ref 0–1)
MONOCYTES NFR BLD: 7 % (ref 5–13)
NEUTS SEG # BLD: 5.5 K/UL (ref 1.8–8)
NEUTS SEG NFR BLD: 64 % (ref 32–75)
NRBC # BLD: 0 K/UL (ref 0–0.01)
NRBC BLD-RTO: 0 PER 100 WBC
PLATELET # BLD AUTO: 459 K/UL (ref 150–400)
PMV BLD AUTO: 12.6 FL (ref 8.9–12.9)
POTASSIUM SERPL-SCNC: 3.6 MMOL/L (ref 3.5–5.1)
PROT SERPL-MCNC: 7.5 G/DL (ref 6.4–8.2)
RBC # BLD AUTO: 3.66 M/UL (ref 3.8–5.2)
SODIUM SERPL-SCNC: 141 MMOL/L (ref 136–145)
WBC # BLD AUTO: 8.5 K/UL (ref 3.6–11)

## 2022-03-16 PROCEDURE — 74011250637 HC RX REV CODE- 250/637: Performed by: INTERNAL MEDICINE

## 2022-03-16 PROCEDURE — 83735 ASSAY OF MAGNESIUM: CPT

## 2022-03-16 PROCEDURE — 74011250637 HC RX REV CODE- 250/637: Performed by: PHYSICIAN ASSISTANT

## 2022-03-16 PROCEDURE — 85025 COMPLETE CBC W/AUTO DIFF WBC: CPT

## 2022-03-16 PROCEDURE — 36415 COLL VENOUS BLD VENIPUNCTURE: CPT

## 2022-03-16 PROCEDURE — 80053 COMPREHEN METABOLIC PANEL: CPT

## 2022-03-16 RX ORDER — SODIUM CHLORIDE 0.9 % (FLUSH) 0.9 %
5-40 SYRINGE (ML) INJECTION EVERY 8 HOURS
Status: DISCONTINUED | OUTPATIENT
Start: 2022-03-16 | End: 2022-03-16

## 2022-03-16 RX ORDER — SODIUM CHLORIDE 0.9 % (FLUSH) 0.9 %
5-40 SYRINGE (ML) INJECTION AS NEEDED
Status: DISCONTINUED | OUTPATIENT
Start: 2022-03-16 | End: 2022-03-16 | Stop reason: HOSPADM

## 2022-03-16 RX ORDER — TRAMADOL HYDROCHLORIDE 50 MG/1
50 TABLET ORAL
Qty: 12 TABLET | Refills: 0 | Status: SHIPPED | OUTPATIENT
Start: 2022-03-16 | End: 2022-03-19

## 2022-03-16 RX ORDER — SODIUM CHLORIDE 9 MG/ML
75 INJECTION, SOLUTION INTRAVENOUS CONTINUOUS
Status: DISCONTINUED | OUTPATIENT
Start: 2022-03-16 | End: 2022-03-16 | Stop reason: HOSPADM

## 2022-03-16 RX ORDER — OMEPRAZOLE 20 MG/1
40 TABLET, DELAYED RELEASE ORAL DAILY
Qty: 30 TABLET | Refills: 0 | Status: SHIPPED | OUTPATIENT
Start: 2022-03-16

## 2022-03-16 RX ORDER — LEVOFLOXACIN 500 MG/1
500 TABLET, FILM COATED ORAL EVERY 24 HOURS
Qty: 7 TABLET | Refills: 0 | Status: SHIPPED | OUTPATIENT
Start: 2022-03-16 | End: 2022-03-23

## 2022-03-16 RX ORDER — PREGABALIN 225 MG/1
225 CAPSULE ORAL 2 TIMES DAILY
Qty: 30 CAPSULE | Refills: 0 | Status: SHIPPED | OUTPATIENT
Start: 2022-03-16

## 2022-03-16 RX ADMIN — PANTOPRAZOLE SODIUM 40 MG: 40 TABLET, DELAYED RELEASE ORAL at 06:40

## 2022-03-16 RX ADMIN — PAROXETINE HYDROCHLORIDE 40 MG: 20 TABLET, FILM COATED ORAL at 09:11

## 2022-03-16 RX ADMIN — PREGABALIN 150 MG: 150 CAPSULE ORAL at 09:11

## 2022-03-16 RX ADMIN — POTASSIUM CHLORIDE 40 MEQ: 20 TABLET, EXTENDED RELEASE ORAL at 09:11

## 2022-03-16 RX ADMIN — ROPINIROLE HYDROCHLORIDE 0.25 MG: 0.25 TABLET, FILM COATED ORAL at 09:11

## 2022-03-16 NOTE — PROGRESS NOTES
Problem: Falls - Risk of  Goal: *Absence of Falls  Description: Document Sue Don Fall Risk and appropriate interventions in the flowsheet.   Outcome: Progressing Towards Goal  Note: Fall Risk Interventions:  Mobility Interventions: Bed/chair exit alarm    Mentation Interventions: Bed/chair exit alarm    Medication Interventions: Bed/chair exit alarm,Patient to call before getting OOB    Elimination Interventions: Bed/chair exit alarm    History of Falls Interventions: Bed/chair exit alarm

## 2022-03-16 NOTE — PROGRESS NOTES
0800: Chart reviewed. When medically stable patient to discharge home self-care with her spouse, Laura Becerra.

## 2022-03-16 NOTE — PROGRESS NOTES
Pt has discharge orders home with self care for today. Spoke with attending provider regarding the pt discharge. He stated the pt is able to discharge today. Pt is stable, alert, oriented, does not complain of any pain and does not show any signs of distress. Pt is discharging without an IV, tele or alberts.

## 2022-03-16 NOTE — DISCHARGE SUMMARY
Admit date: 3/10/2022   Admitting Provider: Sandra Wesley MD    Discharge date: 3/16/2022  Discharging Provider: Ashley Cohn PA-C      * Admission Diagnoses: GI bleed [K92.2]    * Discharge Diagnoses:    Hospital Problems as of 3/16/2022 Date Reviewed: 9/25/2021          Codes Class Noted - Resolved POA    Metabolic acidosis DJX-67-VH: E87.2  ICD-9-CM: 276.2  3/16/2022 - Present Unknown        Metabolic encephalopathy VZE-03-PM: G93.41  ICD-9-CM: 348.31  3/16/2022 - Present Unknown        Acute urinary tract infection ICD-10-CM: N39.0  ICD-9-CM: 599.0  3/16/2022 - Present Unknown        LOLITA (acute kidney injury) ([facility] [address] [address] ICD-10-CM: N17.9  ICD-9-CM: 584.9  3/16/2022 - Present Unknown        Acute blood loss anemia ICD-10-CM: D62  ICD-9-CM: 285.1  3/16/2022 - Present Unknown        * (Principal) GI bleed ICD-10-CM: K92.2  ICD-9-CM: 578.9  3/11/2022 - Present Unknown              * Hospital Course:   Is a 65-year-old female admitted on 3/10/2022 with a past medical history of COPD, anemia and depression who developed altered mental status and generalized weakness and chills. Reported vomiting and diarrhea for approximately 4 days prior to evaluation in emergency department. Patient was noted to have black tarry stools and an EGD revealed Esophagitis as well as gastritis without occult bleeding. Colonoscopy performed revealed hemorrhoids, second-degree with diverticulosis as well as a sessile polyp which was biopsied revealing a hyperplastic polyp. Patient will follow up with Dr. Tank Haddad in approximately 2 weeks. . Patient with profound LOLITA with metabolic acidosis upon admission with a bicarbonate of 12. Started on bicarbonate drip with complete resolution. LOLITA has resolved. Nephrology consultation and will follow up Dr. Armando smith in approximately 2 weeks. Metabolic encephalopathy has resolved and patient is alert and answering questions appropriately.   Patient also found to have urinary tract infection with gram-negative rods, E. coli sensitive to Levaquin. Discontinued Zosyn after 5 days of treatment. Continue Levaquin for 7 additional days. Blood cultures have been negative. Will discontinue meloxicam due to the chronic renal disease.     Impression\plan:     1.  GI bleed, acute  Continue Protonix  EGD with esophagitis and gastritis without bleeding  Stool occult positive  Colonoscopy with a sessile polyp and biopsy with diverticulosis and hemorrhoids  Regular diet  Hemoglobin trending up and currently 9.3     2. Acute blood loss anemia  Transfused 1 unit packed red blood cell  Resolving  Transfuse for hemoglobin less than 7     3. Altered mental status secondary to metabolic acidosis and encephalopathy  Resolved     4. LOLITA  Appreciate nephrology recommendations  Resolved  Follow-up with Dr. Kelvin smith in approximately 2 weeks     5. Sepsis with urinary tract infection  Gram negative rods, E. coli  7 additional days of Levaquin     6. Hypokalemia  Resolved  Continue potassium 3 times daily as previously prescribed    * Procedures:   Procedure(s):  COLONOSCOPY  COLON BIOPSY      Consults:   Gastrointestinal consult and nephrology    Significant Diagnostic Studies: As discussed in hospital course    Discharge Exam:  Visit Vitals  BP (!) 140/79 (BP 1 Location: Left upper arm, BP Patient Position: At rest;Supine)   Pulse 74   Temp 98.3 °F (36.8 °C)   Resp 20   Ht 5' (1.524 m)   Wt 81.6 kg (180 lb)   LMP  (LMP Unknown) Comment: menopause   SpO2 97%   Breastfeeding No   BMI 35.15 kg/m²     PHYSICAL EXAM:  Constitutional: Alert in no acute distress   HEENT: Sclerae anicteric, The neck is supple. Cardiovascular: Regular rate and rhythm. No murmurs, gallops, or rubs. [place] Respiratory: Clear breath sounds with no wheezes, rales, or rhonchi. GI: Abdomen nondistended, soft, and nontender. Normal active bowel sounds. Rectal: Deferred   Musculoskeletal: No pitting edema of the lower legs.  Extremities have good range of motion. Neurological:  Patient is alert and oriented. Cranial nerves II-XII intact  Psychiatric: Mood appears appropriate with judgement intact. Lymphatic: No cervical or supraclavicular adenopathy. Skin: No rashes or breakdown of the skin      * Discharge Condition: stable  * Disposition: Home    Discharge Medications:  Current Discharge Medication List      START taking these medications    Details   levoFLOXacin (LEVAQUIN) 500 mg tablet Take 1 Tablet by mouth every twenty-four (24) hours for 7 days. Qty: 7 Tablet, Refills: 0  Start date: 3/16/2022, End date: 3/23/2022      traMADoL (Ultram) 50 mg tablet Take 1 Tablet by mouth every six (6) hours as needed for Pain for up to 3 days. Max Daily Amount: 200 mg. Qty: 12 Tablet, Refills: 0  Start date: 3/16/2022, End date: 3/19/2022    Associated Diagnoses: Osteoarthritis of both knees, unspecified osteoarthritis type         CONTINUE these medications which have CHANGED    Details   omeprazole (PriLOSEC OTC) 20 mg tablet Take 2 Tablets by mouth daily. Qty: 30 Tablet, Refills: 0  Start date: 3/16/2022      pregabalin (LYRICA) 225 mg capsule Take 1 Capsule by mouth two (2) times a day. Max Daily Amount: 450 mg.  Qty: 30 Capsule, Refills: 0  Start date: 3/16/2022    Associated Diagnoses: Idiopathic peripheral neuropathy         CONTINUE these medications which have NOT CHANGED    Details   albuterol (PROVENTIL HFA, VENTOLIN HFA, PROAIR HFA) 90 mcg/actuation inhaler Take 1 Puff by inhalation every six (6) hours as needed for Wheezing. potassium chloride (KLOR-CON M10) 10 mEq tablet Take 40 mEq by mouth three (3) times daily. ergocalciferol (Vitamin D2) 1,250 mcg (50,000 unit) capsule Take 50,000 Units by mouth every seven (7) days. atorvastatin (Lipitor) 10 mg tablet Take 10 mg by mouth daily. sodium bicarbonate 650 mg tablet Take 2 Tablets by mouth daily.       calcium carbonate (TUMS) 200 mg calcium (500 mg) chew Take 2 Tablets by mouth every four (4) hours as needed for Other (reflux). Qty: 30 Tablet, Refills: 0      ferrous sulfate 325 mg (65 mg iron) tablet Take 1 Tab by mouth Daily (before breakfast). Qty: 30 Tab, Refills: 0      PARoxetine (PAXIL) 40 mg tablet Take 1 Tab by mouth daily. Qty: 30 Tab, Refills: 0      rOPINIRole (REQUIP) 0.25 mg tablet Take 1 Tab by mouth three (3) times daily. Qty: 90 Tab, Refills: 0         STOP taking these medications       meloxicam (MOBIC) 15 mg tablet Comments:   Reason for Stopping:               * Follow-up Care/Patient Instructions:   Activity: Activity as tolerated  Diet: Regular Diet  Wound Care: None needed    Follow-up Information     Follow up With Specialties Details Why Contact Info    Lennox Sicilian, NP Nurse Practitioner In 1 week  [address]  [phone]      Dr. Ruba Rojas  In 1 week  In [facility], MD Gastroenterology In 1 week  [address] [facility] [address]      Jose Manuel Crum MD Nephrology, Nephrology In 1 week  [address] [place] [phone]            Discharge summary greater than 35 minutes spent with the patient performing discharge instructions, medication review and physical exam    Signed:  Arabella Velarde PA-C  3/16/2022  9:07 AM

## 2022-03-16 NOTE — PROGRESS NOTES
Reviewed discharge instructions with the pt and . Both verbalized understanding of instructions. Pt is discharging with follow up appointments made and prescriptions sent to her preferred pharmacy. Pt was wheeled down in a wheel chair and left in a private vehicle with a family member. Discharge plan of care/case management plan validated with provider discharge order.

## 2022-03-17 NOTE — PROGRESS NOTES
Physician Progress Note      PATIENT:               Morales CARUSO #:                  [id number]  :                       1960  ADMIT DATE:       3/10/2022 11:52 PM  [address] DATE:        3/16/2022 10:20 AM  RESPONDING  PROVIDER #:        Jennifer KENNEY PA-C          QUERY TEXT:    Pt admitted with UTI. Pt noted to have sepsis documented on 3/12. If possible, please document in progress notes and discharge summary the present on admission status of sepsis :    The medical record reflects the following:  Risk Factors: 62 yo female with UTI, LOLITA, metabolic acidosis  Clinical Indicators:  On admission: CRP 14.6,  WBC 19.1,  Procalcitonin 384.22  Treatment: IV Zosyn    Thank you,  Mary Jane Nicholas RN, CCDS  Options provided:  -- Yes, sepsis was present at the time of the order to admit to the hospital  -- No, sepsis was not present on admission and developed during the inpatient stay  -- Other - I will add my own diagnosis  -- Disagree - Not applicable / Not valid  -- Disagree - Clinically unable to determine / Unknown  -- Refer to Clinical Documentation Reviewer    PROVIDER RESPONSE TEXT:    Yes, sepsis was present at the time of the order to admit to the hospital.    Query created by: Selene Daugherty on 3/17/2022 8:03 AM      Electronically signed by:  Yumiko Miranda PA-C 3/17/2022 10:16 AM

## 2022-03-18 PROBLEM — N17.9 ACUTE KIDNEY INJURY (HCC): Status: ACTIVE | Noted: 2021-09-25

## 2022-03-18 LAB
BACTERIA SPEC CULT: NORMAL
SPECIAL REQUESTS,SREQ: NORMAL

## 2022-03-19 PROBLEM — R53.1 GENERALIZED WEAKNESS: Status: ACTIVE | Noted: 2021-06-24

## 2022-03-19 PROBLEM — E87.6 HYPOKALEMIA: Status: ACTIVE | Noted: 2020-11-04

## 2022-03-19 PROBLEM — N39.0 UTI (URINARY TRACT INFECTION): Status: ACTIVE | Noted: 2020-11-04

## 2022-03-19 PROBLEM — K92.2 GI BLEED: Status: ACTIVE | Noted: 2022-03-11

## 2022-03-24 PROBLEM — D62 ACUTE BLOOD LOSS ANEMIA: Status: ACTIVE | Noted: 2022-03-16

## 2022-03-24 PROBLEM — N39.0 ACUTE URINARY TRACT INFECTION: Status: ACTIVE | Noted: 2022-03-16

## 2022-03-24 PROBLEM — N17.9 AKI (ACUTE KIDNEY INJURY) (HCC): Status: ACTIVE | Noted: 2022-03-16

## 2022-03-24 PROBLEM — G93.41 METABOLIC ENCEPHALOPATHY: Status: ACTIVE | Noted: 2022-03-16

## 2022-03-24 PROBLEM — E87.20 METABOLIC ACIDOSIS: Status: ACTIVE | Noted: 2022-03-16

## 2023-02-04 NOTE — PROGRESS NOTES
Bedside and Verbal shift change report given to Ashu Paris RN (oncoming nurse) by Kayli Hollingsworth (offgoing nurse). Report included the following information SBAR, MAR, Recent Results and Cardiac Rhythm SR 1 degree AV block. Patient had phosphorous of 0.9. Dr. Bucky Winslow notified, phosphorous ordered. Bedside and Verbal shift change report given to Kayli Hollingsworth RN (oncoming nurse) by Ashu Paris RN (offgoing nurse). Report included the following information SBAR, MAR, Recent Results and Cardiac Rhythm NSR 1st degree AV block. Total IV Fluids Infused 700 mL.

## 2023-07-20 ENCOUNTER — HOSPITAL ENCOUNTER (EMERGENCY)
Facility: HOSPITAL | Age: 63
Discharge: HOME | End: 2023-07-20
Attending: STUDENT IN AN ORGANIZED HEALTH CARE EDUCATION/TRAINING PROGRAM
Payer: COMMERCIAL

## 2023-07-20 VITALS
RESPIRATION RATE: 10 BRPM | OXYGEN SATURATION: 100 % | DIASTOLIC BLOOD PRESSURE: 71 MMHG | HEIGHT: 60 IN | SYSTOLIC BLOOD PRESSURE: 114 MMHG | BODY MASS INDEX: 40.44 KG/M2 | HEART RATE: 77 BPM | TEMPERATURE: 98 F | WEIGHT: 206 LBS

## 2023-07-20 DIAGNOSIS — N30.00 ACUTE CYSTITIS WITHOUT HEMATURIA: Primary | ICD-10-CM

## 2023-07-20 DIAGNOSIS — E86.0 DEHYDRATION: ICD-10-CM

## 2023-07-20 LAB
ALBUMIN SERPL-MCNC: 4 G/DL (ref 3.5–5)
ALBUMIN/GLOB SERPL: 1.1 (ref 1.1–2.2)
ALP SERPL-CCNC: 94 U/L (ref 45–117)
ALT SERPL-CCNC: 22 U/L (ref 12–78)
ANION GAP SERPL CALC-SCNC: 11 MMOL/L (ref 5–15)
APPEARANCE UR: ABNORMAL
AST SERPL W P-5'-P-CCNC: 8 U/L (ref 15–37)
BACTERIA URNS QL MICRO: ABNORMAL /HPF
BASOPHILS # BLD: 0.1 K/UL (ref 0–0.1)
BASOPHILS NFR BLD: 1 % (ref 0–1)
BILIRUB SERPL-MCNC: 0.4 MG/DL (ref 0.2–1)
BILIRUB UR QL: NEGATIVE
BUN SERPL-MCNC: 18 MG/DL (ref 6–20)
BUN/CREAT SERPL: 13 (ref 12–20)
CA-I BLD-MCNC: 9.8 MG/DL (ref 8.5–10.1)
CHLORIDE SERPL-SCNC: 110 MMOL/L (ref 97–108)
CO2 SERPL-SCNC: 22 MMOL/L (ref 21–32)
COLOR UR: ABNORMAL
CREAT SERPL-MCNC: 1.44 MG/DL (ref 0.55–1.02)
DIFFERENTIAL METHOD BLD: ABNORMAL
EKG ATRIAL RATE: 77 BPM
EKG DIAGNOSIS: NORMAL
EKG P AXIS: 48 DEGREES
EKG P-R INTERVAL: 152 MS
EKG Q-T INTERVAL: 334 MS
EKG QRS DURATION: 76 MS
EKG QTC CALCULATION (BAZETT): 377 MS
EKG R AXIS: 56 DEGREES
EKG T AXIS: 72 DEGREES
EKG VENTRICULAR RATE: 77 BPM
EOSINOPHIL # BLD: 0.2 K/UL (ref 0–0.4)
EOSINOPHIL NFR BLD: 2 % (ref 0–7)
EPITH CASTS URNS QL MICRO: ABNORMAL /LPF
ERYTHROCYTE [DISTWIDTH] IN BLOOD BY AUTOMATED COUNT: 17.4 % (ref 11.5–14.5)
GLOBULIN SER CALC-MCNC: 3.8 G/DL (ref 2–4)
GLUCOSE SERPL-MCNC: 112 MG/DL (ref 65–100)
GLUCOSE UR STRIP.AUTO-MCNC: NEGATIVE MG/DL
HCT VFR BLD AUTO: 37.1 % (ref 35–47)
HGB BLD-MCNC: 11.7 G/DL (ref 11.5–16)
HGB UR QL STRIP: NEGATIVE
HYALINE CASTS URNS QL MICRO: >20 /LPF (ref 0–5)
IMM GRANULOCYTES # BLD AUTO: 0 K/UL (ref 0–0.04)
IMM GRANULOCYTES NFR BLD AUTO: 0 % (ref 0–0.5)
KETONES UR QL STRIP.AUTO: NEGATIVE MG/DL
LEUKOCYTE ESTERASE UR QL STRIP.AUTO: ABNORMAL
LYMPHOCYTES # BLD: 3.5 K/UL (ref 0.8–3.5)
LYMPHOCYTES NFR BLD: 29 % (ref 12–49)
MAGNESIUM SERPL-MCNC: 1.8 MG/DL (ref 1.6–2.4)
MCH RBC QN AUTO: 25.7 PG (ref 26–34)
MCHC RBC AUTO-ENTMCNC: 31.5 G/DL (ref 30–36.5)
MCV RBC AUTO: 81.5 FL (ref 80–99)
MONOCYTES # BLD: 1 K/UL (ref 0–1)
MONOCYTES NFR BLD: 8 % (ref 5–13)
MUCOUS THREADS URNS QL MICRO: ABNORMAL /LPF
NEUTS SEG # BLD: 7.2 K/UL (ref 1.8–8)
NEUTS SEG NFR BLD: 60 % (ref 32–75)
NITRITE UR QL STRIP.AUTO: POSITIVE
NRBC # BLD: 0 K/UL (ref 0–0.01)
NRBC BLD-RTO: 0 PER 100 WBC
PH UR STRIP: 5 (ref 5–8)
PLATELET # BLD AUTO: 418 K/UL (ref 150–400)
PMV BLD AUTO: 11 FL (ref 8.9–12.9)
POTASSIUM SERPL-SCNC: 3.4 MMOL/L (ref 3.5–5.1)
PROT SERPL-MCNC: 7.8 G/DL (ref 6.4–8.2)
PROT UR STRIP-MCNC: 30 MG/DL
RBC # BLD AUTO: 4.55 M/UL (ref 3.8–5.2)
RBC #/AREA URNS HPF: ABNORMAL /HPF (ref 0–5)
SODIUM SERPL-SCNC: 143 MMOL/L (ref 136–145)
SP GR UR REFRACTOMETRY: 1.03 (ref 1–1.03)
TROPONIN I SERPL HS-MCNC: 10 NG/L (ref 0–51)
UROBILINOGEN UR QL STRIP.AUTO: 2 EU/DL (ref 0.1–1)
WBC # BLD AUTO: 12 K/UL (ref 3.6–11)
WBC URNS QL MICRO: ABNORMAL /HPF (ref 0–4)

## 2023-07-20 PROCEDURE — 6370000000 HC RX 637 (ALT 250 FOR IP): Performed by: STUDENT IN AN ORGANIZED HEALTH CARE EDUCATION/TRAINING PROGRAM

## 2023-07-20 PROCEDURE — 99284 EMERGENCY DEPT VISIT MOD MDM: CPT

## 2023-07-20 PROCEDURE — 85025 COMPLETE CBC W/AUTO DIFF WBC: CPT

## 2023-07-20 PROCEDURE — 80053 COMPREHEN METABOLIC PANEL: CPT

## 2023-07-20 PROCEDURE — 93005 ELECTROCARDIOGRAM TRACING: CPT | Performed by: STUDENT IN AN ORGANIZED HEALTH CARE EDUCATION/TRAINING PROGRAM

## 2023-07-20 PROCEDURE — 81001 URINALYSIS AUTO W/SCOPE: CPT

## 2023-07-20 PROCEDURE — 84484 ASSAY OF TROPONIN QUANT: CPT

## 2023-07-20 PROCEDURE — 2580000003 HC RX 258: Performed by: STUDENT IN AN ORGANIZED HEALTH CARE EDUCATION/TRAINING PROGRAM

## 2023-07-20 PROCEDURE — 36415 COLL VENOUS BLD VENIPUNCTURE: CPT

## 2023-07-20 PROCEDURE — 96360 HYDRATION IV INFUSION INIT: CPT

## 2023-07-20 PROCEDURE — 83735 ASSAY OF MAGNESIUM: CPT

## 2023-07-20 RX ORDER — 0.9 % SODIUM CHLORIDE 0.9 %
1000 INTRAVENOUS SOLUTION INTRAVENOUS ONCE
Status: COMPLETED | OUTPATIENT
Start: 2023-07-20 | End: 2023-07-20

## 2023-07-20 RX ORDER — POTASSIUM CHLORIDE 750 MG/1
40 TABLET, FILM COATED, EXTENDED RELEASE ORAL ONCE
Status: COMPLETED | OUTPATIENT
Start: 2023-07-20 | End: 2023-07-20

## 2023-07-20 RX ORDER — NITROFURANTOIN 25; 75 MG/1; MG/1
100 CAPSULE ORAL 2 TIMES DAILY
Qty: 10 CAPSULE | Refills: 0 | Status: SHIPPED | OUTPATIENT
Start: 2023-07-20 | End: 2023-07-25

## 2023-07-20 RX ADMIN — SODIUM CHLORIDE 1000 ML: 9 INJECTION, SOLUTION INTRAVENOUS at 03:28

## 2023-07-20 RX ADMIN — POTASSIUM CHLORIDE 40 MEQ: 750 TABLET, EXTENDED RELEASE ORAL at 03:24

## 2023-07-20 NOTE — DISCHARGE INSTRUCTIONS
Thank you! Thank you for allowing me to care for you in the emergency department. It is my goal to provide you with excellent care. If you have not received excellent quality care, please ask to speak to the nurse manager. Please fill out the survey that will come to you by mail or email since we listen to your feedback! Below you will find a list of your tests from today's visit. Should you have any questions, please do not hesitate to call the emergency department.     Labs  Recent Results (from the past 12 hour(s))   CBC with Auto Differential    Collection Time: 07/20/23 12:48 AM   Result Value Ref Range    WBC 12.0 (H) 3.6 - 11.0 K/uL    RBC 4.55 3.80 - 5.20 M/uL    Hemoglobin 11.7 11.5 - 16.0 g/dL    Hematocrit 37.1 35.0 - 47.0 %    MCV 81.5 80.0 - 99.0 FL    MCH 25.7 (L) 26.0 - 34.0 PG    MCHC 31.5 30.0 - 36.5 g/dL    RDW 17.4 (H) 11.5 - 14.5 %    Platelets 854 (H) 308 - 400 K/uL    MPV 11.0 8.9 - 12.9 FL    Nucleated RBCs 0.0 0.0  WBC    nRBC 0.00 0.00 - 0.01 K/uL    Neutrophils % 60 32 - 75 %    Lymphocytes % 29 12 - 49 %    Monocytes % 8 5 - 13 %    Eosinophils % 2 0 - 7 %    Basophils % 1 0 - 1 %    Immature Granulocytes 0 0 - 0.5 %    Neutrophils Absolute 7.2 1.8 - 8.0 K/UL    Lymphocytes Absolute 3.5 0.8 - 3.5 K/UL    Monocytes Absolute 1.0 0.0 - 1.0 K/UL    Eosinophils Absolute 0.2 0.0 - 0.4 K/UL    Basophils Absolute 0.1 0.0 - 0.1 K/UL    Absolute Immature Granulocyte 0.0 0.00 - 0.04 K/UL    Differential Type AUTOMATED     Comprehensive Metabolic Panel    Collection Time: 07/20/23 12:48 AM   Result Value Ref Range    Sodium 143 136 - 145 mmol/L    Potassium 3.4 (L) 3.5 - 5.1 mmol/L    Chloride 110 (H) 97 - 108 mmol/L    CO2 22 21 - 32 mmol/L    Anion Gap 11 5 - 15 mmol/L    Glucose 112 (H) 65 - 100 mg/dL    BUN 18 6 - 20 mg/dL    Creatinine 1.44 (H) 0.55 - 1.02 mg/dL    Bun/Cre Ratio 13 12 - 20      Est, Glom Filt Rate 42 (L) >60 ml/min/1.73m2    Calcium 9.8 8.5 - 10.1 mg/dL    Total
yes

## 2025-02-04 ENCOUNTER — TELEPHONE (OUTPATIENT)
Facility: CLINIC | Age: 65
End: 2025-02-04

## 2025-02-04 NOTE — TELEPHONE ENCOUNTER
----- Message from Madi EATON sent at 2/3/2025  3:33 PM EST -----  Regarding: [facility] Appointment Request  [facility] Appointment Request    Patient needs appointment for [facility] Appointment Type: New Patient.    Patient Requested Dates(s):as soon as possible  Patient Requested Time:morning before noon  Provider Name:someone in the office    Reason for Appointment Request: New Patient - Requested Provider unavailable. Establish care for new patient she preferred someone in the office.  --------------------------------------------------------------------------------------------------------------------------    Relationship to Patient: Self     Call Back Information: OK to leave message on voicemail  Preferred Call Back Number: Phone [phone]

## 2025-05-15 ENCOUNTER — OFFICE VISIT (OUTPATIENT)
Facility: CLINIC | Age: 65
End: 2025-05-15
Payer: COMMERCIAL

## 2025-05-15 VITALS
HEART RATE: 83 BPM | SYSTOLIC BLOOD PRESSURE: 126 MMHG | TEMPERATURE: 98.2 F | HEIGHT: 60 IN | WEIGHT: 206 LBS | OXYGEN SATURATION: 97 % | DIASTOLIC BLOOD PRESSURE: 81 MMHG | BODY MASS INDEX: 40.44 KG/M2

## 2025-05-15 DIAGNOSIS — N18.9 CHRONIC KIDNEY DISEASE, UNSPECIFIED CKD STAGE: ICD-10-CM

## 2025-05-15 DIAGNOSIS — G25.81 RESTLESS LEG SYNDROME: ICD-10-CM

## 2025-05-15 DIAGNOSIS — M79.7 FIBROMYALGIA: ICD-10-CM

## 2025-05-15 DIAGNOSIS — R06.00 DYSPNEA, UNSPECIFIED TYPE: ICD-10-CM

## 2025-05-15 DIAGNOSIS — J30.9 ALLERGIC RHINITIS, UNSPECIFIED SEASONALITY, UNSPECIFIED TRIGGER: ICD-10-CM

## 2025-05-15 DIAGNOSIS — E78.5 HYPERLIPIDEMIA, UNSPECIFIED HYPERLIPIDEMIA TYPE: ICD-10-CM

## 2025-05-15 DIAGNOSIS — D64.9 ANEMIA, UNSPECIFIED TYPE: ICD-10-CM

## 2025-05-15 DIAGNOSIS — E78.5 HYPERLIPIDEMIA, UNSPECIFIED HYPERLIPIDEMIA TYPE: Primary | ICD-10-CM

## 2025-05-15 DIAGNOSIS — R01.1 HEART MURMUR: ICD-10-CM

## 2025-05-15 PROCEDURE — 99204 OFFICE O/P NEW MOD 45 MIN: CPT | Performed by: STUDENT IN AN ORGANIZED HEALTH CARE EDUCATION/TRAINING PROGRAM

## 2025-05-15 RX ORDER — POTASSIUM CHLORIDE 750 MG/1
10 CAPSULE, EXTENDED RELEASE ORAL DAILY
COMMUNITY
End: 2025-05-26

## 2025-05-15 RX ORDER — PREGABALIN 225 MG/1
225 CAPSULE ORAL 2 TIMES DAILY
Qty: 60 CAPSULE | Refills: 2 | Status: SHIPPED | OUTPATIENT
Start: 2025-05-15 | End: 2025-08-13

## 2025-05-15 RX ORDER — PAROXETINE 40 MG/1
40 TABLET, FILM COATED ORAL DAILY
Qty: 90 TABLET | Refills: 2 | Status: SHIPPED | OUTPATIENT
Start: 2025-05-15

## 2025-05-15 RX ORDER — ROPINIROLE 0.25 MG/1
0.25 TABLET, FILM COATED ORAL DAILY
COMMUNITY
End: 2025-05-15 | Stop reason: SDUPTHER

## 2025-05-15 RX ORDER — ROPINIROLE 0.25 MG/1
0.25 TABLET, FILM COATED ORAL DAILY
Qty: 90 TABLET | Refills: 2 | Status: SHIPPED | OUTPATIENT
Start: 2025-05-15

## 2025-05-15 RX ORDER — FLUTICASONE PROPIONATE 50 MCG
1 SPRAY, SUSPENSION (ML) NASAL DAILY
Qty: 32 G | Refills: 1 | Status: SHIPPED | OUTPATIENT
Start: 2025-05-15

## 2025-05-15 SDOH — ECONOMIC STABILITY: FOOD INSECURITY: WITHIN THE PAST 12 MONTHS, YOU WORRIED THAT YOUR FOOD WOULD RUN OUT BEFORE YOU GOT MONEY TO BUY MORE.: NEVER TRUE

## 2025-05-15 SDOH — ECONOMIC STABILITY: FOOD INSECURITY: WITHIN THE PAST 12 MONTHS, THE FOOD YOU BOUGHT JUST DIDN'T LAST AND YOU DIDN'T HAVE MONEY TO GET MORE.: NEVER TRUE

## 2025-05-15 ASSESSMENT — PATIENT HEALTH QUESTIONNAIRE - PHQ9
SUM OF ALL RESPONSES TO PHQ QUESTIONS 1-9: 0
2. FEELING DOWN, DEPRESSED OR HOPELESS: NOT AT ALL
1. LITTLE INTEREST OR PLEASURE IN DOING THINGS: NOT AT ALL

## 2025-05-15 NOTE — PROGRESS NOTES
Chief Complaint   Patient presents with    New Patient     Establishing care. She needs refills, her knees are swelling and she said she is congested    Medication Refill    Joint Swelling       /81   Pulse 83   Temp 98.2 °F (36.8 °C)   Ht 1.524 m (5')   Wt 93.4 kg (206 lb)   SpO2 97%   BMI 40.23 kg/m²     Have you been to the ER, urgent care clinic since your last visit?  Hospitalized since your last visit?   NO    Have you seen or consulted any other health care providers outside our system since your last visit?   NO    Have you had a mammogram?”   NO    No breast cancer screening on file      “Have you had a pap smear?”    NO    No cervical cancer screening on file

## 2025-05-15 NOTE — PROGRESS NOTES
Nerissa Oliveira (: 1960) is a 64 y.o. female, New patient patient, here for evaluation of the following chief complaint(s):  New Patient (Establishing care. She needs refills, her knees are swelling and she said she is congested), Medication Refill, and Joint Swelling       ASSESSMENT/PLAN:  Below is the assessment and plan developed based on review of pertinent history, physical exam, labs, studies, and medications.    1. Hyperlipidemia, unspecified hyperlipidemia type  -     Lipid Panel; Future  2. Restless leg syndrome  -     rOPINIRole (REQUIP) 0.25 MG tablet; Take 1 tablet by mouth daily, Disp-90 tablet, R-2Normal  3. Chronic kidney disease, unspecified CKD stage  -     CBC; Future  -     Comprehensive Metabolic Panel; Future  -     Amb External Referral To Nephrology  4. Anemia, unspecified type  -     CBC; Future  -     TSH; Future  -     T4, Free; Future  -     Iron and TIBC; Future  -     Ferritin; Future  -     Folate; Future  -     Vitamin B12; Future  5. Fibromyalgia  -     pregabalin (LYRICA) 225 MG capsule; Take 1 capsule by mouth 2 times daily for 90 days. Max Daily Amount: 450 mg, Disp-60 capsule, R-2Normal  6. Heart murmur  -     Echo (TTE) complete (PRN contrast/bubble/strain/3D); Future  -     AFL - Nathan Leo MD, Cardiology, [place]  7. Dyspnea, unspecified type  -     LUISA - Nathan Leo MD, Cardiology, [place]  8. Allergic rhinitis, unspecified seasonality, unspecified trigger  -     fluticasone (FLONASE) 50 MCG/ACT nasal spray; 1 spray by Each Nostril route daily, Disp-32 g, R-1Normal    New patient  Has seen oncologist few months for anemia., was told she needs b12 supplements   Continues to smoke but has cut down to 6 cigs/day.   GERD: controlled, on omeprazole.     She nasal congestion since 3 months, no fever, cough has cleared.  Prescribed Flonase.  Does complain of dyspnea, given referral to cardiology.  She also has a heart murmur on exam.  She has

## 2025-05-21 LAB
CHOLEST SERPL-MCNC: 229 MG/DL (ref 100–199)
ERYTHROCYTE [DISTWIDTH] IN BLOOD BY AUTOMATED COUNT: 17.8 % (ref 11.7–15.4)
FERRITIN SERPL-MCNC: 10 NG/ML (ref 15–150)
FOLATE SERPL-MCNC: 4.1 NG/ML
HCT VFR BLD AUTO: 28.5 % (ref 34–46.6)
HDLC SERPL-MCNC: 53 MG/DL
HGB BLD-MCNC: 8.3 G/DL (ref 11.1–15.9)
IRON SATN MFR SERPL: 4 % (ref 15–55)
IRON SERPL-MCNC: 17 UG/DL (ref 27–139)
LDLC SERPL CALC-MCNC: 155 MG/DL (ref 0–99)
MCH RBC QN AUTO: 23.2 PG (ref 26.6–33)
MCHC RBC AUTO-ENTMCNC: 29.1 G/DL (ref 31.5–35.7)
MCV RBC AUTO: 80 FL (ref 79–97)
PLATELET # BLD AUTO: 403 X10E3/UL (ref 150–450)
RBC # BLD AUTO: 3.57 X10E6/UL (ref 3.77–5.28)
T4 FREE SERPL-MCNC: 1.12 NG/DL (ref 0.82–1.77)
TIBC SERPL-MCNC: 388 UG/DL (ref 250–450)
TRIGL SERPL-MCNC: 115 MG/DL (ref 0–149)
TSH SERPL DL<=0.005 MIU/L-ACNC: 2.42 UIU/ML (ref 0.45–4.5)
UIBC SERPL-MCNC: 371 UG/DL (ref 118–369)
VIT B12 SERPL-MCNC: 235 PG/ML (ref 232–1245)
VLDLC SERPL CALC-MCNC: 21 MG/DL (ref 5–40)
WBC # BLD AUTO: 11.7 X10E3/UL (ref 3.4–10.8)

## 2025-05-22 ENCOUNTER — RESULTS FOLLOW-UP (OUTPATIENT)
Facility: CLINIC | Age: 65
End: 2025-05-22

## 2025-05-22 DIAGNOSIS — N18.31 CKD STAGE 3A, GFR 45-59 ML/MIN (HCC): Primary | ICD-10-CM

## 2025-05-22 DIAGNOSIS — Z87.19 HISTORY OF GI BLEED: ICD-10-CM

## 2025-05-22 DIAGNOSIS — E78.5 HYPERLIPIDEMIA, UNSPECIFIED HYPERLIPIDEMIA TYPE: ICD-10-CM

## 2025-05-22 DIAGNOSIS — D64.9 ANEMIA, UNSPECIFIED TYPE: ICD-10-CM

## 2025-05-22 LAB
ALBUMIN SERPL-MCNC: 4.1 G/DL (ref 3.9–4.9)
ALP SERPL-CCNC: 69 IU/L (ref 44–121)
ALT SERPL-CCNC: 13 IU/L (ref 0–32)
AST SERPL-CCNC: 15 IU/L (ref 0–40)
BILIRUB SERPL-MCNC: <0.2 MG/DL (ref 0–1.2)
BUN SERPL-MCNC: 18 MG/DL (ref 8–27)
BUN/CREAT SERPL: 14 (ref 12–28)
CALCIUM SERPL-MCNC: 9 MG/DL (ref 8.7–10.3)
CHLORIDE SERPL-SCNC: 105 MMOL/L (ref 96–106)
CO2 SERPL-SCNC: 17 MMOL/L (ref 20–29)
CREAT SERPL-MCNC: 1.32 MG/DL (ref 0.57–1)
EGFRCR SERPLBLD CKD-EPI 2021: 46 ML/MIN/1.73
GLOBULIN SER CALC-MCNC: 2.6 G/DL (ref 1.5–4.5)
GLUCOSE SERPL-MCNC: 92 MG/DL (ref 70–99)
POTASSIUM SERPL-SCNC: 4.7 MMOL/L (ref 3.5–5.2)
PROT SERPL-MCNC: 6.7 G/DL (ref 6–8.5)
SODIUM SERPL-SCNC: 140 MMOL/L (ref 134–144)

## 2025-05-26 RX ORDER — SODIUM BICARBONATE 650 MG/1
1300 TABLET ORAL DAILY
Qty: 180 TABLET | Refills: 1 | Status: SHIPPED | OUTPATIENT
Start: 2025-05-26

## 2025-05-26 RX ORDER — ATORVASTATIN CALCIUM 20 MG/1
20 TABLET, FILM COATED ORAL DAILY
Qty: 90 TABLET | Refills: 2 | Status: SHIPPED | OUTPATIENT
Start: 2025-05-26

## 2025-05-26 RX ORDER — FERROUS SULFATE 325(65) MG
325 TABLET ORAL 2 TIMES DAILY WITH MEALS
Qty: 180 TABLET | Refills: 2 | Status: SHIPPED | OUTPATIENT
Start: 2025-05-26

## 2025-06-03 DIAGNOSIS — D64.9 ANEMIA, UNSPECIFIED TYPE: Primary | ICD-10-CM

## 2025-06-03 NOTE — RESULT ENCOUNTER NOTE
Called pt regarding hematology referral. N/A. Left message that hematology referral was made and they should get in touch with her soon, if not please call office for contact number for that office. Thank you

## 2025-06-25 ENCOUNTER — TRANSCRIBE ORDERS (OUTPATIENT)
Facility: HOSPITAL | Age: 65
End: 2025-06-25

## 2025-06-25 DIAGNOSIS — N18.30 STAGE 3 CHRONIC KIDNEY DISEASE, UNSPECIFIED WHETHER STAGE 3A OR 3B CKD (HCC): Primary | ICD-10-CM

## 2025-07-31 DIAGNOSIS — M79.7 FIBROMYALGIA: ICD-10-CM

## 2025-08-01 RX ORDER — PREGABALIN 225 MG/1
225 CAPSULE ORAL 2 TIMES DAILY
Qty: 60 CAPSULE | Refills: 2 | Status: SHIPPED | OUTPATIENT
Start: 2025-08-01 | End: 2025-10-30

## 2025-08-19 ENCOUNTER — OFFICE VISIT (OUTPATIENT)
Facility: CLINIC | Age: 65
End: 2025-08-19
Payer: COMMERCIAL

## 2025-08-19 VITALS
SYSTOLIC BLOOD PRESSURE: 156 MMHG | TEMPERATURE: 97.5 F | BODY MASS INDEX: 36.63 KG/M2 | HEIGHT: 60 IN | DIASTOLIC BLOOD PRESSURE: 80 MMHG | WEIGHT: 186.6 LBS | OXYGEN SATURATION: 98 % | RESPIRATION RATE: 18 BRPM | HEART RATE: 66 BPM

## 2025-08-19 DIAGNOSIS — G25.81 RESTLESS LEG SYNDROME: ICD-10-CM

## 2025-08-19 DIAGNOSIS — Z12.4 SCREENING FOR CERVICAL CANCER: ICD-10-CM

## 2025-08-19 DIAGNOSIS — I10 PRIMARY HYPERTENSION: ICD-10-CM

## 2025-08-19 DIAGNOSIS — Z87.19 HISTORY OF GI BLEED: ICD-10-CM

## 2025-08-19 DIAGNOSIS — M17.0 OSTEOARTHRITIS OF BOTH KNEES, UNSPECIFIED OSTEOARTHRITIS TYPE: ICD-10-CM

## 2025-08-19 DIAGNOSIS — Z12.31 ENCOUNTER FOR SCREENING MAMMOGRAM FOR MALIGNANT NEOPLASM OF BREAST: ICD-10-CM

## 2025-08-19 DIAGNOSIS — D64.9 ANEMIA, UNSPECIFIED TYPE: Primary | ICD-10-CM

## 2025-08-19 DIAGNOSIS — D64.9 ANEMIA, UNSPECIFIED TYPE: ICD-10-CM

## 2025-08-19 DIAGNOSIS — K21.9 GASTROESOPHAGEAL REFLUX DISEASE, UNSPECIFIED WHETHER ESOPHAGITIS PRESENT: ICD-10-CM

## 2025-08-19 PROCEDURE — 3079F DIAST BP 80-89 MM HG: CPT | Performed by: STUDENT IN AN ORGANIZED HEALTH CARE EDUCATION/TRAINING PROGRAM

## 2025-08-19 PROCEDURE — 3077F SYST BP >= 140 MM HG: CPT | Performed by: STUDENT IN AN ORGANIZED HEALTH CARE EDUCATION/TRAINING PROGRAM

## 2025-08-19 PROCEDURE — 99214 OFFICE O/P EST MOD 30 MIN: CPT | Performed by: STUDENT IN AN ORGANIZED HEALTH CARE EDUCATION/TRAINING PROGRAM

## 2025-08-19 RX ORDER — TRAMADOL HYDROCHLORIDE 50 MG/1
TABLET ORAL
COMMUNITY
End: 2025-08-19

## 2025-08-19 RX ORDER — LEVALBUTEROL TARTRATE 45 UG/1
2 AEROSOL, METERED ORAL EVERY 4 HOURS PRN
COMMUNITY

## 2025-08-19 RX ORDER — PANTOPRAZOLE SODIUM 40 MG/1
40 TABLET, DELAYED RELEASE ORAL DAILY
Qty: 90 TABLET | Refills: 2 | Status: SHIPPED | OUTPATIENT
Start: 2025-08-19

## 2025-08-19 RX ORDER — PANTOPRAZOLE SODIUM 40 MG/1
40 TABLET, DELAYED RELEASE ORAL DAILY
COMMUNITY
End: 2025-08-19 | Stop reason: SDUPTHER

## 2025-08-19 ASSESSMENT — ENCOUNTER SYMPTOMS
COUGH: 0
ABDOMINAL PAIN: 0
SHORTNESS OF BREATH: 0

## 2025-09-03 LAB
ALBUMIN SERPL-MCNC: 4.2 G/DL (ref 3.9–4.9)
ALP SERPL-CCNC: 76 IU/L (ref 44–121)
ALT SERPL-CCNC: 13 IU/L (ref 0–32)
AST SERPL-CCNC: 13 IU/L (ref 0–40)
BILIRUB SERPL-MCNC: <0.2 MG/DL (ref 0–1.2)
BUN SERPL-MCNC: 23 MG/DL (ref 8–27)
BUN/CREAT SERPL: 21 (ref 12–28)
CALCIUM SERPL-MCNC: 9.3 MG/DL (ref 8.7–10.3)
CHLORIDE SERPL-SCNC: 103 MMOL/L (ref 96–106)
CHOLEST SERPL-MCNC: 289 MG/DL (ref 100–199)
CO2 SERPL-SCNC: 23 MMOL/L (ref 20–29)
CREAT SERPL-MCNC: 1.11 MG/DL (ref 0.57–1)
EGFRCR SERPLBLD CKD-EPI 2021: 56 ML/MIN/1.73
ERYTHROCYTE [DISTWIDTH] IN BLOOD BY AUTOMATED COUNT: 22.2 % (ref 11.7–15.4)
GLOBULIN SER CALC-MCNC: 2.7 G/DL (ref 1.5–4.5)
GLUCOSE SERPL-MCNC: 95 MG/DL (ref 70–99)
HCT VFR BLD AUTO: 38.9 % (ref 34–46.6)
HDLC SERPL-MCNC: 58 MG/DL
HGB BLD-MCNC: 11.4 G/DL (ref 11.1–15.9)
LDLC SERPL CALC-MCNC: 207 MG/DL (ref 0–99)
Lab: ABNORMAL
MCH RBC QN AUTO: 24.8 PG (ref 26.6–33)
MCHC RBC AUTO-ENTMCNC: 29.3 G/DL (ref 31.5–35.7)
MCV RBC AUTO: 85 FL (ref 79–97)
PLATELET # BLD AUTO: 369 X10E3/UL (ref 150–450)
POTASSIUM SERPL-SCNC: 5 MMOL/L (ref 3.5–5.2)
PROT SERPL-MCNC: 6.9 G/DL (ref 6–8.5)
RBC # BLD AUTO: 4.6 X10E6/UL (ref 3.77–5.28)
SODIUM SERPL-SCNC: 141 MMOL/L (ref 134–144)
TRIGL SERPL-MCNC: 131 MG/DL (ref 0–149)
VLDLC SERPL CALC-MCNC: 24 MG/DL (ref 5–40)
WBC # BLD AUTO: 8.3 X10E3/UL (ref 3.4–10.8)

## (undated) DEVICE — ENDO KIT 1: Brand: MEDLINE INDUSTRIES, INC.

## (undated) DEVICE — CANNULA NSL O2 AD 7 FT END-TIDAL CARBON DIOX VENTFLO

## (undated) DEVICE — THE ENDO CARRY-ON PROCEDURE KIT CONTAINS ALL OF THE SUPPLIES AND INFECTION PREVENTION PRODUCTS NEEDED FOR ENDOSCOPIC PROCEDURES: Brand: ENDO CARRY-ON PROCEDURE KIT

## (undated) DEVICE — FCPS RAD JAW 4LC 240CM W/NDL -- BX/20 RADIAL JAW 4

## (undated) DEVICE — MASK ANES INF SZ 2 PREM TAIL VLV INFL PRT UNSCENTED SGL PT

## (undated) DEVICE — MASK 14X6.5MM O2 PVC ADLT ADPT -- 2 ENTRY PORT ELAS STRP NSE CL

## (undated) DEVICE — MOUTHPIECE ENDOSCP 20X27MM --